# Patient Record
Sex: MALE | Race: WHITE | Employment: FULL TIME | URBAN - METROPOLITAN AREA
[De-identification: names, ages, dates, MRNs, and addresses within clinical notes are randomized per-mention and may not be internally consistent; named-entity substitution may affect disease eponyms.]

---

## 2017-10-16 ENCOUNTER — HOSPITAL ENCOUNTER (OUTPATIENT)
Dept: CARDIOLOGY | Age: 52
Discharge: HOME | End: 2017-10-16
Attending: PHYSICIAN ASSISTANT | Admitting: PHYSICIAN ASSISTANT

## 2017-10-16 DIAGNOSIS — R94.31 ABNORMAL ELECTROCARDIOGRAM: ICD-10-CM

## 2017-10-16 DIAGNOSIS — R06.02 SHORTNESS OF BREATH: ICD-10-CM

## 2017-10-16 DIAGNOSIS — R07.9 CHEST PAIN, UNSPECIFIED TYPE: ICD-10-CM

## 2017-10-16 LAB
LV EF: 67 %
LVEF MODALITY: NORMAL

## 2017-10-16 RX ADMIN — Medication 30 MILLICURIE: at 11:02

## 2017-10-16 RX ADMIN — Medication 10 MILLICURIE: at 11:02

## 2017-10-23 ENCOUNTER — TELEPHONE (OUTPATIENT)
Dept: CARDIOLOGY CLINIC | Age: 52
End: 2017-10-23

## 2017-11-13 ENCOUNTER — TELEPHONE (OUTPATIENT)
Dept: CARDIOLOGY CLINIC | Age: 52
End: 2017-11-13

## 2019-08-31 DIAGNOSIS — F41.9 ANXIETY: ICD-10-CM

## 2019-09-12 ENCOUNTER — OFFICE VISIT (OUTPATIENT)
Dept: FAMILY MEDICINE CLINIC | Age: 54
End: 2019-09-12
Payer: COMMERCIAL

## 2019-09-12 VITALS
HEIGHT: 70 IN | OXYGEN SATURATION: 99 % | BODY MASS INDEX: 30.29 KG/M2 | SYSTOLIC BLOOD PRESSURE: 126 MMHG | WEIGHT: 211.6 LBS | HEART RATE: 88 BPM | DIASTOLIC BLOOD PRESSURE: 68 MMHG

## 2019-09-12 DIAGNOSIS — Z23 NEED FOR PROPHYLACTIC VACCINATION AND INOCULATION AGAINST VARICELLA: ICD-10-CM

## 2019-09-12 DIAGNOSIS — I44.1 WENCKEBACH SECOND DEGREE AV BLOCK: ICD-10-CM

## 2019-09-12 DIAGNOSIS — Z13.220 SCREENING FOR HYPERLIPIDEMIA: ICD-10-CM

## 2019-09-12 DIAGNOSIS — Z00.00 WELL ADULT EXAM: ICD-10-CM

## 2019-09-12 DIAGNOSIS — Z23 NEED FOR PROPHYLACTIC VACCINATION AGAINST DIPHTHERIA-TETANUS-PERTUSSIS (DTP): ICD-10-CM

## 2019-09-12 DIAGNOSIS — Z12.11 ENCOUNTER FOR SCREENING COLONOSCOPY: ICD-10-CM

## 2019-09-12 DIAGNOSIS — Z00.00 WELL ADULT EXAM: Primary | ICD-10-CM

## 2019-09-12 LAB
A/G RATIO: 1.7 (ref 1.1–2.2)
ALBUMIN SERPL-MCNC: 4.5 G/DL (ref 3.4–5)
ALP BLD-CCNC: 132 U/L (ref 40–129)
ALT SERPL-CCNC: 31 U/L (ref 10–40)
ANION GAP SERPL CALCULATED.3IONS-SCNC: 16 MMOL/L (ref 3–16)
AST SERPL-CCNC: 26 U/L (ref 15–37)
BASOPHILS ABSOLUTE: 0.1 K/UL (ref 0–0.2)
BASOPHILS RELATIVE PERCENT: 0.8 %
BILIRUB SERPL-MCNC: 0.5 MG/DL (ref 0–1)
BUN BLDV-MCNC: 18 MG/DL (ref 7–20)
CALCIUM SERPL-MCNC: 9.7 MG/DL (ref 8.3–10.6)
CHLORIDE BLD-SCNC: 100 MMOL/L (ref 99–110)
CHOLESTEROL, FASTING: 206 MG/DL (ref 0–199)
CO2: 22 MMOL/L (ref 21–32)
CREAT SERPL-MCNC: 0.9 MG/DL (ref 0.9–1.3)
EOSINOPHILS ABSOLUTE: 0.2 K/UL (ref 0–0.6)
EOSINOPHILS RELATIVE PERCENT: 2.2 %
GFR AFRICAN AMERICAN: >60
GFR NON-AFRICAN AMERICAN: >60
GLOBULIN: 2.7 G/DL
GLUCOSE BLD-MCNC: 89 MG/DL (ref 70–99)
HCT VFR BLD CALC: 45.1 % (ref 40.5–52.5)
HDLC SERPL-MCNC: 66 MG/DL (ref 40–60)
HEMOGLOBIN: 15.1 G/DL (ref 13.5–17.5)
LDL CHOLESTEROL CALCULATED: 82 MG/DL
LYMPHOCYTES ABSOLUTE: 2 K/UL (ref 1–5.1)
LYMPHOCYTES RELATIVE PERCENT: 27.1 %
MCH RBC QN AUTO: 30.4 PG (ref 26–34)
MCHC RBC AUTO-ENTMCNC: 33.4 G/DL (ref 31–36)
MCV RBC AUTO: 91 FL (ref 80–100)
MONOCYTES ABSOLUTE: 0.8 K/UL (ref 0–1.3)
MONOCYTES RELATIVE PERCENT: 10.4 %
NEUTROPHILS ABSOLUTE: 4.3 K/UL (ref 1.7–7.7)
NEUTROPHILS RELATIVE PERCENT: 59.5 %
PDW BLD-RTO: 14.6 % (ref 12.4–15.4)
PLATELET # BLD: 303 K/UL (ref 135–450)
PMV BLD AUTO: 8 FL (ref 5–10.5)
POTASSIUM SERPL-SCNC: 4.4 MMOL/L (ref 3.5–5.1)
RBC # BLD: 4.95 M/UL (ref 4.2–5.9)
SODIUM BLD-SCNC: 138 MMOL/L (ref 136–145)
TOTAL PROTEIN: 7.2 G/DL (ref 6.4–8.2)
TRIGLYCERIDE, FASTING: 291 MG/DL (ref 0–150)
TSH SERPL DL<=0.05 MIU/L-ACNC: 1.8 UIU/ML (ref 0.27–4.2)
VLDLC SERPL CALC-MCNC: 58 MG/DL
WBC # BLD: 7.3 K/UL (ref 4–11)

## 2019-09-12 PROCEDURE — 90715 TDAP VACCINE 7 YRS/> IM: CPT | Performed by: PHYSICIAN ASSISTANT

## 2019-09-12 PROCEDURE — 90471 IMMUNIZATION ADMIN: CPT | Performed by: PHYSICIAN ASSISTANT

## 2019-09-12 PROCEDURE — 99396 PREV VISIT EST AGE 40-64: CPT | Performed by: PHYSICIAN ASSISTANT

## 2019-09-12 ASSESSMENT — PATIENT HEALTH QUESTIONNAIRE - PHQ9
1. LITTLE INTEREST OR PLEASURE IN DOING THINGS: 0
2. FEELING DOWN, DEPRESSED OR HOPELESS: 0
SUM OF ALL RESPONSES TO PHQ QUESTIONS 1-9: 0
SUM OF ALL RESPONSES TO PHQ QUESTIONS 1-9: 0
SUM OF ALL RESPONSES TO PHQ9 QUESTIONS 1 & 2: 0

## 2019-09-12 ASSESSMENT — ENCOUNTER SYMPTOMS
COUGH: 0
SHORTNESS OF BREATH: 1
CHEST TIGHTNESS: 0

## 2019-09-13 ENCOUNTER — TELEPHONE (OUTPATIENT)
Dept: FAMILY MEDICINE CLINIC | Age: 54
End: 2019-09-13

## 2019-09-16 ENCOUNTER — TELEPHONE (OUTPATIENT)
Dept: FAMILY MEDICINE CLINIC | Age: 54
End: 2019-09-16

## 2019-09-17 ENCOUNTER — TELEPHONE (OUTPATIENT)
Dept: CARDIOLOGY CLINIC | Age: 54
End: 2019-09-17

## 2019-09-19 ENCOUNTER — TELEPHONE (OUTPATIENT)
Dept: CARDIOLOGY CLINIC | Age: 54
End: 2019-09-19

## 2019-09-23 ENCOUNTER — TELEPHONE (OUTPATIENT)
Dept: CARDIOLOGY CLINIC | Age: 54
End: 2019-09-23

## 2019-09-25 ENCOUNTER — TELEPHONE (OUTPATIENT)
Dept: CARDIOLOGY CLINIC | Age: 54
End: 2019-09-25

## 2019-10-01 ENCOUNTER — TELEPHONE (OUTPATIENT)
Dept: CARDIOLOGY CLINIC | Age: 54
End: 2019-10-01

## 2019-10-08 ENCOUNTER — TELEPHONE (OUTPATIENT)
Dept: CARDIOLOGY CLINIC | Age: 54
End: 2019-10-08

## 2020-01-07 ENCOUNTER — OFFICE VISIT (OUTPATIENT)
Dept: FAMILY MEDICINE CLINIC | Age: 55
End: 2020-01-07
Payer: COMMERCIAL

## 2020-01-07 VITALS
BODY MASS INDEX: 31.07 KG/M2 | WEIGHT: 217 LBS | SYSTOLIC BLOOD PRESSURE: 136 MMHG | HEART RATE: 50 BPM | DIASTOLIC BLOOD PRESSURE: 64 MMHG | OXYGEN SATURATION: 96 % | TEMPERATURE: 97.7 F | HEIGHT: 70 IN

## 2020-01-07 PROCEDURE — 99213 OFFICE O/P EST LOW 20 MIN: CPT | Performed by: NURSE PRACTITIONER

## 2020-01-07 RX ORDER — METHYLPREDNISOLONE 4 MG/1
TABLET ORAL
Qty: 1 KIT | Refills: 0 | Status: SHIPPED | OUTPATIENT
Start: 2020-01-07 | End: 2020-01-13

## 2020-01-07 RX ORDER — BENZONATATE 100 MG/1
100 CAPSULE ORAL 3 TIMES DAILY PRN
Qty: 20 CAPSULE | Refills: 0 | Status: SHIPPED | OUTPATIENT
Start: 2020-01-07 | End: 2020-04-21

## 2020-01-07 RX ORDER — AZITHROMYCIN 250 MG/1
TABLET, FILM COATED ORAL
Qty: 1 PACKET | Refills: 0 | Status: ON HOLD | OUTPATIENT
Start: 2020-01-07 | End: 2020-03-08 | Stop reason: ALTCHOICE

## 2020-01-07 ASSESSMENT — ENCOUNTER SYMPTOMS
DIARRHEA: 0
CHEST TIGHTNESS: 1
COUGH: 1
SINUS PRESSURE: 0
HEARTBURN: 0
WHEEZING: 1
VOMITING: 0
RHINORRHEA: 0
SINUS PAIN: 0
SHORTNESS OF BREATH: 1
HEMOPTYSIS: 0
SORE THROAT: 0
NAUSEA: 0

## 2020-01-07 NOTE — PROGRESS NOTES
Vineet Apple   62 y.o.  male  [id number]      Chief Complaint   Patient presents with    Cough     c/o sore throat chest congestion onset before Christmas        Subjective:  54 y.o.male is here for a follow up. He has the following chronic/acute medical problems:  Patient Active Problem List   Diagnosis    Anxiety       Cough   This is a new problem. The current episode started 1 to 4 weeks ago (1 week ago - other symtoms longer). The problem has been gradually worsening. Episode frequency: intermittantly. The cough is non-productive. Associated symptoms include nasal congestion (slight), shortness of breath and wheezing. Pertinent negatives include no chest pain, chills, ear congestion, ear pain, fever, headaches, heartburn, hemoptysis, myalgias, postnasal drip, rash, rhinorrhea, sore throat, sweats or weight loss. Nothing aggravates the symptoms. He has tried OTC cough suppressant (medrol pack prior to christmas) for the symptoms. The treatment provided mild relief. Review of Systems   Constitutional: Positive for fatigue. Negative for appetite change, chills, fever and weight loss. HENT: Positive for congestion. Negative for ear pain, postnasal drip, rhinorrhea, sinus pressure, sinus pain, sneezing and sore throat. Respiratory: Positive for cough, chest tightness, shortness of breath and wheezing. Negative for hemoptysis. Cardiovascular: Negative for chest pain and palpitations. Gastrointestinal: Negative for diarrhea, heartburn, nausea and vomiting. Musculoskeletal: Negative for myalgias. Skin: Negative for rash. Neurological: Negative for dizziness, light-headedness and headaches. Current Outpatient Medications   Medication Sig Dispense Refill    azithromycin (ZITHROMAX) 250 MG tablet Take 2 tabs (500 mg) on Day 1, and take 1 tab (250 mg) on days 2 through 5. 1 packet 0    methylPREDNISolone (MEDROL, CARI,) 4 MG tablet Take by mouth.  1 kit 0    benzonatate (TESSALON PERLES) 100 MG capsule Take 1 capsule by mouth 3 times daily as needed for Cough 20 capsule 0     No current facility-administered medications for this visit. Past medical, family,surgical history reviewed today. Objective:  /64   Pulse 50   Temp 97.7 °F (36.5 °C)   Ht 5' 10\" (1.778 m)   Wt 217 lb (98.4 kg)   SpO2 96%   BMI 31.14 kg/m²   BP Readings from Last 3 Encounters:   01/07/20 136/64   09/12/19 126/68     Wt Readings from Last 3 Encounters:   01/07/20 217 lb (98.4 kg)   09/12/19 211 lb 9.6 oz (96 kg)         Physical Exam  Constitutional:       Appearance: Normal appearance. HENT:      Head: Normocephalic. Right Ear: Tympanic membrane, ear canal and external ear normal.      Left Ear: Tympanic membrane, ear canal and external ear normal.      Nose: Congestion present. Mouth/Throat:      Lips: Pink. Mouth: Mucous membranes are moist.      Pharynx: Oropharynx is clear. Neck:      Musculoskeletal: Neck supple. Cardiovascular:      Rate and Rhythm: Normal rate and regular rhythm. Heart sounds: Normal heart sounds. Pulmonary:      Effort: Pulmonary effort is normal.      Breath sounds: Normal breath sounds. Skin:     General: Skin is warm and dry. Neurological:      Mental Status: He is alert and oriented to person, place, and time.    Psychiatric:         Mood and Affect: Mood normal.         Behavior: Behavior normal.         Lab Results   Component Value Date    WBC 7.3 09/12/2019    HGB 15.1 09/12/2019    HCT 45.1 09/12/2019    MCV 91.0 09/12/2019     09/12/2019     Lab Results   Component Value Date     09/12/2019    K 4.4 09/12/2019     09/12/2019    CO2 22 09/12/2019    BUN 18 09/12/2019    CREATININE 0.9 09/12/2019    GLUCOSE 89 09/12/2019    CALCIUM 9.7 09/12/2019    PROT 7.2 09/12/2019    LABALBU 4.5 09/12/2019    BILITOT 0.5 09/12/2019    ALKPHOS 132 (H) 09/12/2019    AST 26 09/12/2019    ALT 31 09/12/2019    LABGLOM >60 09/12/2019    GFRAA >60

## 2020-03-08 ENCOUNTER — APPOINTMENT (OUTPATIENT)
Dept: GENERAL RADIOLOGY | Age: 55
DRG: 853 | End: 2020-03-08
Payer: COMMERCIAL

## 2020-03-08 ENCOUNTER — APPOINTMENT (OUTPATIENT)
Dept: CT IMAGING | Age: 55
DRG: 853 | End: 2020-03-08
Payer: COMMERCIAL

## 2020-03-08 ENCOUNTER — HOSPITAL ENCOUNTER (INPATIENT)
Age: 55
LOS: 16 days | Discharge: HOME HEALTH | DRG: 853 | End: 2020-03-24
Attending: FAMILY MEDICINE | Admitting: HOSPITALIST
Payer: COMMERCIAL

## 2020-03-08 PROBLEM — A41.9 SEPSIS (HCC): Status: ACTIVE | Noted: 2020-03-08

## 2020-03-08 LAB
ACETAMINOPHEN LEVEL: <5 UG/ML (ref 15–30)
ADENOVIRUS DETECTION BY PCR: NOT DETECTED
ALBUMIN SERPL-MCNC: 4.1 GM/DL (ref 3.4–5)
ALCOHOL SCREEN SERUM: NORMAL %WT/VOL
ALP BLD-CCNC: 121 IU/L (ref 40–128)
ALT SERPL-CCNC: 25 U/L (ref 10–40)
AMMONIA: 34 UMOL/L (ref 16–60)
AMMONIA: ABNORMAL UMOL/L (ref 16–60)
AMPHETAMINES: ABNORMAL
ANION GAP SERPL CALCULATED.3IONS-SCNC: 14 MMOL/L (ref 4–16)
ANION GAP SERPL CALCULATED.3IONS-SCNC: 16 MMOL/L (ref 4–16)
ANION GAP SERPL CALCULATED.3IONS-SCNC: 16 MMOL/L (ref 4–16)
ANION GAP SERPL CALCULATED.3IONS-SCNC: 31 MMOL/L (ref 4–16)
AST SERPL-CCNC: 28 IU/L (ref 15–37)
BACTERIA: ABNORMAL /HPF
BANDED NEUTROPHILS ABSOLUTE COUNT: 0.9 K/CU MM
BANDED NEUTROPHILS RELATIVE PERCENT: 7 % (ref 5–11)
BARBITURATE SCREEN URINE: NEGATIVE
BASE EXCESS MIXED: ABNORMAL (ref 0–1.2)
BASE EXCESS: ABNORMAL (ref 0–3.3)
BASOPHILS ABSOLUTE: 0.1 K/CU MM
BASOPHILS RELATIVE PERCENT: 0.6 % (ref 0–1)
BENZODIAZEPINE SCREEN, URINE: NEGATIVE
BETA-HYDROXYBUTYRATE: 2.5 MG/DL (ref 0–3)
BILIRUB SERPL-MCNC: 1.2 MG/DL (ref 0–1)
BILIRUBIN URINE: NEGATIVE MG/DL
BLOOD, URINE: ABNORMAL
BORDETELLA PERTUSSIS PCR: NOT DETECTED
BUN BLDV-MCNC: 15 MG/DL (ref 6–23)
BUN BLDV-MCNC: 19 MG/DL (ref 6–23)
BUN BLDV-MCNC: 23 MG/DL (ref 6–23)
BURR CELLS: ABNORMAL
CALCIUM IONIZED: 4.28 MG/DL (ref 4.48–5.28)
CALCIUM SERPL-MCNC: 8 MG/DL (ref 8.3–10.6)
CALCIUM SERPL-MCNC: 8.3 MG/DL (ref 8.3–10.6)
CALCIUM SERPL-MCNC: 9.3 MG/DL (ref 8.3–10.6)
CANNABINOID SCREEN URINE: NEGATIVE
CARBON MONOXIDE, BLOOD: 1.8 % (ref 0–5)
CARBON MONOXIDE, BLOOD: 2 % (ref 0–5)
CARBON MONOXIDE, BLOOD: 2.1 % (ref 0–5)
CHLAMYDOPHILA PNEUMONIA PCR: NOT DETECTED
CHLORIDE BLD-SCNC: 100 MMOL/L (ref 99–110)
CHLORIDE BLD-SCNC: 100 MMOL/L (ref 99–110)
CHLORIDE BLD-SCNC: 104 MMOL/L (ref 99–110)
CHLORIDE BLD-SCNC: 95 MMOL/L (ref 99–110)
CLARITY: ABNORMAL
CO2 CONTENT: 13.6 MMOL/L (ref 19–24)
CO2 CONTENT: 17.8 MMOL/L (ref 19–24)
CO2 CONTENT: 24.1 MMOL/L (ref 19–24)
CO2: 18 MMOL/L (ref 21–32)
CO2: 19 MMOL/L (ref 21–32)
CO2: 22 MMOL/L (ref 21–32)
CO2: 9 MMOL/L (ref 21–32)
COCAINE METABOLITE: NEGATIVE
COLOR: YELLOW
COMMENT: ABNORMAL
CORONAVIRUS 229E PCR: NOT DETECTED
CORONAVIRUS HKU1 PCR: NOT DETECTED
CORONAVIRUS NL63 PCR: NOT DETECTED
CORONAVIRUS OC43 PCR: NOT DETECTED
CREAT SERPL-MCNC: 1.5 MG/DL (ref 0.9–1.3)
CREAT SERPL-MCNC: 1.7 MG/DL (ref 0.9–1.3)
CREAT SERPL-MCNC: 1.9 MG/DL (ref 0.9–1.3)
DIFFERENTIAL TYPE: ABNORMAL
DIFFERENTIAL TYPE: ABNORMAL
DOSE AMOUNT: ABNORMAL
DOSE AMOUNT: ABNORMAL
DOSE TIME: ABNORMAL
DOSE TIME: ABNORMAL
EKG ATRIAL RATE: 0 BPM
EKG ATRIAL RATE: 115 BPM
EKG ATRIAL RATE: 46 BPM
EKG DIAGNOSIS: NORMAL
EKG P AXIS: 158 DEGREES
EKG P-R INTERVAL: 70 MS
EKG Q-T INTERVAL: 308 MS
EKG Q-T INTERVAL: 348 MS
EKG Q-T INTERVAL: 594 MS
EKG QRS DURATION: 148 MS
EKG QRS DURATION: 158 MS
EKG QRS DURATION: 18 MS
EKG QTC CALCULATION (BAZETT): 418 MS
EKG QTC CALCULATION (BAZETT): 491 MS
EKG QTC CALCULATION (BAZETT): 677 MS
EKG R AXIS: -60 DEGREES
EKG R AXIS: 45 DEGREES
EKG R AXIS: 59 DEGREES
EKG T AXIS: 204 DEGREES
EKG T AXIS: 233 DEGREES
EKG T AXIS: 261 DEGREES
EKG VENTRICULAR RATE: 111 BPM
EKG VENTRICULAR RATE: 120 BPM
EKG VENTRICULAR RATE: 78 BPM
EOSINOPHILS ABSOLUTE: 0.1 K/CU MM
EOSINOPHILS RELATIVE PERCENT: 0.6 % (ref 0–3)
ESTIMATED AVERAGE GLUCOSE: 105 MG/DL
GFR AFRICAN AMERICAN: 45 ML/MIN/1.73M2
GFR AFRICAN AMERICAN: 51 ML/MIN/1.73M2
GFR AFRICAN AMERICAN: 59 ML/MIN/1.73M2
GFR NON-AFRICAN AMERICAN: 37 ML/MIN/1.73M2
GFR NON-AFRICAN AMERICAN: 42 ML/MIN/1.73M2
GFR NON-AFRICAN AMERICAN: 48 ML/MIN/1.73M2
GLUCOSE BLD-MCNC: 111 MG/DL (ref 70–99)
GLUCOSE BLD-MCNC: 118 MG/DL (ref 70–99)
GLUCOSE BLD-MCNC: 127 MG/DL (ref 70–99)
GLUCOSE BLD-MCNC: 256 MG/DL (ref 70–99)
GLUCOSE BLD-MCNC: 343 MG/DL (ref 70–99)
GLUCOSE BLD-MCNC: 90 MG/DL (ref 70–99)
GLUCOSE BLD-MCNC: 92 MG/DL (ref 70–99)
GLUCOSE, URINE: 150 MG/DL
HBA1C MFR BLD: 5.3 % (ref 4.2–6.3)
HCO3 ARTERIAL: 12.8 MMOL/L (ref 18–23)
HCO3 ARTERIAL: 16.9 MMOL/L (ref 18–23)
HCO3 ARTERIAL: 23.1 MMOL/L (ref 18–23)
HCO3 VENOUS: 7.2 MMOL/L (ref 19–25)
HCT VFR BLD CALC: 33.1 % (ref 42–52)
HCT VFR BLD CALC: 42.1 % (ref 42–52)
HEMOGLOBIN: 10.3 GM/DL (ref 13.5–18)
HEMOGLOBIN: 12.1 GM/DL (ref 13.5–18)
HUMAN METAPNEUMOVIRUS PCR: NOT DETECTED
IMMATURE NEUTROPHIL %: 0.9 % (ref 0–0.43)
INFLUENZA A BY PCR: NOT DETECTED
INFLUENZA A H1 (2009) PCR: NOT DETECTED
INFLUENZA A H1 PANDEMIC PCR: NOT DETECTED
INFLUENZA A H3 PCR: NOT DETECTED
INFLUENZA B BY PCR: NOT DETECTED
INR BLD: 1.26 INDEX
IONIZED CA: 1.07 MMOL/L (ref 1.12–1.32)
KETONES, URINE: ABNORMAL MG/DL
LACTATE: 3 MMOL/L (ref 0.4–2)
LACTATE: ABNORMAL MMOL/L (ref 0.4–2)
LACTIC ACID, SEPSIS: 1.3 MMOL/L (ref 0.5–1.9)
LEUKOCYTE ESTERASE, URINE: NEGATIVE
LIPASE: 22 IU/L (ref 13–60)
LYMPHOCYTES ABSOLUTE: 1.2 K/CU MM
LYMPHOCYTES ABSOLUTE: 4.1 K/CU MM
LYMPHOCYTES RELATIVE PERCENT: 21 % (ref 24–44)
LYMPHOCYTES RELATIVE PERCENT: 9 % (ref 24–44)
MAGNESIUM: 2.6 MG/DL (ref 1.8–2.4)
MAGNESIUM: 3.3 MG/DL (ref 1.8–2.4)
MCH RBC QN AUTO: 29.7 PG (ref 27–31)
MCH RBC QN AUTO: 29.9 PG (ref 27–31)
MCHC RBC AUTO-ENTMCNC: 28.7 % (ref 32–36)
MCHC RBC AUTO-ENTMCNC: 31.1 % (ref 32–36)
MCV RBC AUTO: 103.4 FL (ref 78–100)
MCV RBC AUTO: 95.9 FL (ref 78–100)
METHEMOGLOBIN ARTERIAL: 1.1 %
METHEMOGLOBIN ARTERIAL: 1.1 %
METHEMOGLOBIN ARTERIAL: 1.3 %
MONOCYTES ABSOLUTE: 0.9 K/CU MM
MONOCYTES ABSOLUTE: 2 K/CU MM
MONOCYTES RELATIVE PERCENT: 10.1 % (ref 0–4)
MONOCYTES RELATIVE PERCENT: 7 % (ref 0–4)
MUCUS: ABNORMAL HPF
MYCOPLASMA PNEUMONIAE PCR: NOT DETECTED
NITRITE URINE, QUANTITATIVE: NEGATIVE
NUCLEATED RBC %: 0 %
O2 SAT, VEN: 79.9 % (ref 50–70)
O2 SATURATION: 89.1 % (ref 96–97)
O2 SATURATION: 92.3 % (ref 96–97)
O2 SATURATION: 97 % (ref 96–97)
OPIATES, URINE: NEGATIVE
OSMOLALITY: 287 MOS/L (ref 280–300)
OSMOLALITY: 290 MOS/L (ref 280–300)
OXYCODONE: ABNORMAL
PARAINFLUENZA 1 PCR: NOT DETECTED
PARAINFLUENZA 2 PCR: NOT DETECTED
PARAINFLUENZA 3 PCR: NOT DETECTED
PARAINFLUENZA 4 PCR: NOT DETECTED
PCO2 ARTERIAL: 26 MMHG (ref 32–45)
PCO2 ARTERIAL: 30 MMHG (ref 32–45)
PCO2 ARTERIAL: 31 MMHG (ref 32–45)
PCO2, VEN: 42 MMHG (ref 38–52)
PDW BLD-RTO: 14.6 % (ref 11.7–14.9)
PDW BLD-RTO: 14.7 % (ref 11.7–14.9)
PH BLOOD: 7.3 (ref 7.34–7.45)
PH BLOOD: 7.36 (ref 7.34–7.45)
PH BLOOD: 7.48 (ref 7.34–7.45)
PH VENOUS: 6.84 (ref 7.32–7.42)
PH, URINE: 6 (ref 5–8)
PHENCYCLIDINE, URINE: NEGATIVE
PHOSPHORUS: 5 MG/DL (ref 2.5–4.9)
PLATELET # BLD: 221 K/CU MM (ref 140–440)
PLATELET # BLD: 270 K/CU MM (ref 140–440)
PMV BLD AUTO: 11 FL (ref 7.5–11.1)
PMV BLD AUTO: 11.1 FL (ref 7.5–11.1)
PO2 ARTERIAL: 355 MMHG (ref 75–100)
PO2 ARTERIAL: 57 MMHG (ref 75–100)
PO2 ARTERIAL: 61 MMHG (ref 75–100)
PO2, VEN: 64 MMHG (ref 28–48)
POLYCHROMASIA: ABNORMAL
POTASSIUM SERPL-SCNC: 3.6 MMOL/L (ref 3.5–5.1)
POTASSIUM SERPL-SCNC: 3.9 MMOL/L (ref 3.5–5.1)
POTASSIUM SERPL-SCNC: 4.1 MMOL/L (ref 3.5–5.1)
POTASSIUM SERPL-SCNC: 4.9 MMOL/L (ref 3.5–5.1)
PROTEIN UA: 100 MG/DL
PROTHROMBIN TIME: 15.3 SECONDS (ref 11.7–14.5)
RBC # BLD: 3.45 M/CU MM (ref 4.6–6.2)
RBC # BLD: 4.07 M/CU MM (ref 4.6–6.2)
RBC URINE: 24 /HPF (ref 0–3)
RHINOVIRUS ENTEROVIRUS PCR: NOT DETECTED
RSV PCR: NOT DETECTED
SALICYLATE LEVEL: <0.3 MG/DL (ref 15–30)
SEGMENTED NEUTROPHILS ABSOLUTE COUNT: 13 K/CU MM
SEGMENTED NEUTROPHILS ABSOLUTE COUNT: 9.8 K/CU MM
SEGMENTED NEUTROPHILS RELATIVE PERCENT: 66.8 % (ref 36–66)
SEGMENTED NEUTROPHILS RELATIVE PERCENT: 77 % (ref 36–66)
SODIUM BLD-SCNC: 134 MMOL/L (ref 135–145)
SODIUM BLD-SCNC: 135 MMOL/L (ref 135–145)
SODIUM BLD-SCNC: 137 MMOL/L (ref 135–145)
SODIUM BLD-SCNC: 138 MMOL/L (ref 135–145)
SPECIFIC GRAVITY UA: 1.03 (ref 1–1.03)
SPECIFIC GRAVITY UA: ABNORMAL (ref 1–1.03)
SQUAMOUS EPITHELIAL: <1 /HPF
TOTAL CK: 909 IU/L (ref 38–174)
TOTAL IMMATURE NEUTOROPHIL: 0.17 K/CU MM
TOTAL NUCLEATED RBC: 0 K/CU MM
TOTAL PROTEIN: 6.4 GM/DL (ref 6.4–8.2)
TOTAL RETICULOCYTE COUNT: 0.09 K/CU MM
TRICHOMONAS: ABNORMAL /HPF
TROPONIN T: 0.02 NG/ML
TROPONIN T: 0.05 NG/ML
TROPONIN T: 0.13 NG/ML
TSH HIGH SENSITIVITY: 1.79 UIU/ML (ref 0.27–4.2)
UROBILINOGEN, URINE: NORMAL MG/DL (ref 0.2–1)
WBC # BLD: 12.8 K/CU MM (ref 4–10.5)
WBC # BLD: 19.4 K/CU MM (ref 4–10.5)
WBC UA: 10 /HPF (ref 0–2)

## 2020-03-08 PROCEDURE — 2500000003 HC RX 250 WO HCPCS: Performed by: FAMILY MEDICINE

## 2020-03-08 PROCEDURE — 84484 ASSAY OF TROPONIN QUANT: CPT

## 2020-03-08 PROCEDURE — 96365 THER/PROPH/DIAG IV INF INIT: CPT

## 2020-03-08 PROCEDURE — 80051 ELECTROLYTE PANEL: CPT

## 2020-03-08 PROCEDURE — 2580000003 HC RX 258: Performed by: FAMILY MEDICINE

## 2020-03-08 PROCEDURE — 93005 ELECTROCARDIOGRAM TRACING: CPT | Performed by: PHYSICIAN ASSISTANT

## 2020-03-08 PROCEDURE — 89220 SPUTUM SPECIMEN COLLECTION: CPT

## 2020-03-08 PROCEDURE — 82010 KETONE BODYS QUAN: CPT

## 2020-03-08 PROCEDURE — 82805 BLOOD GASES W/O2 SATURATION: CPT

## 2020-03-08 PROCEDURE — 93010 ELECTROCARDIOGRAM REPORT: CPT | Performed by: INTERNAL MEDICINE

## 2020-03-08 PROCEDURE — 93005 ELECTROCARDIOGRAM TRACING: CPT | Performed by: HOSPITALIST

## 2020-03-08 PROCEDURE — 87077 CULTURE AEROBIC IDENTIFY: CPT

## 2020-03-08 PROCEDURE — G0480 DRUG TEST DEF 1-7 CLASSES: HCPCS

## 2020-03-08 PROCEDURE — 85610 PROTHROMBIN TIME: CPT

## 2020-03-08 PROCEDURE — 6360000002 HC RX W HCPCS: Performed by: INTERNAL MEDICINE

## 2020-03-08 PROCEDURE — 85025 COMPLETE CBC W/AUTO DIFF WBC: CPT

## 2020-03-08 PROCEDURE — 6370000000 HC RX 637 (ALT 250 FOR IP): Performed by: INTERNAL MEDICINE

## 2020-03-08 PROCEDURE — 2000000000 HC ICU R&B

## 2020-03-08 PROCEDURE — 93005 ELECTROCARDIOGRAM TRACING: CPT | Performed by: FAMILY MEDICINE

## 2020-03-08 PROCEDURE — 80048 BASIC METABOLIC PNL TOTAL CA: CPT

## 2020-03-08 PROCEDURE — 85007 BL SMEAR W/DIFF WBC COUNT: CPT

## 2020-03-08 PROCEDURE — 36600 WITHDRAWAL OF ARTERIAL BLOOD: CPT

## 2020-03-08 PROCEDURE — 94640 AIRWAY INHALATION TREATMENT: CPT

## 2020-03-08 PROCEDURE — 82140 ASSAY OF AMMONIA: CPT

## 2020-03-08 PROCEDURE — 83735 ASSAY OF MAGNESIUM: CPT

## 2020-03-08 PROCEDURE — 81001 URINALYSIS AUTO W/SCOPE: CPT

## 2020-03-08 PROCEDURE — 2580000003 HC RX 258: Performed by: HOSPITALIST

## 2020-03-08 PROCEDURE — 4500000029 HC MAJOR PROCEDURE

## 2020-03-08 PROCEDURE — 6360000002 HC RX W HCPCS: Performed by: HOSPITALIST

## 2020-03-08 PROCEDURE — 87798 DETECT AGENT NOS DNA AMP: CPT

## 2020-03-08 PROCEDURE — 82962 GLUCOSE BLOOD TEST: CPT

## 2020-03-08 PROCEDURE — 2500000003 HC RX 250 WO HCPCS: Performed by: INTERNAL MEDICINE

## 2020-03-08 PROCEDURE — 87040 BLOOD CULTURE FOR BACTERIA: CPT

## 2020-03-08 PROCEDURE — 84100 ASSAY OF PHOSPHORUS: CPT

## 2020-03-08 PROCEDURE — 96375 TX/PRO/DX INJ NEW DRUG ADDON: CPT

## 2020-03-08 PROCEDURE — C9113 INJ PANTOPRAZOLE SODIUM, VIA: HCPCS | Performed by: HOSPITALIST

## 2020-03-08 PROCEDURE — 84443 ASSAY THYROID STIM HORMONE: CPT

## 2020-03-08 PROCEDURE — 0BH17EZ INSERTION OF ENDOTRACHEAL AIRWAY INTO TRACHEA, VIA NATURAL OR ARTIFICIAL OPENING: ICD-10-PCS | Performed by: FAMILY MEDICINE

## 2020-03-08 PROCEDURE — 6360000004 HC RX CONTRAST MEDICATION: Performed by: FAMILY MEDICINE

## 2020-03-08 PROCEDURE — 80053 COMPREHEN METABOLIC PANEL: CPT

## 2020-03-08 PROCEDURE — 71045 X-RAY EXAM CHEST 1 VIEW: CPT

## 2020-03-08 PROCEDURE — 87581 M.PNEUMON DNA AMP PROBE: CPT

## 2020-03-08 PROCEDURE — 94002 VENT MGMT INPAT INIT DAY: CPT

## 2020-03-08 PROCEDURE — 85027 COMPLETE CBC AUTOMATED: CPT

## 2020-03-08 PROCEDURE — 82330 ASSAY OF CALCIUM: CPT

## 2020-03-08 PROCEDURE — 06HM33Z INSERTION OF INFUSION DEVICE INTO RIGHT FEMORAL VEIN, PERCUTANEOUS APPROACH: ICD-10-PCS | Performed by: FAMILY MEDICINE

## 2020-03-08 PROCEDURE — 5A1955Z RESPIRATORY VENTILATION, GREATER THAN 96 CONSECUTIVE HOURS: ICD-10-PCS | Performed by: FAMILY MEDICINE

## 2020-03-08 PROCEDURE — 82550 ASSAY OF CK (CPK): CPT

## 2020-03-08 PROCEDURE — 83605 ASSAY OF LACTIC ACID: CPT

## 2020-03-08 PROCEDURE — 83930 ASSAY OF BLOOD OSMOLALITY: CPT

## 2020-03-08 PROCEDURE — 83690 ASSAY OF LIPASE: CPT

## 2020-03-08 PROCEDURE — 6370000000 HC RX 637 (ALT 250 FOR IP): Performed by: HOSPITALIST

## 2020-03-08 PROCEDURE — 83036 HEMOGLOBIN GLYCOSYLATED A1C: CPT

## 2020-03-08 PROCEDURE — 2580000003 HC RX 258: Performed by: INTERNAL MEDICINE

## 2020-03-08 PROCEDURE — 6360000002 HC RX W HCPCS: Performed by: FAMILY MEDICINE

## 2020-03-08 PROCEDURE — 94761 N-INVAS EAR/PLS OXIMETRY MLT: CPT

## 2020-03-08 PROCEDURE — 80307 DRUG TEST PRSMV CHEM ANLYZR: CPT

## 2020-03-08 PROCEDURE — 99291 CRITICAL CARE FIRST HOUR: CPT

## 2020-03-08 PROCEDURE — 87633 RESP VIRUS 12-25 TARGETS: CPT

## 2020-03-08 PROCEDURE — 87486 CHLMYD PNEUM DNA AMP PROBE: CPT

## 2020-03-08 PROCEDURE — 6370000000 HC RX 637 (ALT 250 FOR IP): Performed by: FAMILY MEDICINE

## 2020-03-08 PROCEDURE — 82803 BLOOD GASES ANY COMBINATION: CPT

## 2020-03-08 PROCEDURE — 71275 CT ANGIOGRAPHY CHEST: CPT

## 2020-03-08 PROCEDURE — 2700000000 HC OXYGEN THERAPY PER DAY

## 2020-03-08 PROCEDURE — 70450 CT HEAD/BRAIN W/O DYE: CPT

## 2020-03-08 PROCEDURE — 2500000003 HC RX 250 WO HCPCS: Performed by: HOSPITALIST

## 2020-03-08 RX ORDER — SODIUM CHLORIDE 9 MG/ML
INJECTION, SOLUTION INTRAVENOUS CONTINUOUS
Status: DISCONTINUED | OUTPATIENT
Start: 2020-03-08 | End: 2020-03-08

## 2020-03-08 RX ORDER — 0.9 % SODIUM CHLORIDE 0.9 %
30 INTRAVENOUS SOLUTION INTRAVENOUS ONCE
Status: COMPLETED | OUTPATIENT
Start: 2020-03-08 | End: 2020-03-08

## 2020-03-08 RX ORDER — PROPOFOL 10 MG/ML
10 INJECTION, EMULSION INTRAVENOUS CONTINUOUS
Status: DISCONTINUED | OUTPATIENT
Start: 2020-03-08 | End: 2020-03-08

## 2020-03-08 RX ORDER — ASPIRIN 81 MG/1
81 TABLET, CHEWABLE ORAL DAILY
Status: DISCONTINUED | OUTPATIENT
Start: 2020-03-08 | End: 2020-03-24 | Stop reason: HOSPADM

## 2020-03-08 RX ORDER — SODIUM CHLORIDE 0.9 % (FLUSH) 0.9 %
10 SYRINGE (ML) INJECTION 2 TIMES DAILY
Status: DISCONTINUED | OUTPATIENT
Start: 2020-03-08 | End: 2020-03-24 | Stop reason: HOSPADM

## 2020-03-08 RX ORDER — SODIUM CHLORIDE 0.9 % (FLUSH) 0.9 %
10 SYRINGE (ML) INJECTION EVERY 12 HOURS SCHEDULED
Status: DISCONTINUED | OUTPATIENT
Start: 2020-03-08 | End: 2020-03-24 | Stop reason: HOSPADM

## 2020-03-08 RX ORDER — IPRATROPIUM BROMIDE AND ALBUTEROL SULFATE 2.5; .5 MG/3ML; MG/3ML
1 SOLUTION RESPIRATORY (INHALATION)
Status: DISCONTINUED | OUTPATIENT
Start: 2020-03-08 | End: 2020-03-08

## 2020-03-08 RX ORDER — ACETAMINOPHEN 650 MG/1
650 SUPPOSITORY RECTAL EVERY 6 HOURS PRN
Status: DISCONTINUED | OUTPATIENT
Start: 2020-03-08 | End: 2020-03-24 | Stop reason: HOSPADM

## 2020-03-08 RX ORDER — DOPAMINE HYDROCHLORIDE 160 MG/100ML
INJECTION, SOLUTION INTRAVENOUS
Status: COMPLETED
Start: 2020-03-08 | End: 2020-03-08

## 2020-03-08 RX ORDER — ALBUTEROL SULFATE 90 UG/1
4 AEROSOL, METERED RESPIRATORY (INHALATION)
Status: DISCONTINUED | OUTPATIENT
Start: 2020-03-08 | End: 2020-03-17 | Stop reason: SDUPTHER

## 2020-03-08 RX ORDER — DEXTROSE MONOHYDRATE 25 G/50ML
12.5 INJECTION, SOLUTION INTRAVENOUS PRN
Status: DISCONTINUED | OUTPATIENT
Start: 2020-03-08 | End: 2020-03-24 | Stop reason: HOSPADM

## 2020-03-08 RX ORDER — PROPOFOL 10 MG/ML
10 INJECTION, EMULSION INTRAVENOUS
Status: DISCONTINUED | OUTPATIENT
Start: 2020-03-08 | End: 2020-03-08 | Stop reason: SDUPTHER

## 2020-03-08 RX ORDER — DOPAMINE HYDROCHLORIDE 160 MG/100ML
5 INJECTION, SOLUTION INTRAVENOUS CONTINUOUS
Status: DISCONTINUED | OUTPATIENT
Start: 2020-03-08 | End: 2020-03-11 | Stop reason: ALTCHOICE

## 2020-03-08 RX ORDER — LACTULOSE 10 G/15ML
20 SOLUTION ORAL EVERY 6 HOURS SCHEDULED
Status: DISCONTINUED | OUTPATIENT
Start: 2020-03-08 | End: 2020-03-08

## 2020-03-08 RX ORDER — LACTULOSE 10 G/15ML
30 SOLUTION ORAL ONCE
Status: COMPLETED | OUTPATIENT
Start: 2020-03-08 | End: 2020-03-08

## 2020-03-08 RX ORDER — DEXTROSE MONOHYDRATE 50 MG/ML
100 INJECTION, SOLUTION INTRAVENOUS PRN
Status: DISCONTINUED | OUTPATIENT
Start: 2020-03-08 | End: 2020-03-24 | Stop reason: HOSPADM

## 2020-03-08 RX ORDER — CHLORHEXIDINE GLUCONATE 0.12 MG/ML
15 RINSE ORAL 2 TIMES DAILY
Status: DISCONTINUED | OUTPATIENT
Start: 2020-03-08 | End: 2020-03-19

## 2020-03-08 RX ORDER — ACETAMINOPHEN 325 MG/1
650 TABLET ORAL EVERY 6 HOURS PRN
Status: DISCONTINUED | OUTPATIENT
Start: 2020-03-08 | End: 2020-03-09 | Stop reason: SDUPTHER

## 2020-03-08 RX ORDER — HYDROMORPHONE HCL 110MG/55ML
1 PATIENT CONTROLLED ANALGESIA SYRINGE INTRAVENOUS ONCE
Status: COMPLETED | OUTPATIENT
Start: 2020-03-08 | End: 2020-03-08

## 2020-03-08 RX ORDER — SODIUM CHLORIDE 0.9 % (FLUSH) 0.9 %
10 SYRINGE (ML) INJECTION PRN
Status: DISCONTINUED | OUTPATIENT
Start: 2020-03-08 | End: 2020-03-24 | Stop reason: HOSPADM

## 2020-03-08 RX ORDER — PANTOPRAZOLE SODIUM 40 MG/10ML
40 INJECTION, POWDER, LYOPHILIZED, FOR SOLUTION INTRAVENOUS DAILY
Status: DISCONTINUED | OUTPATIENT
Start: 2020-03-08 | End: 2020-03-24 | Stop reason: HOSPADM

## 2020-03-08 RX ORDER — NICOTINE POLACRILEX 4 MG
15 LOZENGE BUCCAL PRN
Status: DISCONTINUED | OUTPATIENT
Start: 2020-03-08 | End: 2020-03-24 | Stop reason: HOSPADM

## 2020-03-08 RX ADMIN — VANCOMYCIN HYDROCHLORIDE 2000 MG: 1 INJECTION, POWDER, LYOPHILIZED, FOR SOLUTION INTRAVENOUS at 05:30

## 2020-03-08 RX ADMIN — PIPERACILLIN AND TAZOBACTAM 3.38 G: 3; .375 INJECTION, POWDER, LYOPHILIZED, FOR SOLUTION INTRAVENOUS at 20:32

## 2020-03-08 RX ADMIN — Medication 50 MEQ: at 04:15

## 2020-03-08 RX ADMIN — HYDROMORPHONE HYDROCHLORIDE 1 MG: 2 INJECTION, SOLUTION INTRAMUSCULAR; INTRAVENOUS; SUBCUTANEOUS at 04:30

## 2020-03-08 RX ADMIN — ENOXAPARIN SODIUM 40 MG: 40 INJECTION SUBCUTANEOUS at 10:49

## 2020-03-08 RX ADMIN — IOPAMIDOL 80 ML: 755 INJECTION, SOLUTION INTRAVENOUS at 04:22

## 2020-03-08 RX ADMIN — SODIUM BICARBONATE: 84 INJECTION, SOLUTION INTRAVENOUS at 10:30

## 2020-03-08 RX ADMIN — MIDAZOLAM HYDROCHLORIDE 8 MG/HR: 5 INJECTION, SOLUTION INTRAMUSCULAR; INTRAVENOUS at 19:32

## 2020-03-08 RX ADMIN — CEFTRIAXONE 2 G: 1 INJECTION, POWDER, FOR SOLUTION INTRAMUSCULAR; INTRAVENOUS at 04:45

## 2020-03-08 RX ADMIN — ACETAMINOPHEN 650 MG: 650 SUPPOSITORY RECTAL at 23:07

## 2020-03-08 RX ADMIN — PANTOPRAZOLE SODIUM 40 MG: 40 INJECTION, POWDER, FOR SOLUTION INTRAVENOUS at 10:47

## 2020-03-08 RX ADMIN — SODIUM CHLORIDE: 9 INJECTION, SOLUTION INTRAVENOUS at 08:07

## 2020-03-08 RX ADMIN — SODIUM CHLORIDE 0.1 UNITS/HR: 9 INJECTION, SOLUTION INTRAVENOUS at 05:40

## 2020-03-08 RX ADMIN — ALBUTEROL SULFATE 4 PUFF: 90 AEROSOL, METERED RESPIRATORY (INHALATION) at 19:59

## 2020-03-08 RX ADMIN — SODIUM CHLORIDE 3000 ML: 9 INJECTION, SOLUTION INTRAVENOUS at 04:50

## 2020-03-08 RX ADMIN — SODIUM CHLORIDE, PRESERVATIVE FREE 10 ML: 5 INJECTION INTRAVENOUS at 10:49

## 2020-03-08 RX ADMIN — DOPAMINE HYDROCHLORIDE 5 MCG/KG/MIN: 160 INJECTION, SOLUTION INTRAVENOUS at 20:31

## 2020-03-08 RX ADMIN — Medication 10 ML: at 10:50

## 2020-03-08 RX ADMIN — CHLORHEXIDINE GLUCONATE 0.12% ORAL RINSE 15 ML: 1.2 LIQUID ORAL at 20:32

## 2020-03-08 RX ADMIN — MIDAZOLAM HYDROCHLORIDE 1 MG/HR: 5 INJECTION, SOLUTION INTRAMUSCULAR; INTRAVENOUS at 09:20

## 2020-03-08 RX ADMIN — ASPIRIN 81 MG 81 MG: 81 TABLET ORAL at 14:52

## 2020-03-08 RX ADMIN — SODIUM BICARBONATE: 84 INJECTION, SOLUTION INTRAVENOUS at 18:38

## 2020-03-08 RX ADMIN — CEFEPIME HYDROCHLORIDE 2 G: 2 INJECTION, POWDER, FOR SOLUTION INTRAVENOUS at 04:55

## 2020-03-08 RX ADMIN — Medication 10 ML: at 04:22

## 2020-03-08 RX ADMIN — DOPAMINE HYDROCHLORIDE 5 MCG/KG/MIN: 160 INJECTION, SOLUTION INTRAVENOUS at 08:39

## 2020-03-08 RX ADMIN — Medication 25 MCG/HR: at 14:47

## 2020-03-08 RX ADMIN — PROPOFOL 10 MCG/KG/MIN: 10 INJECTION, EMULSION INTRAVENOUS at 03:50

## 2020-03-08 RX ADMIN — Medication 2 PUFF: at 19:59

## 2020-03-08 RX ADMIN — ACETAMINOPHEN 650 MG: 325 TABLET ORAL at 15:12

## 2020-03-08 RX ADMIN — VANCOMYCIN HYDROCHLORIDE 1250 MG: 5 INJECTION, POWDER, LYOPHILIZED, FOR SOLUTION INTRAVENOUS at 17:51

## 2020-03-08 RX ADMIN — PIPERACILLIN AND TAZOBACTAM 3.38 G: 3; .375 INJECTION, POWDER, LYOPHILIZED, FOR SOLUTION INTRAVENOUS at 13:25

## 2020-03-08 RX ADMIN — PROPOFOL 45 MCG/KG/MIN: 10 INJECTION, EMULSION INTRAVENOUS at 07:13

## 2020-03-08 RX ADMIN — LACTULOSE 30 G: 10 SOLUTION ORAL at 05:30

## 2020-03-08 RX ADMIN — CHLORHEXIDINE GLUCONATE 0.12% ORAL RINSE 15 ML: 1.2 LIQUID ORAL at 10:45

## 2020-03-08 RX ADMIN — CALCIUM GLUCONATE 1 G: 98 INJECTION, SOLUTION INTRAVENOUS at 16:43

## 2020-03-08 ASSESSMENT — PULMONARY FUNCTION TESTS
PIF_VALUE: 23
PIF_VALUE: 12
PIF_VALUE: 11
PIF_VALUE: 21

## 2020-03-08 ASSESSMENT — PAIN SCALES - GENERAL: PAINLEVEL_OUTOF10: 0

## 2020-03-08 NOTE — PROGRESS NOTES
[address] [place] [address]  HOSPITALIST PROGRESS NOTE                       Name:  Uriel Grimm /Age/Sex: 1965  (62 y.o. male)   MRN & CSN:  [id number] & [id number] Admission Date/Time: 3/8/2020  3:44 AM   Location:  -A Attending:  Olu Gao MD                                                  HPI  Uriel Grimm is a 62 y.o. male who presents with acute encephalopathy-intubated in the field    SUBJECTIVE  - sedated, per family has been complaining of shortness of breath for the last two months, ?constipation per family last few days. ROS-unable to complete due to sedation      ALLERGIES: No Known Allergies    PCP: Jacqui Marquez PA-C    PAST MEDICAL HISTORY, SURGICAL HISTORY, SOCIAL HISTORY and  HOME MEDICATIONS all reviewed. OBJECTIVE  Vitals:    20 0615 20 0715 20 0747 20 0748   BP: (!) 133/49      Pulse: 95   (!) 46   Resp:    Temp:  97.9 °F (36.6 °C)     TempSrc:  Rectal     SpO2: 96%  95% 95%   Weight:  220 lb 14.4 oz (100.2 kg)     Height:           PHYSICAL EXAM   GEN sedated male, in NAD  EYES Pupils are equally round. No scleral erythema, discharge, or conjunctivitis. HENT Mucous membranes are dry. Oral pharynx without exudates, no evidence of thrush. NECK Supple, no apparent thyromegaly or masses. RESP Unlabored respiration, CTA bilaterally  CARDIO/VASC S1/S2 auscultated. Regular rate without appreciable murmurs, rubs, or gallops. No JVD or carotid bruits. Peripheral pulses equal bilaterally and palpable. No peripheral edema. GI Abdomen is soft, exam limited by sedation. HEME/LYMPH No palpable cervical lymphadenopathy and no hepatosplenomegaly. No petechiae or ecchymoses. MSK Spontaneous movement of all extremities. No gross joint deformities. SKIN Normal coloration, warm, dry. NEURO sedated    INTAKE: No intake/output data recorded. OUTPUT: No intake/output data recorded.     LABS  Recent Labs     20  0345   WBC 19.4*   HGB 12.1* HCT 42.1         Recent Labs     03/08/20  0345      K 4.9   CL 95*   CO2 9*   BUN 15   CREATININE 1.5*     Recent Labs     03/08/20  0345   AST 28   ALT 25   BILITOT 1.2*   ALKPHOS 121     No results for input(s): INR in the last 72 hours. Recent Labs     03/08/20  0345   TROPONINT 0.020*          Abnormal labs for today noted      Imaging:     ECHO:    Microbiology:  Blood culture:    Urine culture:    Sputum culture:    Procedures done this admission:    MEDS  Scheduled Meds:   sodium chloride flush  10 mL Intravenous BID    sodium chloride flush  10 mL Intravenous 2 times per day    enoxaparin  40 mg Subcutaneous Daily    piperacillin-tazobactam  3.375 g Intravenous Q8H    lactulose  20 g Oral 4 times per day    pantoprazole  40 mg Intravenous Daily    insulin lispro  0-12 Units Subcutaneous Q6H     Continuous Infusions:   propofol 45 mcg/kg/min (03/08/20 0713)    dextrose      sodium chloride       PRN Meds:glucose, dextrose, glucagon (rDNA), dextrose, sodium chloride flush, acetaminophen **OR** acetaminophen        ASSESSMENT and PLAN  Hospital Day: 1    1-Probable severe sepsis- CT shows uncomplicated diverticulitis which somewhat out of proportion to severity of sepsis- cultures ordered, broad spectrum abx for now  2-Severe metabolic acidosis- mainly lactic- repeat labs pending, with JUSTIN- continue IVF and consult nephro. Family declines IV drug abuse- though tox screen positive for amphetamines  3-Acute respiratory failure due to severe metabolic acidosis- CT chest non-acute, on the vent. pulm consulted. 4-Acute metabolic encephalopathy due to above, ammonia level was 470- normal LFTs- CT with no cirrhosis- repeat ammonia level pending, CT head non-acute, if not waking up more- will consider MRI  5-Hyperglycemia- hemoglobin A1C pending, SS  6-Elevated trop- cycle, echo and consult cardio  7-? Alcoholism, CIWA once extubated.       -family reports he is not on any prescription

## 2020-03-08 NOTE — ED PROVIDER NOTES
Triage Chief Complaint:   Altered Mental Status    Alutiiq:  Chun Funez is a 62 y.o. male that presents having rolled off the couch and become acutely combative. Per EMS patient was flailing about wildly. They gave him ketamine IM twice and ended up intubating him in the field. They arrived to the emergency department with patient intubated but with a strong pulse. Review of systems unable to be obtained by the patient. Per the wife he has been complaining of abdominal and flank pain for the last several days. No headache or trauma recently. No fevers chills recently. No nuchal rigidity or neck pain recently. No HEENT NT infection or complaint of headaches recently. Past Medical History:   Diagnosis Date    Anxiety      No past surgical history on file.   Family History   Problem Relation Age of Onset    Coronary Art Dis Mother     Cancer Father         tongue    Diabetes Other         aunt     Social History     Socioeconomic History    Marital status: Single     Spouse name: Not on file    Number of children: Not on file    Years of education: Not on file    Highest education level: Not on file   Occupational History    Not on file   Social Needs    Financial resource strain: Not on file    Food insecurity     Worry: Not on file     Inability: Not on file    Transportation needs     Medical: Not on file     Non-medical: Not on file   Tobacco Use    Smoking status: Never Smoker    Smokeless tobacco: Current User     Types: Snuff   Substance and Sexual Activity    Alcohol use: Yes     Comment: DAILY    Drug use: Never    Sexual activity: Not on file   Lifestyle    Physical activity     Days per week: Not on file     Minutes per session: Not on file    Stress: Not on file   Relationships    Social connections     Talks on phone: Not on file     Gets together: Not on file     Attends Jain service: Not on file     Active member of club or organization: Not on file     Attends Sabine Ordoñez MD        insulin lispro (HUMALOG) injection vial 0-12 Units  0-12 Units Subcutaneous Q6H Sabine Ordoñez MD         No Known Allergies    Nursing Notes Reviewed    Physical Exam:  ED Triage Vitals [03/08/20 0347]   Enc Vitals Group      BP (!) 182/61      Pulse 62      Resp 20      Temp       Temp src       SpO2 100 %      Weight 220 lb 7.4 oz (100 kg)      Height 5' 10\" (1.778 m)      Head Circumference       Peak Flow       Pain Score       Pain Loc       Pain Edu? Excl. in [address]? My pulse ox interpretation is - normal    General appearance: Intubated  Skin:  Warm. Dry. No petechiae or purpura. Eye: Fixed and dilated at 4 mm  Ears, nose, mouth and throat:  Oral mucosa moist, no trismus. Oropharynx with no exudate or erythema. Neck:  Trachea midline. Supple. No cervical lymphadenopathy  Extremity: No deformities bruising or skin tears  Heart:  Regular rate and rhythm, normal S1 & S2, no extra heart sounds. Perfusion:  Intact, capillary refill l 3 to 4 seconds  Respiratory:  Lungs clear to auscultation bilaterally. Bags easily  Abdominal:  Normal bowel sounds. Soft. Back: No bruising  Neurological: Occasionally overbreathing the vent but otherwise unresponsive. Psychiatric: Unable to be assessed    I have reviewed and interpreted all of the currently available lab results from this visit (if applicable):  Results for orders placed or performed during the hospital encounter of 03/08/20   Blood Gas, Venous   Result Value Ref Range    pH, Juan 6.84 (L) 7.32 - 7.42    pCO2, Juan 42 38 - 52 mmHG    pO2, Juan 64 (H) 28 - 48 mmHG    Base Excess 27  MINUS   (H) 0 - 3.3    HCO3, Venous 7.2 (L) 19 - 25 MMOL/L    O2 Sat, Juan 79.9 (H) 50 - 70 %    Comment VBG    Acetaminophen level   Result Value Ref Range    Acetaminophen Level <5.0 (L) 15 - 30 ug/ml    DOSE AMOUNT DOSE AMT.  GIVEN - UNKNOWN     DOSE TIME DOSE TIME GIVEN - UNKNOWN    Salicylate   Result Value Ref Range Salicylate Lvl <4.8 (L) 15 - 30 MG/DL    DOSE AMOUNT       DOSE AMT. GIVEN - Unknown  DOSE AMT. GIVEN -  patient denies ingestion      DOSE TIME       DOSE TIME GIVEN - Unknown  DOSE TIME GIVEN - patient denies ingestion     ETOH Blood   Result Value Ref Range    Alcohol Scrn <0.01  THE VALUE IS BELOW OUR DETECTION LIMIT.    <0.01 %WT/VOL   Ammonia Level   Result Value Ref Range    Ammonia (HH) 16 - 60 UMOL/L     470  NH3 CALLED TO [facility] [id number] [facility]   RESULTS READ BACK     CBC w/ auto diff   Result Value Ref Range    WBC 19.4 (H) 4.0 - 10.5 K/CU MM    RBC 4.07 (L) 4.6 - 6.2 M/CU MM    Hemoglobin 12.1 (L) 13.5 - 18.0 GM/DL    Hematocrit 42.1 42 - 52 %    .4 (H) 78 - 100 FL    MCH 29.7 27 - 31 PG    MCHC 28.7 (L) 32.0 - 36.0 %    RDW 14.6 11.7 - 14.9 %    Platelets 398 190 - 309 K/CU MM    MPV 11.0 7.5 - 11.1 FL    Differential Type AUTOMATED DIFFERENTIAL     Segs Relative 66.8 (H) 36 - 66 %    Lymphocytes % 21.0 (L) 24 - 44 %    Monocytes % 10.1 (H) 0 - 4 %    Eosinophils % 0.6 0 - 3 %    Basophils % 0.6 0 - 1 %    Segs Absolute 13.0 K/CU MM    Lymphocytes Absolute 4.1 K/CU MM    Monocytes Absolute 2.0 K/CU MM    Eosinophils Absolute 0.1 K/CU MM    Basophils Absolute 0.1 K/CU MM    Nucleated RBC % 0.0 %    Total Nucleated RBC 0.0 K/CU MM    TRC 0.0944 K/CU MM    Total Immature Neutrophil 0.17 K/CU MM    Immature Neutrophil % 0.9 (H) 0 - 0.43 %   CMP   Result Value Ref Range    Sodium 135 135 - 145 MMOL/L    Potassium 4.9 3.5 - 5.1 MMOL/L    Chloride 95 (L) 99 - 110 mMol/L    CO2 9 (L) 21 - 32 MMOL/L    BUN 15 6 - 23 MG/DL    CREATININE 1.5 (H) 0.9 - 1.3 MG/DL    Glucose 343 (H) 70 - 99 MG/DL    Calcium 9.3 8.3 - 10.6 MG/DL    Alb 4.1 3.4 - 5.0 GM/DL    Total Protein 6.4 6.4 - 8.2 GM/DL    Total Bilirubin 1.2 (H) 0.0 - 1.0 MG/DL    ALT 25 10 - 40 U/L    AST 28 15 - 37 IU/L    Alkaline Phosphatase 121 40 - 128 IU/L    GFR Non- 48 (L) >60 mL/min/1.73m2    GFR  diverticulitis of the distal   descending colon. Otherwise no gross evidence of acute intra-process. Small bilateral fat containing inguinal hernias. CT Head WO Contrast   Final Result   Motion limited study. No gross evidence of acute intracranial abnormality. XR CHEST PORTABLE    (Results Pending)         EKG (if obtained): (All EKG's are interpreted by myself in the absence of a cardiologist)    Chart review shows recent radiographs:  No results found. MDM:  22-year-old male with history as above. Patient arrives intubated but overbreathing vent. Propofol started for sedation. To head CT emergently as well as CTA chest abdomen pelvis to rule out acute intracranial bleed or dissection or aneurysm in the setting of hypertensive combative patient with a history of several days abdominal pain prior to presentation. VBG with a pH of 6.84. Bicarb given. .  Will increase respirations but this appears to be primarily metabolic. No history of diabetes. Blood sugar in the field was in the 100s by report however fingerstick here is elevated    0445: ABG pH 7.3, PCO2 26, PO2 355. White blood cell count is elevated. Cultures have been sent. 1 mg IV Dilaudid and propofol and patient is comfortable on the vent at this time. 0500: Patient is found to be severe metabolic acidosis low bicarb, elevated anion gap, elevated blood sugar. Beta hydroxy negative cefepime and vancomycin for diverticulitis. Lactulose for an ammonia of 400. Aggressive fluid bolus for sepsis    No evidence of  ST elevation MI. Troponin slightly elevated. Suspect demand mismatch. Will need to be trended. Initial bradycardia seems to be correcting and heart rate now 93 once acidosis is been corrected. Sepsis being treated with 30 cc/kg fluid bolus, cefepime and vancomycin. Blood and urine cultures have been sent. Lactic elevated will need to be repeated.   Patient remains hemodynamically stable with no

## 2020-03-08 NOTE — CONSULTS
[address] [place] [address], [facility]                                  CONSULTATION    PATIENT NAME: Fara Langford                       :        1965  MED REC NO:   [id number]                          ROOM:       2110  ACCOUNT NO:   [de-identified]                           ADMIT DATE: 2020  PROVIDER:     Cindy Baker MD    CONSULT DATE:  2020    HISTORY OF PRESENT ILLNESS:  The patient is a 28-year-old gentleman with  history of anxiety disorder, alcohol abuse, who was brought to the  emergency room because of altered mental status. According to the  patient's wife, the patient had couple of beers and then slept in the  couch early this morning. He became confused and combative. EMS was  called. He was brought to the emergency room. He was also hypoxic. His initial ABGs showed severe metabolic acidosis. His initial venous  blood gasses showed severe metabolic acidosis. He had ammonia level of  470 and lactic acid of 19. His anion gap was 31. He was intubated,  placed on the ventilator, and was subsequently admitted to the intensive  care unit. There was no prior history of any significant cough, fever,  chills, nausea, or vomiting. PAST MEDICAL HISTORY:  Significant for anxiety. PAST SURGICAL HISTORY:  Noncontributory. FAMILY HISTORY:  Reveals that his mother had coronary artery disease. Father had tongue cancer. SOCIAL HISTORY:  Reveals that he is a nonsmoker. He drinks alcohol on  regular basis. No history of drug abuse. MEDICATIONS:  Reviewed. ALLERGIES:  He has no known allergies. REVIEW OF SYSTEMS:  Unobtainable at this time. PHYSICAL EXAMINATION:  GENERAL:  The patient is sedated, on the vent, in no acute respiratory  distress. VITAL SIGNS:  His blood pressure is 126/48 mmHg, pulse of 81 per minute,  and respiratory rate of 20 per minute. He is afebrile.   His T-max was  99 degrees Fahrenheit, his saturation is 98% on 50% FiO2. HEENT:  Essentially unremarkable. He is orally intubated. NECK  Supple. There is no JVD. No lymphadenopathy. LUNGS:  Revealed diminished breath sounds. Occasional basilar crackles. HEART:  Showed normal S1 and S2. There was no S3 or S4 noted. ABDOMEN:  Benign. There is no evidence of any organomegaly. The bowel  sounds are present. LABORATORY DATA:  His chest x-ray showed ET tube 4.5 cm above the  eas. Pulmonary vascular congestion, mild cardiomegaly. His ammonia  was 470. His lactate was 19.7. His electrolytes showed a sodium of  135, potassium 4.9, chloride 95, carbon dioxide 9, BUN 15, and  creatinine 1.5. His magnesium is 2.6. His initial venous blood gases  showed a pH of 6.84, pCO2 of 42, pO2 of 64. His followup blood gases  showed a pH of 7.30, pCO2 of 26, pO2 of 355 with 97% saturation. His  ABGs this morning showed a pH of 7.36, pCO2 of 30, pO2 of 57 with 89%  saturation. His urine drug screen was positive for amphetamines. His  electrolytes showed a sodium of 137, potassium 4.1, chloride 104, carbon  dioxide 19, BUN 19, and creatinine 1.7. His CBC showed a white count of  12.8, hemoglobin 10.3, and hematocrit 33.1. IMPRESSION:  1. Acute respiratory failure. 2.  Possible sepsis syndrome. 3.  Acute metabolic acidosis. 4.  Possible hepatic encephalopathy. 5.  Possible underlying pneumonia. PLAN:  1. Continue full ventilator support. 2.  Continue broad-spectrum antibiotic coverage. 3.  Sputum for culture and sensitivity. 4.  Wean FiO2.  5.  DuoNeb q.4 h. while awake. 6.  Check mag and phos. 7.  Discussed with the family in detail. 8.  As per orders.         Quentin Mckeon MD    D: 03/08/2020 12:51:48       T: 03/08/2020 13:36:59     /[email or web address]  Job#: [id number]     Doc#: [id number]    CC:

## 2020-03-08 NOTE — H&P
reduce the radiation dose to as low as reasonably achievable. COMPARISON: None. HISTORY: ORDERING SYSTEM PROVIDED HISTORY: AMS TECHNOLOGIST PROVIDED HISTORY: Has a \"code stroke\" or \"stroke alert\" been called? ->No Reason for exam:->AMS Reason for Exam: ams Acuity: Acute Type of Exam: Initial Additional signs and symptoms: Pt presents to ED via EMS for combative behavior. EMS gave an initial dose of 250mg IM of ketamine used to sedate pt. That helped calm him down. Wife reports hx of drug use. O2 was in the 40% when EMS was able to assess him. 500mg of ketamine was given total. Pt intubated upon ems arrival. Relevant Medical/Surgical History: pt intubated resp motion FINDINGS: Motion limited study. BRAIN/VENTRICLES: There is no acute intracranial hemorrhage, mass effect or midline shift. No abnormal extra-axial fluid collection. The gray-white differentiation is maintained without evidence of an acute infarct. There is no evidence of hydrocephalus. ORBITS: The visualized portion of the orbits demonstrate no acute abnormality. SINUSES: The visualized paranasal sinuses and mastoid air cells demonstrate no acute abnormality. SOFT TISSUES/SKULL:  No acute abnormality of the visualized skull or soft tissues. Motion limited study. No gross evidence of acute intracranial abnormality. Xr Chest Portable    Result Date: 3/8/2020  EXAMINATION: ONE XRAY VIEW OF THE CHEST 3/8/2020 4:04 am COMPARISON: Same day CTA of the chest, abdomen and pelvis. HISTORY: ORDERING SYSTEM PROVIDED HISTORY: ams TECHNOLOGIST PROVIDED HISTORY: Reason for exam:->ams Reason for Exam: AMS Acuity: Acute FINDINGS: The tip of the ET tube is 4.5 cm above the esa. A transesophageal gastric tube courses beyond the field of view. The distal side port projects over the stomach. The lungs are underinflated, resulting in vascular crowding and subsegmental atelectasis. There is central pulmonary vascular congestion. No sizable effusion.   Limited and kidneys are grossly unremarkable. No radiodense gallstones. GI/Bowel: No bowel obstruction. Normal caliber appendix. Colonic diverticulosis with apparent inflamed diverticulum along the distal descending colon with mild pericolonic fat stranding suggestive of acute diverticulitis. No extraluminal air or fluid collection. Pelvis: Urinary bladder and prostate gland within normal limits. Peritoneum/Retroperitoneum: Small bilateral fat containing inguinal hernias. No free air, fluid, or lymphadenopathy. Bones/Soft Tissues: No acute osseous abnormality. Motion limited study. No gross evidence of acute intrathoracic process. Findings suggestive of acute uncomplicated diverticulitis of the distal descending colon. Otherwise no gross evidence of acute intra-process. Small bilateral fat containing inguinal hernias. EKG this visit:  Personally reviewed EKG and telemetry strip. Documents:  Recent previous records, labs, imaging in the EMR were reviewed. Assessments and plans: Active Problems:    Sepsis ([address]  Resolved Problems:    * No resolved hospital problems. *    · Severe sepsis with confusion, tachypnea, lactic acidosis, due to diverticulitis without perforation. Will give zosyn to cover GNR and anaerobes. · Severe metabolic AG acidosis, likely lactic acidosis. No DKA with negative beta hydroxybutirate. The cause of lactic acidosis is unclear at this time. He is not in shock, and Abd CT did not show evidence of bowel ischemia. PH is improving rapidly since he is on ventilator. Continue aggressive iv hydration, broad spectrum antibioitcs, enough oxygenation. Recheck lactate in 2h.    · Hyperglycemia with newly found diabetes, without DKA. [place] sliding scale q6h. Check A1c. · Acute respiratory failure on ventilator. Continue vent management with assistance of pulmonology. Mechanical vent protocol. PPI, daily awakening, pulmonary toilet.     · Acute metabolic/ septic encephalopathy, with Non-cirrhotic hyperammonemia. CT head was negative. Lactulose 20g q6h via NG tube. Treat sepsis as above. Continue other supportive tratment and assess mental status during the sedation vacation. Repeat ammonia in am.  Urine tox screen pending. · JUSTIN due to sepsis and hypoxia. Monitor BMP and UOP. · Elevated trop 0.02. Will check serial trop x2. · Daily alcohol use. He may be at risk of withdrawal.  Pt on propofol. Monitor VS and mental status. DVT prophylaxis - lovenox.    GI prophylaxis - protonx    Code - full  Lines - ryan cath, right IJ CVC    PHYSICIAN CERTIFICATION  I certify that Dawne Closs is expected to be hospitalized for greater than 2 midnights based on the above assessment and plan:      MD Martin Nicholse Hospitalist at [facility]  Office Phone: [phone] 23-Jul-2019 10:01

## 2020-03-08 NOTE — CONSULTS
kg/m²   General appearance: in no acute distress, appears stated age  Skin: Skin color, texture, turgor normal.   HEENT: normocephalic, atraumatic, et tube in place  Neck: supple, trachea midline  Lungs: decreased breath sounds in the bases, breathing comfortably on mv  Heart[de-identified] regular rate and rhythm, S1, S2 normal,  Abdomen: soft, non-tender; bowel sounds normal; no masses,   Extremities: extremities normal, atraumatic, no cyanosis or edema  Neurologic: Mental status: sedated  Psychiatric: mood and affect can not be assessed    CBC:   Recent Labs     03/08/20  0345   WBC 19.4*   HGB 12.1*        BMP:    Recent Labs     03/08/20 0345      K 4.9   CL 95*   CO2 9*   BUN 15   CREATININE 1.5*   GLUCOSE 343*     Hepatic:   Recent Labs     03/08/20 0345   AST 28   ALT 25   BILITOT 1.2*   ALKPHOS 121     Troponin: No results for input(s): TROPONINI in the last 72 hours. BNP: No results for input(s): BNP in the last 72 hours. Lipids: No results for input(s): CHOL, HDL in the last 72 hours. Invalid input(s): LDLCALCU  ABGs:   Lab Results   Component Value Date    PO2ART 57 03/08/2020    FIM1UQU 30.0 03/08/2020     INR:   Recent Labs     03/08/20  0700   INR 1.26       No intake/output data recorded. No intake/output data recorded.      -----------------------------------------------------------------      Assessment and Recommendations   - ANIOn gap metabolic acidosis:  Bicarb level of 9 //Serum acetaminophen and salicylate, ethanol and BHA were negative.//likely from lactic acidosis//no HN on CT A/P  - JUSTIN  - Acute hypoxic resp failure  - Polysusbstance abuse: amphetamines positive as of now  - Hyperglycemia  - Leukocytosis  - Hyperammonemia    Plan;  Suggest:  - bicarb drip in sterile water  - monitor ionized calcium every 8hs while on bicarb gtt and replete prn  - obtain osmolal gap, needs a measured serum osmolality  - continue NS until current bag is empty  - repeat a critical care panel at

## 2020-03-08 NOTE — CONSULTS
[facility]  consulted by Dr. Annamaria Cueva for monitoring and adjustment. Indication for treatment: Severe sepsis  Goal trough: 15-20 mcg/mL  Other antimicrobials:  Zosyn     Pertinent Laboratory Values:   Temp Readings from Last 3 Encounters:   03/08/20 97.9 °F (36.6 °C) (Rectal)   01/07/20 97.7 °F (36.5 °C)     Recent Labs     03/08/20  0345 03/08/20  0700   WBC 19.4*  --    LACTATE 19.7  LACTIC CALLED TO [facility]   RESULTS READ BACK  * 3.0*     Recent Labs     03/08/20  0345   BUN 15   CREATININE 1.5*     Estimated Creatinine Clearance: 65 mL/min (A) (based on SCr of 1.5 mg/dL (H)). No intake or output data in the 24 hours ending 03/08/20 1006    Pertinent Cultures:  Date    Source    Results  3/8   Blood    Pending             Vancomycin level:   TROUGH:  No results for input(s): VANCOTROUGH in the last 72 hours. RANDOM:  No results for input(s): VANCORANDOM in the last 72 hours. Assessment:  WBC and temperature: WBC elevated @19.4, pt afebrile  SCr, BUN, and urine output: JUSTIN, SCR elevated @1.5, no chronic renal issues noted  Day(s) of therapy:  1  Vancomycin level: scheduled for 3/10 @05:30    Plan:  Dosing comments: The patient received vancomycin 2gm ivpb x1 dose this morning. Start vancomycin 1,250mg ivpb q12h  Will check the vanco level before the 4th dose  Pharmacy will continue to monitor patient and adjust therapy as indicated    Kendy 3/10 @05:30    Thank you for the consult. Dawood Mcwilliams  3/8/2020 10:06 AM

## 2020-03-08 NOTE — PROGRESS NOTES
Angelina Alvarez remains intubated on AC/VC+ RR 20, 550 ml, 50% and 8+.       (NO, Did not meet criteria)Weaning trial today? (Increased RR 20, PEEP 8+)Ventilator setting changes? (NO) Any changes in Respiratory Therapy interventions? (Yes, added Duoneb) Any new Respiratory Therapy medications? (Central Line placement, Intubated) Any procedures/ surgeries today? Summary of comments:  Davie West is on full ventilator support (see notes), he is sedated and restrained, he currently has multiple deficiencies. He will be reassessed for weaning once his cardio-pulmonary status allows. Results for Luis Keating (MRN [id number]) as of 3/8/2020 13:23   Ref. Range 3/8/2020 10:00   pH, Bld Latest Ref Range: 7.34 - 7.45  7.36   Base Excess Latest Ref Range: 0 - 3.3  7... (H)   O2 Sat Latest Ref Range: 96 - 97 % 89.1 (L)   Carbon Monoxide, Blood Latest Ref Range: 0 - 5 % 2.1   CO2 Content Latest Ref Range: 19 - 24 MMOL/L 17.8 (L)   pCO2, Arterial Latest Ref Range: 32 - 45 MMHG 30.0 (L)   pO2, Arterial Latest Ref Range: 75 - 100 MMHG 57 (L)   HCO3, Arterial Latest Ref Range: 18 - 23 MMOL/L 16.9 (L)   Methemoglobin, Arterial Latest Ref Range: <1.5 % 1.3     EXAMINATION:   CTA OF THE CHEST, ABDOMEN AND PELVIS WITH CONTRAST, 3/8/2020 4:18 am       TECHNIQUE:   CTA of the chest, abdomen and pelvis was performed after the administration   of intravenous contrast.  Multiplanar reformatted images are provided for   review.  MIP images are provided for review. Dose modulation, iterative   reconstruction, and/or weight based adjustment of the mA/kV was utilized to   reduce the radiation dose to as low as reasonably achievable.       COMPARISON:   None.       HISTORY:   ORDERING SYSTEM PROVIDED HISTORY: AMS   TECHNOLOGIST PROVIDED HISTORY:   Reason for exam:->AMS   Reason for Exam: ams pt intubated   Acuity: Acute   Type of Exam: Initial   Additional signs and symptoms: Pt presents to ED via EMS for combative   behavior.  EMS gave an

## 2020-03-08 NOTE — CONSULTS
[address] [facility]               [address], [address] [facility]                                  CONSULTATION    PATIENT NAME: Keith Gonzales                       :        1965  MED REC NO:   [id number]                          ROOM:       2110  ACCOUNT NO:   [de-identified]                           ADMIT DATE: 2020  PROVIDER:     Rachna Baca MD    CONSULT DATE:  2020    INDICATION:  Bradycardia. HISTORY OF PRESENT ILLNESS:  This is a 47-year-old male patient whose  wife, sister, and mother all are at bedside and discussed with them and  seeing the patient. The patient came to the ER with having respiratory distress. The  patient has presented to the ED with combative behavior. The wife said  that he yesterday the patient rolled off the couch and became acutely  combative. He was slaying around. Therefore the EMS was called. He  was intubated with a Ketamine. He was intubated in the field. No CPR  was performed. The patient had strong pulse present. Then, he is on  vent at this time. The patient's sister said that the patient works with them and they work  together. The patient has been complaining of dyspnea present with  exertion present. He is unable to walk a long fishman distance. He also  been complaining of dizziness present, but no chest pain was noted. His pH was 6.8 and bicarb was low, so he is on bicarb drip right now. The patient is not on any medications at home. PAST MEDICAL HISTORY:  None. PAST SURGICAL HISTORY:  None. SOCIAL HISTORY:  He smokes and drinks. Also there is history of having  drug abuse present and possibly some abuse still present. ALLERGIES:  NKDA. PHYSICAL EXAMINATION:  GENERAL:  The patient is on vent and is intubated. The patient's  primary provider is Dr. Cuca duff. VITAL SIGNS:  Temperature is afebrile, pulse is in 80s, on 5 mcg of  dopamine.   Blood pressure is 140s present. HEENT:  Head is normocephalic. Pupils are not reactive. The patient is  on vent and sedated. CHEST:  Equal expansion. LUNGS:  Clear to auscultation. No wheezing or rhonchi. HEART:  Regular rhythm. ABDOMEN:  Soft, nontender. Bowel sounds present. EXTREMITIES:  No cyanosis, clubbing, or edema present. NEUROLOGIC:  Cranial nerves cannot be evaluated. The patient had an EKG done. EKG shows a 2:1 block present with sinus  rhythm with a right bundle branch block present. LABORATORY DATA:  Potassium is 4.1, BUN is 19, creatinine 1.7. Calcium  is low and lactic acid is 1.3. Actually lactic acid did improved from  19.7 down to 3. Troponin is elevated 0.134. CPK is elevated. Ammonia  was elevated at 47 and is down to 34. Otherwise LFTs are normal.  CBC  normal.  TSH normal.  Urine drug screen was positive for amphetamines. CBC is within normal range. Platelet count is normal.  INR is  therapeutic. CT chest, abdomen and pelvis with contrast was performed. All showed  limited study. No gross evidence of acute intrathoracic process noted. Findings suggestive of acute uncomplicated diverticulitis of the distal  descending colon. Otherwise no gross evidence of acute intraabdominal  process present. CT of the head was also negative. No central PE was  noted. IMPRESSION:  1. This is a 75-year-old male patient who was admitted to the hospital  with mental status changes present, respiratory failure, intubated and  on vent. 2.  From cardiac stand, he has 2:1 block present and right bundle branch  block present. The patient has been complaining of dizziness and  shortness of breath present. PLAN:  1. The patient may need a pacemaker so we will watch that. 2.  We will do cardiac workup if his _____ stable. Stress test and  probably heart cath _____ hospital course. Further recommendations  based on the hospital course.         Jaycob Ortega MD    D: 03/08/2020 14:02:47 T: 03/08/2020 14:53:34     [email or web address]  Job#: [id number]     Doc#: [id number]    CC:

## 2020-03-08 NOTE — PROGRESS NOTES
Pt arrived to unit via ER gurney, transferred to ICU bed and placed on ICU monitoring. Pt is without skin issues at this time, skin assessment completed with Ariadna Seek. Only pt belongings were pt's socks at this time. 3L boluses infusing to gravity upon arrival with propfol at 30mcg/kg/hr. This RN to continue to monitor.

## 2020-03-09 ENCOUNTER — APPOINTMENT (OUTPATIENT)
Dept: INTERVENTIONAL RADIOLOGY/VASCULAR | Age: 55
DRG: 853 | End: 2020-03-09
Payer: COMMERCIAL

## 2020-03-09 ENCOUNTER — APPOINTMENT (OUTPATIENT)
Dept: GENERAL RADIOLOGY | Age: 55
DRG: 853 | End: 2020-03-09
Payer: COMMERCIAL

## 2020-03-09 LAB
ALBUMIN SERPL-MCNC: 3.7 GM/DL (ref 3.4–5)
ALP BLD-CCNC: 114 IU/L (ref 40–128)
ALT SERPL-CCNC: 29 U/L (ref 10–40)
AMMONIA: 17 UMOL/L (ref 16–60)
ANION GAP SERPL CALCULATED.3IONS-SCNC: 17 MMOL/L (ref 4–16)
AST SERPL-CCNC: 52 IU/L (ref 15–37)
BASE EXCESS MIXED: ABNORMAL (ref 0–1.2)
BASOPHILS ABSOLUTE: 0 K/CU MM
BASOPHILS RELATIVE PERCENT: 0.5 % (ref 0–1)
BILIRUB SERPL-MCNC: 0.8 MG/DL (ref 0–1)
BUN BLDV-MCNC: 29 MG/DL (ref 6–23)
CALCIUM IONIZED: 3.72 MG/DL (ref 4.48–5.28)
CALCIUM IONIZED: 3.88 MG/DL (ref 4.48–5.28)
CALCIUM IONIZED: 3.96 MG/DL (ref 4.48–5.28)
CALCIUM SERPL-MCNC: 7.9 MG/DL (ref 8.3–10.6)
CARBON MONOXIDE, BLOOD: 1.7 % (ref 0–5)
CHLORIDE BLD-SCNC: 101 MMOL/L (ref 99–110)
CO2 CONTENT: 24.9 MMOL/L (ref 19–24)
CO2: 21 MMOL/L (ref 21–32)
COMMENT: ABNORMAL
CREAT SERPL-MCNC: 2 MG/DL (ref 0.9–1.3)
DIFFERENTIAL TYPE: ABNORMAL
EKG ATRIAL RATE: 105 BPM
EKG ATRIAL RATE: 30 BPM
EKG DIAGNOSIS: NORMAL
EKG DIAGNOSIS: NORMAL
EKG P AXIS: 268 DEGREES
EKG P-R INTERVAL: 72 MS
EKG Q-T INTERVAL: 286 MS
EKG Q-T INTERVAL: 762 MS
EKG QRS DURATION: 212 MS
EKG QRS DURATION: 58 MS
EKG QTC CALCULATION (BAZETT): 377 MS
EKG QTC CALCULATION (BAZETT): 666 MS
EKG R AXIS: 83 DEGREES
EKG R AXIS: 91 DEGREES
EKG T AXIS: -59 DEGREES
EKG T AXIS: 210 DEGREES
EKG VENTRICULAR RATE: 105 BPM
EKG VENTRICULAR RATE: 46 BPM
EOSINOPHILS ABSOLUTE: 0 K/CU MM
EOSINOPHILS RELATIVE PERCENT: 0.3 % (ref 0–3)
GFR AFRICAN AMERICAN: 42 ML/MIN/1.73M2
GFR NON-AFRICAN AMERICAN: 35 ML/MIN/1.73M2
GLUCOSE BLD-MCNC: 104 MG/DL (ref 70–99)
GLUCOSE BLD-MCNC: 108 MG/DL (ref 70–99)
GLUCOSE BLD-MCNC: 70 MG/DL (ref 70–99)
GLUCOSE BLD-MCNC: 77 MG/DL (ref 70–99)
GLUCOSE BLD-MCNC: 80 MG/DL (ref 70–99)
GLUCOSE BLD-MCNC: 98 MG/DL (ref 70–99)
HCO3 ARTERIAL: 23.8 MMOL/L (ref 18–23)
HCT VFR BLD CALC: 40.4 % (ref 42–52)
HEMOGLOBIN: 12.8 GM/DL (ref 13.5–18)
IMMATURE NEUTROPHIL %: 0.2 % (ref 0–0.43)
IONIZED CA: 0.93 MMOL/L (ref 1.12–1.32)
IONIZED CA: 0.97 MMOL/L (ref 1.12–1.32)
IONIZED CA: 0.99 MMOL/L (ref 1.12–1.32)
LACTATE: 1.8 MMOL/L (ref 0.4–2)
LV EF: 53 %
LVEF MODALITY: NORMAL
LYMPHOCYTES ABSOLUTE: 1 K/CU MM
LYMPHOCYTES RELATIVE PERCENT: 16.5 % (ref 24–44)
LYMPHS CSF: 100 %
MAGNESIUM: 3.2 MG/DL (ref 1.8–2.4)
MCH RBC QN AUTO: 29.5 PG (ref 27–31)
MCHC RBC AUTO-ENTMCNC: 31.7 % (ref 32–36)
MCV RBC AUTO: 93.1 FL (ref 78–100)
METHEMOGLOBIN ARTERIAL: 1.4 %
MONOCYTES ABSOLUTE: 1.3 K/CU MM
MONOCYTES RELATIVE PERCENT: 21.9 % (ref 0–4)
NUCLEATED RBC %: 0 %
O2 SATURATION: 92.2 % (ref 96–97)
OTHER CELLS CSF: ABNORMAL CU MM
OTHER CELLS, CSF: 0 %
PCO2 ARTERIAL: 35 MMHG (ref 32–45)
PDW BLD-RTO: 14.7 % (ref 11.7–14.9)
PH BLOOD: 7.44 (ref 7.34–7.45)
PHOSPHORUS: 6 MG/DL (ref 2.5–4.9)
PLATELET # BLD: 247 K/CU MM (ref 140–440)
PMV BLD AUTO: 10.4 FL (ref 7.5–11.1)
PO2 ARTERIAL: 63 MMHG (ref 75–100)
POTASSIUM SERPL-SCNC: 3.7 MMOL/L (ref 3.5–5.1)
RBC # BLD: 4.34 M/CU MM (ref 4.6–6.2)
RBC CSF: ABNORMAL CU MM
SEGMENTED NEUTROPHILS ABSOLUTE COUNT: 3.6 K/CU MM
SEGMENTED NEUTROPHILS RELATIVE PERCENT: 60.6 % (ref 36–66)
SEGS, CSF: 0 %
SODIUM BLD-SCNC: 139 MMOL/L (ref 135–145)
TOTAL CK: 2469 IU/L (ref 38–174)
TOTAL IMMATURE NEUTOROPHIL: 0.01 K/CU MM
TOTAL NUCLEATED RBC: 0 K/CU MM
TOTAL PROTEIN: 6 GM/DL (ref 6.4–8.2)
TROPONIN T: 0.05 NG/ML
TUBE #: ABNORMAL
WBC # BLD: 5.9 K/CU MM (ref 4–10.5)
WBC CSF: ABNORMAL CU MM (ref 0–5)

## 2020-03-09 PROCEDURE — 2580000003 HC RX 258: Performed by: INTERNAL MEDICINE

## 2020-03-09 PROCEDURE — 4A023N7 MEASUREMENT OF CARDIAC SAMPLING AND PRESSURE, LEFT HEART, PERCUTANEOUS APPROACH: ICD-10-PCS | Performed by: INTERNAL MEDICINE

## 2020-03-09 PROCEDURE — 6370000000 HC RX 637 (ALT 250 FOR IP): Performed by: INTERNAL MEDICINE

## 2020-03-09 PROCEDURE — 2700000000 HC OXYGEN THERAPY PER DAY

## 2020-03-09 PROCEDURE — 89051 BODY FLUID CELL COUNT: CPT

## 2020-03-09 PROCEDURE — 6360000004 HC RX CONTRAST MEDICATION

## 2020-03-09 PROCEDURE — 6360000002 HC RX W HCPCS: Performed by: HOSPITALIST

## 2020-03-09 PROCEDURE — 6360000002 HC RX W HCPCS: Performed by: INTERNAL MEDICINE

## 2020-03-09 PROCEDURE — 2580000003 HC RX 258: Performed by: HOSPITALIST

## 2020-03-09 PROCEDURE — 99223 1ST HOSP IP/OBS HIGH 75: CPT | Performed by: PSYCHIATRY & NEUROLOGY

## 2020-03-09 PROCEDURE — 87071 CULTURE AEROBIC QUANT OTHER: CPT

## 2020-03-09 PROCEDURE — 89220 SPUTUM SPECIMEN COLLECTION: CPT

## 2020-03-09 PROCEDURE — 6360000002 HC RX W HCPCS

## 2020-03-09 PROCEDURE — 83605 ASSAY OF LACTIC ACID: CPT

## 2020-03-09 PROCEDURE — 94003 VENT MGMT INPAT SUBQ DAY: CPT

## 2020-03-09 PROCEDURE — 36600 WITHDRAWAL OF ARTERIAL BLOOD: CPT

## 2020-03-09 PROCEDURE — 94640 AIRWAY INHALATION TREATMENT: CPT

## 2020-03-09 PROCEDURE — C9113 INJ PANTOPRAZOLE SODIUM, VIA: HCPCS | Performed by: HOSPITALIST

## 2020-03-09 PROCEDURE — 82550 ASSAY OF CK (CPK): CPT

## 2020-03-09 PROCEDURE — 6360000002 HC RX W HCPCS: Performed by: CLINICAL NURSE SPECIALIST

## 2020-03-09 PROCEDURE — 83735 ASSAY OF MAGNESIUM: CPT

## 2020-03-09 PROCEDURE — 2580000003 HC RX 258

## 2020-03-09 PROCEDURE — 62328 DX LMBR SPI PNXR W/FLUOR/CT: CPT

## 2020-03-09 PROCEDURE — 2500000003 HC RX 250 WO HCPCS

## 2020-03-09 PROCEDURE — 84100 ASSAY OF PHOSPHORUS: CPT

## 2020-03-09 PROCEDURE — 7100000001 HC PACU RECOVERY - ADDTL 15 MIN

## 2020-03-09 PROCEDURE — 36592 COLLECT BLOOD FROM PICC: CPT

## 2020-03-09 PROCEDURE — 84484 ASSAY OF TROPONIN QUANT: CPT

## 2020-03-09 PROCEDURE — 93005 ELECTROCARDIOGRAM TRACING: CPT | Performed by: INTERNAL MEDICINE

## 2020-03-09 PROCEDURE — 82803 BLOOD GASES ANY COMBINATION: CPT

## 2020-03-09 PROCEDURE — 7100000000 HC PACU RECOVERY - FIRST 15 MIN

## 2020-03-09 PROCEDURE — 2580000003 HC RX 258: Performed by: CLINICAL NURSE SPECIALIST

## 2020-03-09 PROCEDURE — 009U3ZX DRAINAGE OF SPINAL CANAL, PERCUTANEOUS APPROACH, DIAGNOSTIC: ICD-10-PCS | Performed by: RADIOLOGY

## 2020-03-09 PROCEDURE — 82945 GLUCOSE OTHER FLUID: CPT

## 2020-03-09 PROCEDURE — 85025 COMPLETE CBC W/AUTO DIFF WBC: CPT

## 2020-03-09 PROCEDURE — 2709999900 HC NON-CHARGEABLE SUPPLY

## 2020-03-09 PROCEDURE — 82330 ASSAY OF CALCIUM: CPT

## 2020-03-09 PROCEDURE — 93312 ECHO TRANSESOPHAGEAL: CPT

## 2020-03-09 PROCEDURE — B01B1ZZ FLUOROSCOPY OF SPINAL CORD USING LOW OSMOLAR CONTRAST: ICD-10-PCS | Performed by: INTERNAL MEDICINE

## 2020-03-09 PROCEDURE — 84157 ASSAY OF PROTEIN OTHER: CPT

## 2020-03-09 PROCEDURE — 51702 INSERT TEMP BLADDER CATH: CPT

## 2020-03-09 PROCEDURE — 71045 X-RAY EXAM CHEST 1 VIEW: CPT

## 2020-03-09 PROCEDURE — 2000000000 HC ICU R&B

## 2020-03-09 PROCEDURE — 86592 SYPHILIS TEST NON-TREP QUAL: CPT

## 2020-03-09 PROCEDURE — 87205 SMEAR GRAM STAIN: CPT

## 2020-03-09 PROCEDURE — 93010 ELECTROCARDIOGRAM REPORT: CPT | Performed by: INTERNAL MEDICINE

## 2020-03-09 PROCEDURE — 87073 CULTURE BACTERIA ANAEROBIC: CPT

## 2020-03-09 PROCEDURE — 87529 HSV DNA AMP PROBE: CPT

## 2020-03-09 PROCEDURE — B2111ZZ FLUOROSCOPY OF MULTIPLE CORONARY ARTERIES USING LOW OSMOLAR CONTRAST: ICD-10-PCS | Performed by: INTERNAL MEDICINE

## 2020-03-09 PROCEDURE — 82140 ASSAY OF AMMONIA: CPT

## 2020-03-09 PROCEDURE — 94761 N-INVAS EAR/PLS OXIMETRY MLT: CPT

## 2020-03-09 PROCEDURE — C1894 INTRO/SHEATH, NON-LASER: HCPCS

## 2020-03-09 PROCEDURE — 82962 GLUCOSE BLOOD TEST: CPT

## 2020-03-09 PROCEDURE — 93306 TTE W/DOPPLER COMPLETE: CPT

## 2020-03-09 PROCEDURE — 2500000003 HC RX 250 WO HCPCS: Performed by: INTERNAL MEDICINE

## 2020-03-09 PROCEDURE — 80053 COMPREHEN METABOLIC PANEL: CPT

## 2020-03-09 PROCEDURE — 2720000010 HC SURG SUPPLY STERILE

## 2020-03-09 PROCEDURE — 93458 L HRT ARTERY/VENTRICLE ANGIO: CPT

## 2020-03-09 RX ORDER — SODIUM CHLORIDE 9 MG/ML
INJECTION, SOLUTION INTRAVENOUS
Status: COMPLETED
Start: 2020-03-09 | End: 2020-03-10

## 2020-03-09 RX ORDER — SODIUM CHLORIDE 0.9 % (FLUSH) 0.9 %
10 SYRINGE (ML) INJECTION EVERY 12 HOURS SCHEDULED
Status: DISCONTINUED | OUTPATIENT
Start: 2020-03-09 | End: 2020-03-24 | Stop reason: HOSPADM

## 2020-03-09 RX ORDER — SODIUM CHLORIDE 9 MG/ML
INJECTION, SOLUTION INTRAVENOUS CONTINUOUS
Status: DISCONTINUED | OUTPATIENT
Start: 2020-03-09 | End: 2020-03-09

## 2020-03-09 RX ORDER — ACETAMINOPHEN 325 MG/1
650 TABLET ORAL EVERY 4 HOURS PRN
Status: DISCONTINUED | OUTPATIENT
Start: 2020-03-09 | End: 2020-03-24 | Stop reason: HOSPADM

## 2020-03-09 RX ORDER — SODIUM CHLORIDE 9 MG/ML
INJECTION, SOLUTION INTRAVENOUS CONTINUOUS
Status: DISCONTINUED | OUTPATIENT
Start: 2020-03-09 | End: 2020-03-11 | Stop reason: ALTCHOICE

## 2020-03-09 RX ORDER — 0.9 % SODIUM CHLORIDE 0.9 %
250 INTRAVENOUS SOLUTION INTRAVENOUS ONCE
Status: COMPLETED | OUTPATIENT
Start: 2020-03-10 | End: 2020-03-10

## 2020-03-09 RX ORDER — ATROPINE SULFATE 0.4 MG/ML
0.5 AMPUL (ML) INJECTION
Status: ACTIVE | OUTPATIENT
Start: 2020-03-09 | End: 2020-03-09

## 2020-03-09 RX ORDER — SODIUM CHLORIDE 0.9 % (FLUSH) 0.9 %
10 SYRINGE (ML) INJECTION PRN
Status: DISCONTINUED | OUTPATIENT
Start: 2020-03-09 | End: 2020-03-24 | Stop reason: HOSPADM

## 2020-03-09 RX ADMIN — CHLORHEXIDINE GLUCONATE 0.12% ORAL RINSE 15 ML: 1.2 LIQUID ORAL at 09:26

## 2020-03-09 RX ADMIN — CHLORHEXIDINE GLUCONATE 0.12% ORAL RINSE 15 ML: 1.2 LIQUID ORAL at 20:36

## 2020-03-09 RX ADMIN — ALBUTEROL SULFATE 4 PUFF: 90 AEROSOL, METERED RESPIRATORY (INHALATION) at 12:08

## 2020-03-09 RX ADMIN — CEFTRIAXONE SODIUM 2 G: 2 INJECTION, POWDER, FOR SOLUTION INTRAMUSCULAR; INTRAVENOUS at 20:30

## 2020-03-09 RX ADMIN — Medication 250 ML: at 23:46

## 2020-03-09 RX ADMIN — PIPERACILLIN AND TAZOBACTAM 3.38 G: 3; .375 INJECTION, POWDER, LYOPHILIZED, FOR SOLUTION INTRAVENOUS at 04:47

## 2020-03-09 RX ADMIN — PANTOPRAZOLE SODIUM 40 MG: 40 INJECTION, POWDER, FOR SOLUTION INTRAVENOUS at 09:26

## 2020-03-09 RX ADMIN — DOPAMINE HYDROCHLORIDE 5 MCG/KG/MIN: 160 INJECTION, SOLUTION INTRAVENOUS at 13:15

## 2020-03-09 RX ADMIN — PIPERACILLIN AND TAZOBACTAM 3.38 G: 3; .375 INJECTION, POWDER, LYOPHILIZED, FOR SOLUTION INTRAVENOUS at 13:15

## 2020-03-09 RX ADMIN — CALCIUM GLUCONATE 1 G: 98 INJECTION, SOLUTION INTRAVENOUS at 14:24

## 2020-03-09 RX ADMIN — ACETAMINOPHEN 650 MG: 650 SUPPOSITORY RECTAL at 19:51

## 2020-03-09 RX ADMIN — SODIUM CHLORIDE 250 ML: 9 INJECTION, SOLUTION INTRAVENOUS at 23:46

## 2020-03-09 RX ADMIN — MIDAZOLAM HYDROCHLORIDE 8 MG/HR: 5 INJECTION, SOLUTION INTRAMUSCULAR; INTRAVENOUS at 10:10

## 2020-03-09 RX ADMIN — CALCIUM GLUCONATE 1 G: 98 INJECTION, SOLUTION INTRAVENOUS at 23:10

## 2020-03-09 RX ADMIN — VANCOMYCIN HYDROCHLORIDE 1250 MG: 5 INJECTION, POWDER, LYOPHILIZED, FOR SOLUTION INTRAVENOUS at 04:47

## 2020-03-09 RX ADMIN — SODIUM CHLORIDE, PRESERVATIVE FREE 10 ML: 5 INJECTION INTRAVENOUS at 09:27

## 2020-03-09 RX ADMIN — Medication 2 PUFF: at 12:08

## 2020-03-09 RX ADMIN — ACYCLOVIR SODIUM 1000 MG: 50 INJECTION, SOLUTION INTRAVENOUS at 20:36

## 2020-03-09 RX ADMIN — AMPICILLIN SODIUM AND SULBACTAM SODIUM 1.5 G: 1; .5 INJECTION, POWDER, FOR SOLUTION INTRAMUSCULAR; INTRAVENOUS at 18:48

## 2020-03-09 RX ADMIN — Medication 2 PUFF: at 07:48

## 2020-03-09 RX ADMIN — SODIUM CHLORIDE, PRESERVATIVE FREE 10 ML: 5 INJECTION INTRAVENOUS at 20:51

## 2020-03-09 RX ADMIN — ALBUTEROL SULFATE 4 PUFF: 90 AEROSOL, METERED RESPIRATORY (INHALATION) at 07:48

## 2020-03-09 RX ADMIN — ENOXAPARIN SODIUM 100 MG: 100 INJECTION SUBCUTANEOUS at 09:26

## 2020-03-09 RX ADMIN — CALCIUM GLUCONATE 1 G: 98 INJECTION, SOLUTION INTRAVENOUS at 03:52

## 2020-03-09 RX ADMIN — ALBUTEROL SULFATE 4 PUFF: 90 AEROSOL, METERED RESPIRATORY (INHALATION) at 19:58

## 2020-03-09 RX ADMIN — SODIUM CHLORIDE: 9 INJECTION, SOLUTION INTRAVENOUS at 09:38

## 2020-03-09 RX ADMIN — ACYCLOVIR SODIUM 1000 MG: 50 INJECTION, SOLUTION INTRAVENOUS at 13:15

## 2020-03-09 RX ADMIN — Medication 2 PUFF: at 19:59

## 2020-03-09 RX ADMIN — MIDAZOLAM HYDROCHLORIDE 8 MG/HR: 5 INJECTION, SOLUTION INTRAMUSCULAR; INTRAVENOUS at 22:39

## 2020-03-09 RX ADMIN — ALBUTEROL SULFATE 4 PUFF: 90 AEROSOL, METERED RESPIRATORY (INHALATION) at 16:20

## 2020-03-09 RX ADMIN — Medication 2 PUFF: at 16:20

## 2020-03-09 RX ADMIN — Medication 100 MCG/HR: at 04:32

## 2020-03-09 RX ADMIN — Medication 100 MCG/HR: at 16:59

## 2020-03-09 RX ADMIN — Medication 10 ML: at 09:27

## 2020-03-09 ASSESSMENT — PULMONARY FUNCTION TESTS
PIF_VALUE: 28
PIF_VALUE: 29
PIF_VALUE: 29
PIF_VALUE: 19
PIF_VALUE: 29
PIF_VALUE: 31
PIF_VALUE: 28
PIF_VALUE: 28
PIF_VALUE: 30
PIF_VALUE: 47
PIF_VALUE: 27
PIF_VALUE: 31

## 2020-03-09 NOTE — PROGRESS NOTES
Pulmonary and Critical Care  Progress Note    Subjective: The patient has improved  Shortness of breath none  Chest pain none  Addressing respiratory complaints Patient is negative for  hemoptysis and cyanosis  CONSTITUTIONAL:  negative for fevers and chills      Past Medical History:     has a past medical history of Anxiety. has no past surgical history on file. reports that he has never smoked. His smokeless tobacco use includes snuff. He reports current alcohol use. He reports that he does not use drugs. Family history:  family history includes Cancer in his father; Coronary Art Dis in his mother; Diabetes in an other family member. No Known Allergies  Social History:    Reviewed; no changes    Objective:   PHYSICAL EXAM:        VITALS:  BP (!) 107/45   Pulse 77   Temp 98.6 °F (37 °C) (Rectal)   Resp 18   Ht 5' 10\" (1.778 m)   Wt 220 lb 14.4 oz (100.2 kg)   SpO2 96%   BMI 31.70 kg/m²     24HR INTAKE/OUTPUT:      Intake/Output Summary (Last 24 hours) at 3/9/2020 1126  Last data filed at 3/9/2020 0525  Gross per 24 hour   Intake 3225 ml   Output 1830 ml   Net 1395 ml       CONSTITUTIONAL:  unarousable  LUNGS:  decreased breath sounds, occ basilar crackles. CARDIOVASCULAR:  normal S1 and S2 and negative JVD  ABD:Abdomen soft, non-tender. BS normal. No masses,  No organomegaly  NEURO:Sedated on vent.   DATA:    CBC:  Recent Labs     03/08/20  0345 03/08/20  0843 03/09/20  0315   WBC 19.4* 12.8* 5.9   RBC 4.07* 3.45* 4.34*   HGB 12.1* 10.3* 12.8*   HCT 42.1 33.1* 40.4*    221 247   .4* 95.9 93.1   MCH 29.7 29.9 29.5   MCHC 28.7* 31.1* 31.7*   RDW 14.6 14.7 14.7   SEGSPCT 66.8* 77.0* 60.6   BANDSPCT  --  7  --       BMP:  Recent Labs     03/08/20  0843 03/08/20  1305 03/08/20  2110 03/09/20  0315    134* 138 139   K 4.1 3.9 3.6 3.7    100 100 101   CO2 19* 18* 22 21   BUN 19 23  --  29*   CREATININE 1.7* 1.9*  --  2.0*   CALCIUM 8.0* 8.3  --  7.9*   GLUCOSE 90 118*  --  104* ABG:  Recent Labs     03/08/20  1000 03/08/20  2345 03/09/20  0700   PH 7.36 7.48* 7.44   PO2ART 57* 61* 63*   JGU6LWI 30.0* 31.0* 35.0   O2SAT 89.1* 92.3* 92.2*     No results found for: PROBNP, THEOPH  No results found for: [facility]    Radiology Review:  Pertinent images / reports were reviewed as a part of this visit. Assessment:     Patient Active Problem List   Diagnosis    Anxiety    Sepsis ([address]       Plan:   1. Overall the patient has improved  2. Wean peep. 3. Reduce A/C to 18.     Kathrin Noonan MD  3/9/2020  11:26 AM

## 2020-03-09 NOTE — OP NOTE
[facility] --[id number]  LEFT MAIN PATENT  LAD/RAMUS/LCX AND RCA MILD DX  LVEDP 25-30  NON OBSTRUCTIVE CAD

## 2020-03-09 NOTE — PROGRESS NOTES
Pt ordered for MRI, Dr. Claudette Goodnight requesting to be completed as soon as able. Pt is still on mechanical ventilator at this time, no feasible plans to extubate at this time. Discussed with Pedro Luis BARROW about transport, at current there are no MRI transport compatible ventilators as they are currently being worked on by [employer]. Attempted to reach [email or web address] department to discuss ETA on equipment being fixed but unable to reach at this time. Violetta Cordero RN ICU manager updated on situation and reaching out to appropriate parties at this time.

## 2020-03-09 NOTE — PROGRESS NOTES
Discussed with renal  His ekg has changes concerned  Will plan for cath and temp pacer today  Will discuss with wife  Moderate risk for CHELLE   Increase IVF

## 2020-03-09 NOTE — CONSULTS
Neurology Service Consult Note  [facility]    Patient Name: Uriel Grimm  : 1965        Subjective:   Reason for consult: AMS, possible seizure  62 y.o. - male with history of Anxiety presenting to after being found down. He apparently had drank a few beers and was normal at 11 pm last night. Around 1 am wife heard a thud and found patient on the floor unresponsive. She called EMS and when they arrived patient was confused and agitated. He received Ketamine for combativeness and was subsequently intubated. In the ED he was found to have lactic acidosis (19), elevated ammonia (470 but repeat was normal ), leukocytosis (19.4), and elevated blood glucose (343). Family denies any history of DM or liver disease. They state patient drinks a couple of beers daily. He apparently has been complaining he has not felt well over the last couple of days. He was febrile yesterday with temperature as high as 102. Past Medical History:   Diagnosis Date    Anxiety     :   No past surgical history on file.   Medications:  Scheduled Meds:   sodium chloride flush  10 mL Intravenous BID    sodium chloride flush  10 mL Intravenous 2 times per day    piperacillin-tazobactam  3.375 g Intravenous Q8H    pantoprazole  40 mg Intravenous Daily    insulin lispro  0-12 Units Subcutaneous Q6H    chlorhexidine  15 mL Mouth/Throat BID    vancomycin  1,250 mg Intravenous Q12H    aspirin  81 mg Oral Daily    enoxaparin  1 mg/kg Subcutaneous Daily    albuterol sulfate HFA  4 puff Inhalation Q4H WA    ipratropium  2 puff Inhalation Q4H WA     Continuous Infusions:   sodium chloride 50 mL/hr at 20 0938    dextrose      DOPamine 5 mcg/kg/min (20)    midazolam 8 mg/hr (20 1010)    fentanyl 100 mcg/hr (20 0432)     PRN Meds:.glucose, dextrose, glucagon (rDNA), dextrose, sodium chloride flush, acetaminophen **OR** acetaminophen, calcium gluconate IVPB **OR** calcium gluconate IVPB **OR** calcium gluconate IVPB **OR** calcium gluconate IVPB, sodium phosphate IVPB **OR** sodium phosphate IVPB    No Known Allergies  Social History     Socioeconomic History    Marital status: Single     Spouse name: Not on file    Number of children: Not on file    Years of education: Not on file    Highest education level: Not on file   Occupational History    Not on file   Social Needs    Financial resource strain: Not on file    Food insecurity     Worry: Not on file     Inability: Not on file    Transportation needs     Medical: Not on file     Non-medical: Not on file   Tobacco Use    Smoking status: Never Smoker    Smokeless tobacco: Current User     Types: Snuff   Substance and Sexual Activity    Alcohol use: Yes     Comment: DAILY    Drug use: Never    Sexual activity: Not on file   Lifestyle    Physical activity     Days per week: Not on file     Minutes per session: Not on file    Stress: Not on file   Relationships    Social connections     Talks on phone: Not on file     Gets together: Not on file     Attends Holiness service: Not on file     Active member of club or organization: Not on file     Attends meetings of clubs or organizations: Not on file     Relationship status: Not on file    Intimate partner violence     Fear of current or ex partner: Not on file     Emotionally abused: Not on file     Physically abused: Not on file     Forced sexual activity: Not on file   Other Topics Concern    Not on file   Social History Narrative    Not on file      Family History   Problem Relation Age of Onset    Coronary Art Dis Mother     Cancer Father         tongue    Diabetes Other         aunt       Review of Symptoms:    10-point system review unable to be completed due to intubated and sedated. Physical Exam:           Gen: intubated and sedated  HEENT: NC/AT, EOMI, PERRL, mmm, no carotid bruits, neck supple, no meningeal signs;    Heart: RRR, S1S2  Lungs: CTAB, mechanically

## 2020-03-09 NOTE — PROGRESS NOTES
[phone], this patient is having a temporary pacer placement and a heart cath. Dr. Gigi Trinidad has stated no LP at this time and to cancel LP for now. Message has been left with Caitlin Edwards NP. Please reorder when the patient is able to have an LP.

## 2020-03-09 NOTE — PROCEDURES
[address] [place] [address], [address] [place] [address]                            CARDIAC CATHETERIZATION    PATIENT NAME: Chris Wells                       :        1965  MED REC NO:   [id number]                          ROOM:       2110  ACCOUNT NO:   [de-identified]                           ADMIT DATE: 2020  PROVIDER:     Jos Narayan MD    DATE OF PROCEDURE:  2020    INDICATION:  Non-STEMI, heart failure. DESCRIPTION OF PROCEDURE:  This is a 57-year-old male patient, was  brought to cath lab today. Informed consent was obtained from the  family as the patient was on vent and sedated. The patient was injected with 15 mL of 2% lidocaine in the left groin. A 4-Canadian sheath was placed in the left groin. A pigtail catheter was used. EDP was measured. EDP was around between  25 and 30 mmHg present. On the pullback, there was no gradient present across the aortic valve. Then, using a JL5, 4-Canadian catheter, left coronary angiogram was  performed. Left coronary angiogram revealed the left main is patent. It trifurcates in the LAD, circ, and ramus branch. Circ is a  medium-sized vessel, gives off a large OM branch. There is mild disease  noted. The ramus is medium-sized vessel, has mild disease noted. LAD is a medium-sized vessel. It reaches and wraps the apex. It gives  off a medium diagonal branch. There is mild disease present. Right coronary artery is a large-sized vessel, dominant vessel. It  gives off the PD and PL branch. There is mild disease present. IMPRESSION:  1. EDP is around between 25 and 30 mmHg present. 2.  Left main is patent. 3.  LAD, circ, ramus, and RCA all have mild coronary artery disease  present. The patient tolerated the procedure well. No complications noted.     Blood loss Misha Fernandez MD    D: 2020 15:35:12       T: 2020 15:56:22

## 2020-03-09 NOTE — PROGRESS NOTES
[address] [place] [address]  HOSPITALIST PROGRESS NOTE                       Name:  Duane Page /Age/Sex: 1965  (62 y.o. male)   MRN & CSN:  [id number] & [id number] Admission Date/Time: 3/8/2020  3:44 AM   Location:  -A Attending:  Anastacio Paul MD                                                  HPI  Duane Page is a 62 y.o. male who presents with acute encephalopathy-intubated in the field    SUBJECTIVE  - remains sedated, on the vent. On dopamine drip for concerns of heart block, with fevers too overnight requiring cooling blanket    ROS-unable to complete due to sedation      ALLERGIES: No Known Allergies    PCP: Reina Vanessa PA-C    PAST MEDICAL HISTORY, SURGICAL HISTORY, SOCIAL HISTORY and  HOME MEDICATIONS all reviewed. OBJECTIVE  Vitals:    20 0702 20 0717 20 0755 20 0801   BP: (!) 112/43 (!) 109/44 (!) 112/47    Pulse: (!) 45 (!) 44 (!) 44 (!) 44   Resp:    Temp:   98.6 °F (37 °C)    TempSrc:   Rectal    SpO2:   94% 97%   Weight:       Height:           PHYSICAL EXAM   GEN sedated male, in NAD  EYES Pupils are equally round. No scleral erythema, discharge, or conjunctivitis. HENT Mucous membranes are dry. Oral pharynx without exudates, no evidence of thrush. NECK Supple, no apparent thyromegaly or masses. RESP Unlabored respiration, CTA bilaterally  CARDIO/VASC S1/S2 auscultated. Regular rate without appreciable murmurs, rubs, or gallops. No JVD or carotid bruits. Peripheral pulses equal bilaterally and palpable. No peripheral edema. GI Abdomen is soft, exam limited by sedation. HEME/LYMPH No palpable cervical lymphadenopathy and no hepatosplenomegaly. No petechiae or ecchymoses. MSK Spontaneous movement of all extremities. No gross joint deformities. SKIN Normal coloration, warm, dry.   NEURO sedated    INTAKE: In: 4172 [I.V.:3175]  Out: 3183   OUTPUT: In: 3225   Out: 1830 [Urine:1600]    LABS  Recent Labs     20  0345 20  6101 03/09/20  0315   WBC 19.4* 12.8* 5.9   HGB 12.1* 10.3* 12.8*   HCT 42.1 33.1* 40.4*    221 247      Recent Labs     03/08/20  0843 03/08/20  1305 03/08/20 2110 03/09/20  0315    134* 138 139   K 4.1 3.9 3.6 3.7    100 100 101   CO2 19* 18* 22 21   PHOS  --  5.0*  --  6.0*   BUN 19 23  --  29*   CREATININE 1.7* 1.9*  --  2.0*     Recent Labs     03/08/20  0345 03/09/20  0315   AST 28 52*   ALT 25 29   BILITOT 1.2* 0.8   ALKPHOS 121 114     Recent Labs     03/08/20  0700   INR 1.26     Recent Labs     03/08/20  0345 03/08/20  0843 03/08/20 2110 03/09/20  0315   CKTOTAL  --  909*  --  2,469*   TROPONINT 0.020* 0.134* 0.054*  --           Abnormal labs for today noted      Imaging:     ECHO:    Microbiology:  Blood culture:    Urine culture:    Sputum culture:    Procedures done this admission:    MEDS  Scheduled Meds:   sodium chloride flush  10 mL Intravenous BID    sodium chloride flush  10 mL Intravenous 2 times per day    piperacillin-tazobactam  3.375 g Intravenous Q8H    pantoprazole  40 mg Intravenous Daily    insulin lispro  0-12 Units Subcutaneous Q6H    chlorhexidine  15 mL Mouth/Throat BID    vancomycin  1,250 mg Intravenous Q12H    aspirin  81 mg Oral Daily    enoxaparin  1 mg/kg Subcutaneous Daily    albuterol sulfate HFA  4 puff Inhalation Q4H WA    ipratropium  2 puff Inhalation Q4H WA     Continuous Infusions:   sodium chloride      dextrose      DOPamine 5 mcg/kg/min (03/08/20 2031)    midazolam 8 mg/hr (03/08/20 1932)    fentanyl 100 mcg/hr (03/09/20 0432)     PRN Meds:glucose, dextrose, glucagon (rDNA), dextrose, sodium chloride flush, acetaminophen **OR** acetaminophen, calcium gluconate IVPB **OR** calcium gluconate IVPB **OR** calcium gluconate IVPB **OR** calcium gluconate IVPB, sodium phosphate IVPB **OR** sodium phosphate IVPB        ASSESSMENT and PLAN  Hospital Day: 2    1-Probable severe sepsis- CT shows uncomplicated diverticulitis which somewhat out of proportion to severity of sepsis- blood culture pending broad spectrum abx for now  2-Severe metabolic acidosis with severe hyperammonemia- both quickly resolved- raising suspicion of either acute neurologic event like seizure or cardiac that may have precipitated this. Tox screen positive for amphetamines though family denies him using this. Received ketamine in the field too. Consulted neuro for concern of seizure. With JUSTIN too- on IVF  3-Acute respiratory failure due to severe metabolic acidosis- CT chest non-acute, on the vent. pulm consulted. 4-Acute metabolic encephalopathy due to above, ammonia level was 470- normal LFTs- CT with no cirrhosis- repeat ammonia normalized, CT head non-acute- as above neuro consulted. Consider MRI once more stable  5-Hyperglycemia-not diabetic as  hemoglobin A1C is normal  6-Elevated trop with arrythmia/heart block- on dopamine drip, cardio following  7-? Alcoholism, CIWA once extubated.       -family reports he is not on any prescription medications, no known chronic medical issues.           Disp:     Diet Diet NPO Effective Now   DVT Prophylaxis [x] Lovenox, []  Heparin, [] SCDs, [] Ambulation   GI Prophylaxis [] PPI,  [] H2 Blocker,  [] Carafate,  [] Diet/Tube Feeds   Code Status Full Code   Disposition Patient requires continued admission due to probable sepsis/metabolic acidosis   CMS Level of Risk [] Low, [] Moderate,[x]  High  Patient's risk as above due to severe sepsis/metabolic acidosis     JOYA NGUYEN MD 3/9/2020 9:12 AM

## 2020-03-09 NOTE — CONSULTS
Department of Cardiovascular & Thoracic Surgery   Consult Note    Reason for Consult:    aortic regurg      Requesting Physician: Dr Piotr Lisa    Date of Consult: 3/9/20      History Obtained From:    Patient's wife   EMR     HISTORY OF PRESENT ILLNESS:    The patient is a 62 y.o. male who was brought  to the ER by EMS confused and hypoxic. He has been tired short of breath for several weeks till this event  He has been on the vent, sedated and underwent TTE and TTE . Following the above , consult requested per cardiology        Past Medical History:        Diagnosis Date    Anxiety      Past Surgical History:    No past surgical history on file.   Current Medications:   Current Facility-Administered Medications: 0.9 % sodium chloride infusion, , Intravenous, Continuous  acyclovir (ZOVIRAX) 1,000 mg in dextrose 5 % 250 mL IVPB, 10 mg/kg, Intravenous, Q8H  sodium chloride flush 0.9 % injection 10 mL, 10 mL, Intravenous, BID  glucose (GLUTOSE) 40 % oral gel 15 g, 15 g, Oral, PRN  dextrose 50 % IV solution, 12.5 g, Intravenous, PRN  glucagon (rDNA) injection 1 mg, 1 mg, Intramuscular, PRN  dextrose 5 % solution, 100 mL/hr, Intravenous, PRN  sodium chloride flush 0.9 % injection 10 mL, 10 mL, Intravenous, 2 times per day  sodium chloride flush 0.9 % injection 10 mL, 10 mL, Intravenous, PRN  acetaminophen (TYLENOL) tablet 650 mg, 650 mg, Oral, Q6H PRN **OR** acetaminophen (TYLENOL) suppository 650 mg, 650 mg, Rectal, Q6H PRN  piperacillin-tazobactam (ZOSYN) 3.375 g in dextrose 5 % 50 mL IVPB extended infusion (mini-bag), 3.375 g, Intravenous, Q8H  pantoprazole (PROTONIX) injection 40 mg, 40 mg, Intravenous, Daily  insulin lispro (HUMALOG) injection vial 0-12 Units, 0-12 Units, Subcutaneous, Q6H  chlorhexidine (PERIDEX) 0.12 % solution 15 mL, 15 mL, Mouth/Throat, BID  DOPamine (INTROPIN) 400 mg in dextrose 5 % 250 mL infusion, 5 mcg/kg/min, Intravenous, Continuous  midazolam (VERSED) 100 mg in dextrose 5 % 100 mL infusion, 1 mg/hr, Intravenous, Continuous  vancomycin (VANCOCIN) 1,250 mg in dextrose 5 % 250 mL IVPB, 1,250 mg, Intravenous, Q12H  calcium gluconate 1 g in dextrose 5 % 100 mL IVPB, 1 g, Intravenous, PRN **OR** calcium gluconate 2 g in dextrose 5 % 100 mL IVPB, 2 g, Intravenous, PRN **OR** calcium gluconate 3 g in dextrose 5 % 100 mL IVPB, 3 g, Intravenous, PRN **OR** calcium gluconate 4 g in dextrose 5 % 100 mL IVPB, 4 g, Intravenous, PRN  sodium phosphate 16.02 mmol in dextrose 5 % 250 mL IVPB, 0.16 mmol/kg, Intravenous, PRN **OR** sodium phosphate 32.07 mmol in dextrose 5 % 250 mL IVPB, 0.32 mmol/kg, Intravenous, PRN  aspirin chewable tablet 81 mg, 81 mg, Oral, Daily  enoxaparin (LOVENOX) injection 100 mg, 1 mg/kg, Subcutaneous, Daily  fentanyl (SUBLIMAZE) 1,000 mcg in sodium chloride 0.9% 100 mL infusion, 25 mcg/hr, Intravenous, Continuous  albuterol sulfate  (90 Base) MCG/ACT inhaler 4 puff, 4 puff, Inhalation, Q4H WA  ipratropium (ATROVENT HFA) 17 MCG/ACT inhaler 2 puff, 2 puff, Inhalation, Q4H WA  Allergies:  No Known Allergies    Social History:   Social History     Socioeconomic History    Marital status: Single     Spouse name: Not on file    Number of children: Not on file    Years of education: Not on file    Highest education level: Not on file   Occupational History    Not on file   Social Needs    Financial resource strain: Not on file    Food insecurity     Worry: Not on file     Inability: Not on file   [employer] needs     Medical: Not on file     Non-medical: Not on file   Tobacco Use    Smoking status: Never Smoker    Smokeless tobacco: Current User     Types: Snuff   Substance and Sexual Activity    Alcohol use: Yes     Comment: DAILY    Drug use: Never    Sexual activity: Not on file   Lifestyle    Physical activity     Days per week: Not on file     Minutes per session: Not on file    Stress: Not on file   Relationships    Social connections     Talks on phone: ECHO   CXR  Labs   sevre AI  prob torn aortic leaflet  LVH   mR, mild  Acte renal failure  with elevated BUN/ Creat    IMPRESSION     CHF   aortic regurg  Vent dependent    sedated status  Renal failure  Patient Active Problem List   Diagnosis    Anxiety    Sepsis ([address]         RECOMMENDATIONS:    Per review and d./w cardiology and Pt's wife, will follow along and determine the timing for AVR  He needs cardiac cath pending resolution of renal failure and vent status

## 2020-03-09 NOTE — CONSULTS
Nutrition Assessment    Type and Reason for Visit: Initial, Consult    Nutrition Recommendations:   · Continue NPO at this time    Nutrition Assessment: Pt is intubated and RD consulted for EN order and manage. Pt is currently hemodynamically unstable and it is not indicated to feed the pt at this time. Will follow up tomorrow for the ability to start EN. Malnutrition Assessment:  · Malnutrition Status: At risk for malnutrition  · Context: Acute illness or injury  · Findings of the 6 clinical characteristics of malnutrition (Minimum of 2 out of 6 clinical characteristics is required to make the diagnosis of moderate or severe Protein Calorie Malnutrition based on AND/ASPEN Guidelines):  1. Energy Intake-Less than or equal to 50% of estimated energy requirement, (1 day)    2. Weight Loss-No significant weight loss, in 6 months  3. Fat Loss-No significant subcutaneous fat loss, Orbital  4. Muscle Loss-No significant muscle mass loss, Clavicles (pectoralis and deltoids)  5. Fluid Accumulation-Mild fluid accumulation, Generalized  6.   Strength-Not measured    Nutrition Risk Level: High    Nutrient Needs:  · Estimated Daily Total Kcal: 3134 based on britt state 2003b  · Estimated Daily Protein (g): 133-150 based on 1.5-2 g/kg/IBW  · Estimated Daily Total Fluid (ml/day): 2357 based on 1 mL/kcal    Nutrition Diagnosis:   · Problem: Inadequate oral intake  · Etiology: related to Impaired respiratory function-inability to consume food     Signs and symptoms:  as evidenced by NPO status due to medical condition, Intubation    Objective Information:  · Wound Type: None  · Current Nutrition Therapies:  · Oral Diet Orders: NPO   · Anthropometric Measures:  · Ht: 5' 10\" (177.8 cm)   · Current Body Wt: 220 lb (99.8 kg)  · Admission Body Wt: 220 lb (99.8 kg)  · Usual Body Wt: 211 lb (95.7 kg)  · % Weight Change: none noted  · Ideal Body Wt: 166 lb (75.3 kg), % Ideal Body 133%  · BMI Classification: BMI 30.0 - 34.9 Obese Class I    Nutrition Interventions:   Continue NPO  Continued Inpatient Monitoring, Education not appropriate at this time, Coordination of Care    Nutrition Evaluation:   · Evaluation: Goals set   · Goals: pt will have a source of nutrition started by los day 7    · Monitoring: Nutrition Progression, Weight, Pertinent Labs, Monitor Hemodynamic Status      Electronically signed by Franchesca Grubbs RD, LD on 5/2/97 at 1:03 PM EDT    Contact Number: [phone]

## 2020-03-09 NOTE — PROGRESS NOTES
BMP:    Recent Labs     03/08/20  0843 03/08/20  1305 03/08/20  2110 03/09/20  0315    134* 138 139   K 4.1 3.9 3.6 3.7    100 100 101   CO2 19* 18* 22 21   BUN 19 23  --  29*   CREATININE 1.7* 1.9*  --  2.0*   GLUCOSE 90 118*  --  104*     Hepatic:   Recent Labs     03/08/20  0345 03/09/20  0315   AST 28 52*   ALT 25 29   BILITOT 1.2* 0.8   ALKPHOS 121 114     Troponin: No results for input(s): TROPONINI in the last 72 hours. BNP: No results for input(s): BNP in the last 72 hours. Lipids: No results for input(s): CHOL, HDL in the last 72 hours.     Invalid input(s): LDLCALCU  ABGs:   Lab Results   Component Value Date    PO2ART 63 03/09/2020    PWN8MZE 35.0 03/09/2020     INR:   Recent Labs     03/08/20  0700   INR 1.26       Objective:   Vitals: BP (!) 112/47   Pulse (!) 44   Temp 98.6 °F (37 °C) (Rectal)   Resp 20   Ht 5' 10\" (1.778 m)   Wt 220 lb 14.4 oz (100.2 kg)   SpO2 97%   BMI 31.70 kg/m²   General appearance: , in no acute distress  HEENT: normocephalic, atraumatic,   Neck: supple, trachea midline  Lungs:  breathing comfortably on mv  Heart[de-identified] bradycardic  Abdomen: soft, non-tender; non distended,  Extremities: extremities atraumatic, no cyanosis or edema  Neurologic: sedated    Assessment and Plan:     - ANIOn gap metabolic acidosis:  Bicarb level of 9 on admission //Serum acetaminophen and salicylate, ethanol and BHA were negative.//likely from lactic acidosis//nolrmal osmolal gap of -7  - JUSTIN from ATN; no HN on CT A/P  - Acute hypoxic resp failure  - Polysusbstance abuse: amphetamines positive as of now  - Hyperglycemia  - Leukocytosis/fevers  - Hyperammonemia  - bradycardia     Plan;  Suggest:  - stop bicarb gtt  - continue supportive mgmt  - please avoid nephrotoxins  - critically ill                     Electronically signed by Li Womack DO on 3/9/2020 at 8:05 AM    [address]EEK Luong DO  [address]

## 2020-03-09 NOTE — CONSULTS
[address] [facility]  consulted by Dr. Ian Ordoñez for monitoring and adjustment. Indication for treatment: Severe sepsis  Goal trough: 15-20 mcg/mL  Other antimicrobials:  Zosyn     Pertinent Laboratory Values:   Temp Readings from Last 3 Encounters:   03/09/20 98.6 °F (37 °C) (Rectal)   01/07/20 97.7 °F (36.5 °C)     Recent Labs     03/08/20  0345 03/08/20  0700 03/08/20  0843 03/09/20  0315 03/09/20  0600   WBC 19.4*  --  12.8* 5.9  --    LACTATE 19.7  LACTIC CALLED TO [facility] ED [facility]   RESULTS READ BACK  * 3.0*  --   --  1.8     Recent Labs     03/08/20  0843 03/08/20  1305 03/09/20  0315   BUN 19 23 29*   CREATININE 1.7* 1.9* 2.0*     Estimated Creatinine Clearance: 49 mL/min (A) (based on SCr of 2 mg/dL (H)). Intake/Output Summary (Last 24 hours) at 3/9/2020 1710  Last data filed at 3/9/2020 1200  Gross per 24 hour   Intake 1449 ml   Output 1850 ml   Net -401 ml       Pertinent Cultures:  Date    Source    Results  3/8   Blood    NGTD  3/8   Respiratory   Pending  3/8   Respiratory PCR  Negative   3/8   CSF         Pending       Vancomycin level:   TROUGH:  No results for input(s): VANCOTROUGH in the last 72 hours. RANDOM:  No results for input(s): VANCORANDOM in the last 72 hours. Assessment:  · WBC and temperature: WBC improved, WNL; Tmax = 100.8F.  · SCr, BUN, and urine output:  · JUSTIN, Scr increase 1.5 -> 2  · Day(s) of therapy: #2  · Vancomycin level: To be collected    Plan:  · Based on renal trends, modify from scheduled to intermittent dosing. · Plan to check a random level tomorrow AM and re-dose as appropriate.   · Pharmacy will continue to monitor patient and adjust therapy as indicated    Thank you for the consult,    Lucie Vance, PharmD, [facility]

## 2020-03-10 ENCOUNTER — APPOINTMENT (OUTPATIENT)
Dept: MRI IMAGING | Age: 55
DRG: 853 | End: 2020-03-10
Payer: COMMERCIAL

## 2020-03-10 ENCOUNTER — APPOINTMENT (OUTPATIENT)
Dept: GENERAL RADIOLOGY | Age: 55
DRG: 853 | End: 2020-03-10
Payer: COMMERCIAL

## 2020-03-10 LAB
ALBUMIN SERPL-MCNC: 2.7 GM/DL (ref 3.4–5)
ALP BLD-CCNC: 99 IU/L (ref 40–128)
ALT SERPL-CCNC: 21 U/L (ref 10–40)
AMMONIA: 29 UMOL/L (ref 16–60)
ANION GAP SERPL CALCULATED.3IONS-SCNC: 14 MMOL/L (ref 4–16)
AST SERPL-CCNC: 31 IU/L (ref 15–37)
BASE EXCESS MIXED: ABNORMAL (ref 0–1.2)
BASOPHILS ABSOLUTE: 0.1 K/CU MM
BASOPHILS RELATIVE PERCENT: 0.7 % (ref 0–1)
BILIRUB SERPL-MCNC: 0.4 MG/DL (ref 0–1)
BUN BLDV-MCNC: 36 MG/DL (ref 6–23)
CALCIUM IONIZED: 3.8 MG/DL (ref 4.48–5.28)
CALCIUM SERPL-MCNC: 7.4 MG/DL (ref 8.3–10.6)
CARBON MONOXIDE, BLOOD: 2.2 % (ref 0–5)
CHLORIDE BLD-SCNC: 101 MMOL/L (ref 99–110)
CO2 CONTENT: 26.1 MMOL/L (ref 19–24)
CO2: 21 MMOL/L (ref 21–32)
COMMENT: ABNORMAL
CREAT SERPL-MCNC: 2 MG/DL (ref 0.9–1.3)
DIFFERENTIAL TYPE: ABNORMAL
DOSE AMOUNT: NORMAL
DOSE TIME: NORMAL
EOSINOPHILS ABSOLUTE: 0.2 K/CU MM
EOSINOPHILS RELATIVE PERCENT: 2.2 % (ref 0–3)
GFR AFRICAN AMERICAN: 42 ML/MIN/1.73M2
GFR NON-AFRICAN AMERICAN: 35 ML/MIN/1.73M2
GLUCOSE BLD-MCNC: 106 MG/DL (ref 70–99)
GLUCOSE BLD-MCNC: 73 MG/DL (ref 70–99)
GLUCOSE BLD-MCNC: 90 MG/DL (ref 70–99)
GLUCOSE BLD-MCNC: 90 MG/DL (ref 70–99)
GLUCOSE BLD-MCNC: 94 MG/DL (ref 70–99)
GLUCOSE, CSF: ABNORMAL MG/DL (ref 40–75)
HCO3 ARTERIAL: 25 MMOL/L (ref 18–23)
HCT VFR BLD CALC: 34.8 % (ref 42–52)
HEMOGLOBIN: 10.7 GM/DL (ref 13.5–18)
IMMATURE NEUTROPHIL %: 0.7 % (ref 0–0.43)
IONIZED CA: 0.95 MMOL/L (ref 1.12–1.32)
LACTATE: 1.1 MMOL/L (ref 0.4–2)
LYMPHOCYTES ABSOLUTE: 0.9 K/CU MM
LYMPHOCYTES RELATIVE PERCENT: 11.5 % (ref 24–44)
LYMPHS CSF: 69 %
MAGNESIUM: 2.9 MG/DL (ref 1.8–2.4)
MCH RBC QN AUTO: 29.3 PG (ref 27–31)
MCHC RBC AUTO-ENTMCNC: 30.7 % (ref 32–36)
MCV RBC AUTO: 95.3 FL (ref 78–100)
METHEMOGLOBIN ARTERIAL: 0.9 %
MONOCYTES ABSOLUTE: 0.9 K/CU MM
MONOCYTES RELATIVE PERCENT: 11.8 % (ref 0–4)
NUCLEATED RBC %: 0 %
O2 SATURATION: 93.9 % (ref 96–97)
OTHER CELLS, CSF: ABNORMAL %
PCO2 ARTERIAL: 36 MMHG (ref 32–45)
PDW BLD-RTO: 14.7 % (ref 11.7–14.9)
PH BLOOD: 7.45 (ref 7.34–7.45)
PLATELET # BLD: 232 K/CU MM (ref 140–440)
PMV BLD AUTO: 10.5 FL (ref 7.5–11.1)
PO2 ARTERIAL: 66 MMHG (ref 75–100)
POTASSIUM SERPL-SCNC: 3.9 MMOL/L (ref 3.5–5.1)
PROTEIN CSF: 41 MG/DL (ref 15–45)
RBC # BLD: 3.65 M/CU MM (ref 4.6–6.2)
RBC CSF: 41 CU MM
SEGMENTED NEUTROPHILS ABSOLUTE COUNT: 5.6 K/CU MM
SEGMENTED NEUTROPHILS RELATIVE PERCENT: 73.1 % (ref 36–66)
SEGS, CSF: 0 %
SODIUM BLD-SCNC: 136 MMOL/L (ref 135–145)
TOTAL IMMATURE NEUTOROPHIL: 0.05 K/CU MM
TOTAL NUCLEATED RBC: 0 K/CU MM
TOTAL PROTEIN: 4.7 GM/DL (ref 6.4–8.2)
VANCOMYCIN RANDOM: 9.3 UG/ML
VANCOMYCIN RANDOM: NORMAL UG/ML
WBC # BLD: 7.6 K/CU MM (ref 4–10.5)
WBC CSF: 2 CU MM (ref 0–5)

## 2020-03-10 PROCEDURE — 6360000002 HC RX W HCPCS: Performed by: INTERNAL MEDICINE

## 2020-03-10 PROCEDURE — 2500000003 HC RX 250 WO HCPCS: Performed by: INTERNAL MEDICINE

## 2020-03-10 PROCEDURE — 95819 EEG AWAKE AND ASLEEP: CPT

## 2020-03-10 PROCEDURE — 2700000000 HC OXYGEN THERAPY PER DAY

## 2020-03-10 PROCEDURE — 6360000002 HC RX W HCPCS: Performed by: HOSPITALIST

## 2020-03-10 PROCEDURE — 2580000003 HC RX 258: Performed by: HOSPITALIST

## 2020-03-10 PROCEDURE — 80202 ASSAY OF VANCOMYCIN: CPT

## 2020-03-10 PROCEDURE — 2580000003 HC RX 258: Performed by: INTERNAL MEDICINE

## 2020-03-10 PROCEDURE — 95822 EEG COMA OR SLEEP ONLY: CPT | Performed by: PSYCHIATRY & NEUROLOGY

## 2020-03-10 PROCEDURE — 94761 N-INVAS EAR/PLS OXIMETRY MLT: CPT

## 2020-03-10 PROCEDURE — C9113 INJ PANTOPRAZOLE SODIUM, VIA: HCPCS | Performed by: INTERNAL MEDICINE

## 2020-03-10 PROCEDURE — 83605 ASSAY OF LACTIC ACID: CPT

## 2020-03-10 PROCEDURE — 94640 AIRWAY INHALATION TREATMENT: CPT

## 2020-03-10 PROCEDURE — 82803 BLOOD GASES ANY COMBINATION: CPT

## 2020-03-10 PROCEDURE — 83735 ASSAY OF MAGNESIUM: CPT

## 2020-03-10 PROCEDURE — 87205 SMEAR GRAM STAIN: CPT

## 2020-03-10 PROCEDURE — 94003 VENT MGMT INPAT SUBQ DAY: CPT

## 2020-03-10 PROCEDURE — 6370000000 HC RX 637 (ALT 250 FOR IP): Performed by: INTERNAL MEDICINE

## 2020-03-10 PROCEDURE — 82962 GLUCOSE BLOOD TEST: CPT

## 2020-03-10 PROCEDURE — 6370000000 HC RX 637 (ALT 250 FOR IP): Performed by: HOSPITALIST

## 2020-03-10 PROCEDURE — 70551 MRI BRAIN STEM W/O DYE: CPT

## 2020-03-10 PROCEDURE — 85025 COMPLETE CBC W/AUTO DIFF WBC: CPT

## 2020-03-10 PROCEDURE — 71045 X-RAY EXAM CHEST 1 VIEW: CPT

## 2020-03-10 PROCEDURE — 36620 INSERTION CATHETER ARTERY: CPT

## 2020-03-10 PROCEDURE — 82330 ASSAY OF CALCIUM: CPT

## 2020-03-10 PROCEDURE — 2000000000 HC ICU R&B

## 2020-03-10 PROCEDURE — 99233 SBSQ HOSP IP/OBS HIGH 50: CPT | Performed by: NURSE PRACTITIONER

## 2020-03-10 PROCEDURE — 80053 COMPREHEN METABOLIC PANEL: CPT

## 2020-03-10 PROCEDURE — 82140 ASSAY OF AMMONIA: CPT

## 2020-03-10 PROCEDURE — 80048 BASIC METABOLIC PNL TOTAL CA: CPT

## 2020-03-10 RX ORDER — CARBOXYMETHYLCELLULOSE SODIUM 10 MG/ML
1 GEL OPHTHALMIC
Status: DISCONTINUED | OUTPATIENT
Start: 2020-03-10 | End: 2020-03-24 | Stop reason: HOSPADM

## 2020-03-10 RX ADMIN — PANTOPRAZOLE SODIUM 40 MG: 40 INJECTION, POWDER, FOR SOLUTION INTRAVENOUS at 08:36

## 2020-03-10 RX ADMIN — CHLORHEXIDINE GLUCONATE 0.12% ORAL RINSE 15 ML: 1.2 LIQUID ORAL at 08:36

## 2020-03-10 RX ADMIN — ALBUTEROL SULFATE 4 PUFF: 90 AEROSOL, METERED RESPIRATORY (INHALATION) at 07:37

## 2020-03-10 RX ADMIN — Medication 100 MCG/HR: at 02:54

## 2020-03-10 RX ADMIN — CEFTRIAXONE SODIUM 2 G: 2 INJECTION, POWDER, FOR SOLUTION INTRAMUSCULAR; INTRAVENOUS at 08:36

## 2020-03-10 RX ADMIN — CEFTRIAXONE SODIUM 2 G: 2 INJECTION, POWDER, FOR SOLUTION INTRAMUSCULAR; INTRAVENOUS at 20:16

## 2020-03-10 RX ADMIN — ALBUTEROL SULFATE 4 PUFF: 90 AEROSOL, METERED RESPIRATORY (INHALATION) at 15:24

## 2020-03-10 RX ADMIN — SODIUM CHLORIDE: 9 INJECTION, SOLUTION INTRAVENOUS at 04:08

## 2020-03-10 RX ADMIN — SODIUM CHLORIDE: 9 INJECTION, SOLUTION INTRAVENOUS at 20:16

## 2020-03-10 RX ADMIN — CARBOXYMETHYLCELLULOSE SODIUM 1 DROP: 10 GEL OPHTHALMIC at 18:43

## 2020-03-10 RX ADMIN — VANCOMYCIN HYDROCHLORIDE 1500 MG: 5 INJECTION, POWDER, LYOPHILIZED, FOR SOLUTION INTRAVENOUS at 10:40

## 2020-03-10 RX ADMIN — DOPAMINE HYDROCHLORIDE 5 MCG/KG/MIN: 160 INJECTION, SOLUTION INTRAVENOUS at 04:08

## 2020-03-10 RX ADMIN — AMPICILLIN SODIUM AND SULBACTAM SODIUM 1.5 G: 1; .5 INJECTION, POWDER, FOR SOLUTION INTRAMUSCULAR; INTRAVENOUS at 00:28

## 2020-03-10 RX ADMIN — AMPICILLIN SODIUM AND SULBACTAM SODIUM 1.5 G: 1; .5 INJECTION, POWDER, FOR SOLUTION INTRAMUSCULAR; INTRAVENOUS at 15:13

## 2020-03-10 RX ADMIN — ACYCLOVIR SODIUM 1000 MG: 50 INJECTION, SOLUTION INTRAVENOUS at 15:13

## 2020-03-10 RX ADMIN — CHLORHEXIDINE GLUCONATE 0.12% ORAL RINSE 15 ML: 1.2 LIQUID ORAL at 20:16

## 2020-03-10 RX ADMIN — Medication 2 PUFF: at 20:32

## 2020-03-10 RX ADMIN — DOPAMINE HYDROCHLORIDE 5 MCG/KG/MIN: 160 INJECTION, SOLUTION INTRAVENOUS at 18:43

## 2020-03-10 RX ADMIN — Medication 2 PUFF: at 11:37

## 2020-03-10 RX ADMIN — Medication 10 ML: at 08:37

## 2020-03-10 RX ADMIN — Medication 75 MCG/HR: at 20:16

## 2020-03-10 RX ADMIN — AMPICILLIN SODIUM AND SULBACTAM SODIUM 1.5 G: 1; .5 INJECTION, POWDER, FOR SOLUTION INTRAMUSCULAR; INTRAVENOUS at 06:30

## 2020-03-10 RX ADMIN — Medication 2 PUFF: at 07:37

## 2020-03-10 RX ADMIN — ASPIRIN 81 MG 81 MG: 81 TABLET ORAL at 08:36

## 2020-03-10 RX ADMIN — Medication 2 PUFF: at 15:24

## 2020-03-10 RX ADMIN — CALCIUM GLUCONATE 1 G: 98 INJECTION, SOLUTION INTRAVENOUS at 08:35

## 2020-03-10 RX ADMIN — ALBUTEROL SULFATE 4 PUFF: 90 AEROSOL, METERED RESPIRATORY (INHALATION) at 11:38

## 2020-03-10 RX ADMIN — MIDAZOLAM HYDROCHLORIDE 7 MG/HR: 5 INJECTION, SOLUTION INTRAMUSCULAR; INTRAVENOUS at 13:21

## 2020-03-10 RX ADMIN — SODIUM CHLORIDE, PRESERVATIVE FREE 10 ML: 5 INJECTION INTRAVENOUS at 08:36

## 2020-03-10 RX ADMIN — ALBUTEROL SULFATE 4 PUFF: 90 AEROSOL, METERED RESPIRATORY (INHALATION) at 20:32

## 2020-03-10 RX ADMIN — AMPICILLIN SODIUM AND SULBACTAM SODIUM 1.5 G: 1; .5 INJECTION, POWDER, FOR SOLUTION INTRAMUSCULAR; INTRAVENOUS at 18:43

## 2020-03-10 RX ADMIN — ACYCLOVIR SODIUM 1000 MG: 50 INJECTION, SOLUTION INTRAVENOUS at 04:30

## 2020-03-10 ASSESSMENT — PULMONARY FUNCTION TESTS
PIF_VALUE: 0
PIF_VALUE: 31
PIF_VALUE: 30
PIF_VALUE: 36
PIF_VALUE: 28
PIF_VALUE: 35
PIF_VALUE: 28
PIF_VALUE: 28
PIF_VALUE: 31
PIF_VALUE: 34
PIF_VALUE: 29
PIF_VALUE: 30
PIF_VALUE: 28
PIF_VALUE: 27
PIF_VALUE: 30
PIF_VALUE: 31
PIF_VALUE: 31
PIF_VALUE: 29

## 2020-03-10 NOTE — PROGRESS NOTES
-Acidosis has resolved. Nephrology following. Likely from elevated lactate. Patient received sodium bicarb. 3-Acute respiratory failure due to severe metabolic acidosis  CT chest non-acute, on the vent. pulm consulted. 4-Acute metabolic encephalopathy, Likely multifactorial from hyperammonemia, amphetamine use, acute kidney injury, sepsis  ammonia level was 470- normal LFTs- CT with no cirrhosis- repeat ammonia normalized, CT head non-acute- as above neuro consulted. Consider MRI once more stable  -Neuro following. CSF shows slightly elevated WBCs. Possible aseptic meningitis. Cultures pending. EEG pending. 5-Hyperglycemia  not diabetic as  hemoglobin A1C is normal    6-NSTEMI with arrythmia/heart block  on dopamine drip, cardio following  -Cardiac cath: Mild CAD. Echo: 50-55%; severe eccentric aortic regurgitation. Aspirin. 7-? Alcoholism, CIWA once extubated. 8 acute kidney injury   -Nephrology following. Creatinine appears to be stable. 9 hypocalcemia  -Replacement protocol and recheck    10 rhabdomyolysis  -May need EEG. Possible concern for seizure. Recheck CK. On IV fluids. 11 severe aortic regurg  -Will need valve surgery once stable.      -family reports he is not on any prescription medications, no known chronic medical issues. Diet DIET CARDIAC;   DVT Prophylaxis [x] Lovenox, []  Heparin, [] SCDs, [] Ambulation   GI Prophylaxis [] PPI,  [] H2 Blocker,  [] Carafate,  [] Diet/Tube Feeds   Code Status Full Code   Disposition Need overall condition to improve.    CMS Level of Risk [] Low, [] Moderate,[x]  High  Patient's risk as above due to severe sepsis/metabolic acidosis     Vishal Gutierrez MD 3/10/2020 4:39 PM

## 2020-03-10 NOTE — PROGRESS NOTES
Glucose, CSF Latest Ref Range: 40 - 75 MG/DL  71. .. Protein, CSF Latest Ref Range: 15 - 45 MG/DL  41   RBC, CSF Latest Ref Range: 0 CU MM 28... (H) 41 (H)   WBC, CSF Latest Ref Range: 0 - 5 CU MM 6... (H) 2   Lymphs, CSF Latest Units: % 100 69   Other Cells, CSF Latest Units: CU MM OTHER CELL TYPE NOT SEEN    Other Cells, CSF Latest Units: % 0 31 MONOCYTE   Segs Latest Units: % 0 0       CSF culture pending  HSV PCR pending     IMAGING:    CT of head: Motion limited study.       No gross evidence of acute intracranial abnormality. MRI brain:  No acute intracranial abnormality visualized. ASSESSMENT/PLAN:     1. Toxic/metabolic encephalopathy from acute infectious process, hyperammonemia, and hyperglycemia. Given leukocytosis, AMS, and fever will need to R/O meningitis/encephalitis. · MRI brain as above  · LP results as above awaiting HSV  · EEG slow secondary to sedation but no seizure   · Blood cultures pending  · Continue Acyclovir   · Will see tomorrow     Thank you for allowing us to participate in the care of your patient. If there are any questions regarding evaluation please feel free to contact us.      Harwood Severin, APRN - ELAINE, 3/10/2020

## 2020-03-10 NOTE — PROGRESS NOTES
Tiana OSHEA has been paged due to patients low diastolic pressure being high 30s to lower 40s. MAP low 60s. Per Tiana Price give 250 bolus, possibly start levophed gtt if this intervention doesn't help.  Will continue to monitor

## 2020-03-10 NOTE — PROCEDURES
Electroencephalography (EEG) [address] [facility]        Patient:  Yolanda Graham Procedure Date: 30/10/21   YOB: 1965 Referring Practitioner: Phyllis Lorenzana NP   MRN: [id number] Interpreting Physician: Dr. Leena Vaughn         HISTORY:    This is a 62 y.o. old male presenting for evaluation of altered mental status. REPORT:    With the patient intubated and sedated, the background shows no clear dominant occipital rhythm. There is low voltage beta activity recorded diffusely. There was irregular polymorphic delta activity recorded diffusely at 3 Hz. Excessive beta activity was seen throughout. There are no clear focal findings. No epileptiform discharges were noted. A lead clipping artifact was seen at T8. No clear sleep pattern was seen. Photic stimulation were performed as an activating maneuver during the recording; no abnormalities were elicited by this maneuver. Hyperventilation was not performed. No abnormalities were seen in the EKG monitoring channel. IMPRESSION:      Abnormal EEG due to severe diffuse slowing of the background. No focal slowing or epileptiform discharges were seen. This is a non-specific finding and can be seen in variety of encephalopathies. Clinical correlation advised.       Electronically signed by: Hoa Perea DO 3/10/2020 7:35 PM

## 2020-03-10 NOTE — PROGRESS NOTES
03/10/20  0505   WBC 12.8* 5.9 7.6   HGB 10.3* 12.8* 10.7*   HCT 33.1* 40.4* 34.8*   MCV 95.9 93.1 95.3    247 232     BMP:   Recent Labs     03/08/20  1305 03/08/20  2110 03/09/20  0315 03/10/20  0505   * 138 139 136   K 3.9 3.6 3.7 3.9    100 101 101   CO2 18* 22 21 21   PHOS 5.0*  --  6.0*  --    BUN 23  --  29* 36*   CREATININE 1.9*  --  2.0* 2.0*     LIVER PROFILE:   Recent Labs     03/08/20  0345 03/08/20  0700 03/09/20  0315 03/10/20  0505   AST 28  --  52* 31   ALT 25  --  29 21   LIPASE  --  22  --   --    BILITOT 1.2*  --  0.8 0.4   ALKPHOS 121  --  114 99     PT/INR:   Recent Labs     03/08/20  0700   PROTIME 15.3*   INR 1.26     APTT: No results for input(s): APTT in the last 72 hours. BNP:  No results for input(s): BNP in the last 72 hours.       Assessment:  Patient Active Problem List    Diagnosis Date Noted    Sepsis [facility]) 03/08/2020    Anxiety        Electronically signed by Theodore Gregory PA-C on 3/10/2020 at 9:55 AM

## 2020-03-10 NOTE — PROGRESS NOTES
Nephrology Progress Note        [address], [address] [place] [address]  Phone: [phone]  Office Hours: 8:30AM - 4:30PM  Monday - Friday       [facility]  Rip Borer, MD Bernita Babinski, DO [address],  [facility]  [facility]  PHONE: [phone]  FAX: [phone]    3/10/2020 8:51 AM  Subjective:   Admit Date: 3/8/2020  PCP: No primary care provider on file. Interval History: remains intubated  S/p lhc yesterday  Wife at bedside    Diet: DIET CARDIAC;      Data:   Scheduled Meds:   acyclovir  10 mg/kg Intravenous Q8H    sodium chloride flush  10 mL Intravenous 2 times per day    ampicillin-sulbactam  1.5 g Intravenous Q6H    vancomycin (VANCOCIN) intermittent dosing (placeholder)   Other See Admin Instructions    cefTRIAXone (ROCEPHIN) IV  2 g Intravenous Q12H    sodium chloride flush  10 mL Intravenous BID    sodium chloride flush  10 mL Intravenous 2 times per day    pantoprazole  40 mg Intravenous Daily    insulin lispro  0-12 Units Subcutaneous Q6H    chlorhexidine  15 mL Mouth/Throat BID    aspirin  81 mg Oral Daily    albuterol sulfate HFA  4 puff Inhalation Q4H WA    ipratropium  2 puff Inhalation Q4H WA     Continuous Infusions:   sodium chloride 50 mL/hr at 03/10/20 0408    dextrose      DOPamine 5 mcg/kg/min (03/10/20 0408)    midazolam 7 mg/hr (03/10/20 0528)    fentanyl 100 mcg/hr (03/10/20 0254)     PRN Meds:sodium chloride flush, acetaminophen, glucose, dextrose, glucagon (rDNA), dextrose, sodium chloride flush, [DISCONTINUED] acetaminophen **OR** acetaminophen, calcium gluconate IVPB **OR** calcium gluconate IVPB **OR** calcium gluconate IVPB **OR** calcium gluconate IVPB, sodium phosphate IVPB **OR** sodium phosphate IVPB  I/O last 3 completed shifts: In: 2733.7 [I.V.:1533.7; IV Piggyback:1200]  Out: 1985 [Urine:1425; Emesis/NG output:560]  No intake/output data recorded.     Intake/Output Summary (Last 24 hours) at 3/10/2020 0851  Last data filed at 3/10/2020 0518  Gross per 24 hour   Intake 2733.71 ml   Output 1985 ml   Net 748.71 ml       CBC:   Recent Labs     03/08/20  0843 03/09/20  0315 03/10/20  0505   WBC 12.8* 5.9 7.6   HGB 10.3* 12.8* 10.7*    247 232       BMP:    Recent Labs     03/08/20  1305 03/08/20  2110 03/09/20  0315 03/10/20  0505   * 138 139 136   K 3.9 3.6 3.7 3.9    100 101 101   CO2 18* 22 21 21   BUN 23  --  29* 36*   CREATININE 1.9*  --  2.0* 2.0*   GLUCOSE 118*  --  104* 106*     Hepatic:   Recent Labs     03/08/20  0345 03/09/20  0315 03/10/20  0505   AST 28 52* 31   ALT 25 29 21   BILITOT 1.2* 0.8 0.4   ALKPHOS 121 114 99     Troponin: No results for input(s): TROPONINI in the last 72 hours. BNP: No results for input(s): BNP in the last 72 hours. Lipids: No results for input(s): CHOL, HDL in the last 72 hours. Invalid input(s): LDLCALCU  ABGs:   Lab Results   Component Value Date    PO2ART 66 03/10/2020    CJM1GYM 36.0 03/10/2020     INR:   Recent Labs     03/08/20  0700   INR 1.26       Objective:   Vitals: BP (!) 113/47   Pulse 97   Temp 99 °F (37.2 °C) (Rectal)   Resp 18   Ht 5' 10\" (1.778 m)   Wt 220 lb (99.8 kg)   SpO2 95%   BMI 31.57 kg/m²   General appearance:  in no acute distress  HEENT: normocephalic, atraumatic,   Neck: supple, trachea midline  Lungs:breathing comfortably on mv  Heart[de-identified] regular rate and rhythm,  Abdomen: soft, non-tender; non distended,  Extremities: extremities atraumatic, no cyanosis or edema  Neurologic: sedated    Assessment and Plan:   - JUSTIN from ATN; no HN on CT A/P  - ANIOn gap metabolic acidosis:  from lactic aicdosis//Bicarb level of 9 on admission //Serum acetaminophen and salicylate, ethanol and BHA were negative.////nolrmal osmolal gap of -7//resolved with volume expansion  - Acute hypoxic resp failure  - ?? ??Polysusbstance abuse?: amphetamines positive as of now  - Hyperglycemia  - Leukocytosis/fevers  -

## 2020-03-11 ENCOUNTER — APPOINTMENT (OUTPATIENT)
Dept: GENERAL RADIOLOGY | Age: 55
DRG: 853 | End: 2020-03-11
Payer: COMMERCIAL

## 2020-03-11 ENCOUNTER — APPOINTMENT (OUTPATIENT)
Dept: CT IMAGING | Age: 55
DRG: 853 | End: 2020-03-11
Payer: COMMERCIAL

## 2020-03-11 ENCOUNTER — APPOINTMENT (OUTPATIENT)
Dept: MRI IMAGING | Age: 55
DRG: 853 | End: 2020-03-11
Payer: COMMERCIAL

## 2020-03-11 LAB
ACTIVATED CLOTTING TIME, LOW RANGE: 168 SEC
ACTIVATED CLOTTING TIME, LOW RANGE: 177 SEC
ALBUMIN SERPL-MCNC: 2.5 GM/DL (ref 3.4–5)
ALP BLD-CCNC: 93 IU/L (ref 40–128)
ALT SERPL-CCNC: 20 U/L (ref 10–40)
ANION GAP SERPL CALCULATED.3IONS-SCNC: 9 MMOL/L (ref 4–16)
AST SERPL-CCNC: 32 IU/L (ref 15–37)
BASE EXCESS MIXED: ABNORMAL (ref 0–1.2)
BASOPHILS ABSOLUTE: 0.1 K/CU MM
BASOPHILS RELATIVE PERCENT: 0.8 % (ref 0–1)
BILIRUB SERPL-MCNC: 0.2 MG/DL (ref 0–1)
BUN BLDV-MCNC: 22 MG/DL (ref 6–23)
CALCIUM SERPL-MCNC: 7.4 MG/DL (ref 8.3–10.6)
CARBON MONOXIDE, BLOOD: 1.6 % (ref 0–5)
CHLORIDE BLD-SCNC: 103 MMOL/L (ref 99–110)
CO2 CONTENT: 26.7 MMOL/L (ref 19–24)
CO2: 22 MMOL/L (ref 21–32)
COMMENT: ABNORMAL
CREAT SERPL-MCNC: 1.3 MG/DL (ref 0.9–1.3)
DIFFERENTIAL TYPE: ABNORMAL
DOSE AMOUNT: NORMAL
DOSE TIME: NORMAL
EOSINOPHILS ABSOLUTE: 0.3 K/CU MM
EOSINOPHILS RELATIVE PERCENT: 3.1 % (ref 0–3)
GFR AFRICAN AMERICAN: >60 ML/MIN/1.73M2
GFR NON-AFRICAN AMERICAN: 57 ML/MIN/1.73M2
GLUCOSE BLD-MCNC: 165 MG/DL (ref 70–99)
GLUCOSE BLD-MCNC: 63 MG/DL (ref 70–99)
GLUCOSE BLD-MCNC: 81 MG/DL (ref 70–99)
GLUCOSE BLD-MCNC: 86 MG/DL (ref 70–99)
GLUCOSE BLD-MCNC: 89 MG/DL (ref 70–99)
GLUCOSE BLD-MCNC: 91 MG/DL (ref 70–99)
HCO3 ARTERIAL: 25.4 MMOL/L (ref 18–23)
HCT VFR BLD CALC: 33.7 % (ref 42–52)
HEMOGLOBIN: 10.3 GM/DL (ref 13.5–18)
HSV BY PCR: NORMAL
HSV BY PCR: NORMAL
IMMATURE NEUTROPHIL %: 0.4 % (ref 0–0.43)
LACTATE: 0.6 MMOL/L (ref 0.4–2)
LYMPHOCYTES ABSOLUTE: 1.3 K/CU MM
LYMPHOCYTES RELATIVE PERCENT: 15.3 % (ref 24–44)
MAGNESIUM: 2.5 MG/DL (ref 1.8–2.4)
MCH RBC QN AUTO: 28.9 PG (ref 27–31)
MCHC RBC AUTO-ENTMCNC: 30.6 % (ref 32–36)
MCV RBC AUTO: 94.7 FL (ref 78–100)
METHEMOGLOBIN ARTERIAL: 0.9 %
MONOCYTES ABSOLUTE: 0.7 K/CU MM
MONOCYTES RELATIVE PERCENT: 8.6 % (ref 0–4)
NUCLEATED RBC %: 0 %
O2 SATURATION: 94 % (ref 96–97)
PCO2 ARTERIAL: 41 MMHG (ref 32–45)
PDW BLD-RTO: 14.7 % (ref 11.7–14.9)
PH BLOOD: 7.4 (ref 7.34–7.45)
PLATELET # BLD: 255 K/CU MM (ref 140–440)
PMV BLD AUTO: 10.3 FL (ref 7.5–11.1)
PO2 ARTERIAL: 72 MMHG (ref 75–100)
POTASSIUM SERPL-SCNC: 3.5 MMOL/L (ref 3.5–5.1)
PROCALCITONIN: 2.76
RBC # BLD: 3.56 M/CU MM (ref 4.6–6.2)
SEGMENTED NEUTROPHILS ABSOLUTE COUNT: 5.9 K/CU MM
SEGMENTED NEUTROPHILS RELATIVE PERCENT: 71.8 % (ref 36–66)
SODIUM BLD-SCNC: 134 MMOL/L (ref 135–145)
TOTAL IMMATURE NEUTOROPHIL: 0.03 K/CU MM
TOTAL NUCLEATED RBC: 0 K/CU MM
TOTAL PROTEIN: 4.5 GM/DL (ref 6.4–8.2)
VANCOMYCIN RANDOM: 9.5 UG/ML
VANCOMYCIN RANDOM: NORMAL UG/ML
WBC # BLD: 8.3 K/CU MM (ref 4–10.5)

## 2020-03-11 PROCEDURE — 6370000000 HC RX 637 (ALT 250 FOR IP): Performed by: INTERNAL MEDICINE

## 2020-03-11 PROCEDURE — 2000000000 HC ICU R&B

## 2020-03-11 PROCEDURE — 2580000003 HC RX 258: Performed by: INTERNAL MEDICINE

## 2020-03-11 PROCEDURE — 80202 ASSAY OF VANCOMYCIN: CPT

## 2020-03-11 PROCEDURE — 80053 COMPREHEN METABOLIC PANEL: CPT

## 2020-03-11 PROCEDURE — 6360000002 HC RX W HCPCS: Performed by: HOSPITALIST

## 2020-03-11 PROCEDURE — 86141 C-REACTIVE PROTEIN HS: CPT

## 2020-03-11 PROCEDURE — 2580000003 HC RX 258: Performed by: HOSPITALIST

## 2020-03-11 PROCEDURE — 6360000002 HC RX W HCPCS

## 2020-03-11 PROCEDURE — 85347 COAGULATION TIME ACTIVATED: CPT

## 2020-03-11 PROCEDURE — 71045 X-RAY EXAM CHEST 1 VIEW: CPT

## 2020-03-11 PROCEDURE — 82803 BLOOD GASES ANY COMBINATION: CPT

## 2020-03-11 PROCEDURE — 31720 CLEARANCE OF AIRWAYS: CPT

## 2020-03-11 PROCEDURE — 2500000003 HC RX 250 WO HCPCS: Performed by: INTERNAL MEDICINE

## 2020-03-11 PROCEDURE — 36592 COLLECT BLOOD FROM PICC: CPT

## 2020-03-11 PROCEDURE — 2700000000 HC OXYGEN THERAPY PER DAY

## 2020-03-11 PROCEDURE — 74176 CT ABD & PELVIS W/O CONTRAST: CPT

## 2020-03-11 PROCEDURE — 83735 ASSAY OF MAGNESIUM: CPT

## 2020-03-11 PROCEDURE — 94761 N-INVAS EAR/PLS OXIMETRY MLT: CPT

## 2020-03-11 PROCEDURE — 6360000002 HC RX W HCPCS: Performed by: INTERNAL MEDICINE

## 2020-03-11 PROCEDURE — 99233 SBSQ HOSP IP/OBS HIGH 50: CPT | Performed by: NURSE PRACTITIONER

## 2020-03-11 PROCEDURE — APPSS60 APP SPLIT SHARED TIME 46-60 MINUTES: Performed by: NURSE PRACTITIONER

## 2020-03-11 PROCEDURE — C9113 INJ PANTOPRAZOLE SODIUM, VIA: HCPCS | Performed by: INTERNAL MEDICINE

## 2020-03-11 PROCEDURE — 83605 ASSAY OF LACTIC ACID: CPT

## 2020-03-11 PROCEDURE — 94003 VENT MGMT INPAT SUBQ DAY: CPT

## 2020-03-11 PROCEDURE — 84145 PROCALCITONIN (PCT): CPT

## 2020-03-11 PROCEDURE — 85025 COMPLETE CBC W/AUTO DIFF WBC: CPT

## 2020-03-11 PROCEDURE — 82962 GLUCOSE BLOOD TEST: CPT

## 2020-03-11 PROCEDURE — 99223 1ST HOSP IP/OBS HIGH 75: CPT | Performed by: INTERNAL MEDICINE

## 2020-03-11 PROCEDURE — 94640 AIRWAY INHALATION TREATMENT: CPT

## 2020-03-11 RX ORDER — LORAZEPAM 2 MG/ML
1 INJECTION INTRAMUSCULAR EVERY 4 HOURS PRN
Status: DISCONTINUED | OUTPATIENT
Start: 2020-03-11 | End: 2020-03-15

## 2020-03-11 RX ORDER — ZIPRASIDONE MESYLATE 20 MG/ML
10 INJECTION, POWDER, LYOPHILIZED, FOR SOLUTION INTRAMUSCULAR EVERY 6 HOURS PRN
Status: DISCONTINUED | OUTPATIENT
Start: 2020-03-11 | End: 2020-03-20

## 2020-03-11 RX ORDER — IPRATROPIUM BROMIDE AND ALBUTEROL SULFATE 2.5; .5 MG/3ML; MG/3ML
1 SOLUTION RESPIRATORY (INHALATION)
Status: DISCONTINUED | OUTPATIENT
Start: 2020-03-11 | End: 2020-03-15

## 2020-03-11 RX ORDER — ATROPINE SULFATE 0.1 MG/ML
INJECTION INTRAVENOUS
Status: DISCONTINUED
Start: 2020-03-11 | End: 2020-03-11 | Stop reason: WASHOUT

## 2020-03-11 RX ORDER — DIPHENHYDRAMINE HCL 25 MG
25 TABLET ORAL EVERY 6 HOURS PRN
Status: DISCONTINUED | OUTPATIENT
Start: 2020-03-11 | End: 2020-03-12

## 2020-03-11 RX ORDER — ZIPRASIDONE MESYLATE 20 MG/ML
INJECTION, POWDER, LYOPHILIZED, FOR SOLUTION INTRAMUSCULAR
Status: COMPLETED
Start: 2020-03-11 | End: 2020-03-11

## 2020-03-11 RX ADMIN — ACYCLOVIR SODIUM 730 MG: 50 INJECTION, SOLUTION INTRAVENOUS at 16:53

## 2020-03-11 RX ADMIN — ALBUTEROL SULFATE 4 PUFF: 90 AEROSOL, METERED RESPIRATORY (INHALATION) at 21:24

## 2020-03-11 RX ADMIN — Medication 2 PUFF: at 21:24

## 2020-03-11 RX ADMIN — AMPICILLIN SODIUM AND SULBACTAM SODIUM 1.5 G: 1; .5 INJECTION, POWDER, FOR SOLUTION INTRAMUSCULAR; INTRAVENOUS at 05:14

## 2020-03-11 RX ADMIN — PANTOPRAZOLE SODIUM 40 MG: 40 INJECTION, POWDER, FOR SOLUTION INTRAVENOUS at 08:09

## 2020-03-11 RX ADMIN — SODIUM CHLORIDE, PRESERVATIVE FREE 10 ML: 5 INJECTION INTRAVENOUS at 08:10

## 2020-03-11 RX ADMIN — Medication 2 PUFF: at 11:30

## 2020-03-11 RX ADMIN — ZIPRASIDONE MESYLATE 10 MG: 20 INJECTION, POWDER, LYOPHILIZED, FOR SOLUTION INTRAMUSCULAR at 18:10

## 2020-03-11 RX ADMIN — CEFTRIAXONE SODIUM 2 G: 2 INJECTION, POWDER, FOR SOLUTION INTRAMUSCULAR; INTRAVENOUS at 21:23

## 2020-03-11 RX ADMIN — VANCOMYCIN HYDROCHLORIDE 1500 MG: 5 INJECTION, POWDER, LYOPHILIZED, FOR SOLUTION INTRAVENOUS at 10:36

## 2020-03-11 RX ADMIN — DOPAMINE HYDROCHLORIDE 5 MCG/KG/MIN: 160 INJECTION, SOLUTION INTRAVENOUS at 05:14

## 2020-03-11 RX ADMIN — Medication 100 MCG/HR: at 11:50

## 2020-03-11 RX ADMIN — LORAZEPAM 1 MG: 2 INJECTION INTRAMUSCULAR; INTRAVENOUS at 17:20

## 2020-03-11 RX ADMIN — Medication 10 ML: at 08:10

## 2020-03-11 RX ADMIN — CARBOXYMETHYLCELLULOSE SODIUM 1 DROP: 10 GEL OPHTHALMIC at 21:24

## 2020-03-11 RX ADMIN — CHLORHEXIDINE GLUCONATE 0.12% ORAL RINSE 15 ML: 1.2 LIQUID ORAL at 08:09

## 2020-03-11 RX ADMIN — AMPICILLIN SODIUM AND SULBACTAM SODIUM 1.5 G: 1; .5 INJECTION, POWDER, FOR SOLUTION INTRAMUSCULAR; INTRAVENOUS at 13:22

## 2020-03-11 RX ADMIN — AMPICILLIN SODIUM AND SULBACTAM SODIUM 1.5 G: 1; .5 INJECTION, POWDER, FOR SOLUTION INTRAMUSCULAR; INTRAVENOUS at 00:59

## 2020-03-11 RX ADMIN — CHLORHEXIDINE GLUCONATE 0.12% ORAL RINSE 15 ML: 1.2 LIQUID ORAL at 21:24

## 2020-03-11 RX ADMIN — Medication 100 MCG/HR: at 21:55

## 2020-03-11 RX ADMIN — DEXTROSE 50 % IN WATER (D50W) INTRAVENOUS SYRINGE 12.5 G: at 21:17

## 2020-03-11 RX ADMIN — POTASSIUM CHLORIDE: 2 INJECTION, SOLUTION, CONCENTRATE INTRAVENOUS at 10:44

## 2020-03-11 RX ADMIN — ALBUTEROL SULFATE 4 PUFF: 90 AEROSOL, METERED RESPIRATORY (INHALATION) at 07:17

## 2020-03-11 RX ADMIN — MIDAZOLAM HYDROCHLORIDE 10 MG/HR: 5 INJECTION, SOLUTION INTRAMUSCULAR; INTRAVENOUS at 18:49

## 2020-03-11 RX ADMIN — ALBUTEROL SULFATE 4 PUFF: 90 AEROSOL, METERED RESPIRATORY (INHALATION) at 15:06

## 2020-03-11 RX ADMIN — ASPIRIN 81 MG 81 MG: 81 TABLET ORAL at 08:09

## 2020-03-11 RX ADMIN — ALBUTEROL SULFATE 4 PUFF: 90 AEROSOL, METERED RESPIRATORY (INHALATION) at 11:30

## 2020-03-11 RX ADMIN — CEFTRIAXONE SODIUM 2 G: 2 INJECTION, POWDER, FOR SOLUTION INTRAMUSCULAR; INTRAVENOUS at 08:09

## 2020-03-11 RX ADMIN — ACYCLOVIR SODIUM 730 MG: 50 INJECTION, SOLUTION INTRAVENOUS at 03:38

## 2020-03-11 RX ADMIN — Medication 2 PUFF: at 07:17

## 2020-03-11 RX ADMIN — LORAZEPAM 1 MG: 2 INJECTION INTRAMUSCULAR; INTRAVENOUS at 11:51

## 2020-03-11 RX ADMIN — MIDAZOLAM HYDROCHLORIDE 7 MG/HR: 5 INJECTION, SOLUTION INTRAMUSCULAR; INTRAVENOUS at 06:14

## 2020-03-11 RX ADMIN — AMPICILLIN SODIUM AND SULBACTAM SODIUM 1.5 G: 1; .5 INJECTION, POWDER, FOR SOLUTION INTRAMUSCULAR; INTRAVENOUS at 18:48

## 2020-03-11 RX ADMIN — Medication 2 PUFF: at 15:06

## 2020-03-11 ASSESSMENT — PULMONARY FUNCTION TESTS
PIF_VALUE: 31
PIF_VALUE: 30
PIF_VALUE: 18
PIF_VALUE: 12
PIF_VALUE: 31
PIF_VALUE: 32
PIF_VALUE: 31
PIF_VALUE: 16
PIF_VALUE: 35
PIF_VALUE: 33
PIF_VALUE: 33
PIF_VALUE: 30
PIF_VALUE: 33
PIF_VALUE: 33

## 2020-03-11 NOTE — PROGRESS NOTES
03/11/20  0700   PH 7.44 7.45 7.40   PO2ART 63* 66* 72*   PHA7CXE 35.0 36.0 41.0   O2SAT 92.2* 93.9* 94.0*     No results found for: PROBNP, THEOPH  No results found for: [facility]    Radiology Review:  Pertinent images / reports were reviewed as a part of this visit. Assessment:     Patient Active Problem List   Diagnosis    Anxiety    Sepsis ([facility] [facility]    Metabolic encephalopathy    Delirium       Plan:   1. Overall the patient has improved. 2. Discussed with the family. 3. Wean per protocol. 4. Wean sedation.   Torsten Bolton MD  3/11/2020  11:14 AM

## 2020-03-11 NOTE — PROGRESS NOTES
Nephrology Progress Note        [address], [address] [facility], [place] [address]  Phone: [phone]  Office Hours: 8:30AM - 4:30PM  Monday - Friday       [facility]  Meir Brown MD  [address], DO  [address],  [place]  [facility], [place] [address]  PHONE: [phone]  FAX: [phone]    3/11/2020 2:32 PM  Subjective:   Admit Date: 3/8/2020  PCP: No primary care provider on file.   Interval History: remains intubated  S/p lhc   Wife at bedside  Making urine    Diet: Diet NPO Effective Now      Data:   Scheduled Meds:   vancomycin  1,500 mg Intravenous Q18H    atropine        ipratropium-albuterol  1 ampule Inhalation Q4H WA    acyclovir  10 mg/kg (Ideal) Intravenous Q8H    sodium chloride flush  10 mL Intravenous 2 times per day    ampicillin-sulbactam  1.5 g Intravenous Q6H    vancomycin (VANCOCIN) intermittent dosing (placeholder)   Other See Admin Instructions    cefTRIAXone (ROCEPHIN) IV  2 g Intravenous Q12H    sodium chloride flush  10 mL Intravenous BID    sodium chloride flush  10 mL Intravenous 2 times per day    pantoprazole  40 mg Intravenous Daily    insulin lispro  0-12 Units Subcutaneous Q6H    chlorhexidine  15 mL Mouth/Throat BID    aspirin  81 mg Oral Daily    albuterol sulfate HFA  4 puff Inhalation Q4H WA    ipratropium  2 puff Inhalation Q4H WA     Continuous Infusions:   IV infusion builder 50 mL/hr at 03/11/20 1044    dexmedetomidine (PRECEDEX) IV infusion      dextrose      DOPamine 5 mcg/kg/min (03/11/20 0514)    midazolam 10 mg/hr (03/11/20 1117)    fentanyl 100 mcg/hr (03/11/20 1150)     PRN Meds:LORazepam, propylene glycol-glycerin, carboxymethylcellulose, sodium chloride flush, acetaminophen, glucose, dextrose, glucagon (rDNA), dextrose, sodium chloride flush, [DISCONTINUED] acetaminophen **OR** acetaminophen, calcium gluconate IVPB **OR** calcium gluconate IVPB **OR** calcium gluconate IVPB **OR** calcium gluconate IVPB, sodium phosphate IVPB **OR** sodium phosphate IVPB  I/O last 3 completed shifts: In: 3332.4 [I.V.:1782.4; IV OHCJWVPMQ:8380]  Out: 7816 [YXCJE:8693; Emesis/NG output:475]  I/O this shift:  In: 60 [NG/GT:60]  Out: 130 [Emesis/NG output:130]    Intake/Output Summary (Last 24 hours) at 3/11/2020 1432  Last data filed at 3/11/2020 0809  Gross per 24 hour   Intake 3392.37 ml   Output 2345 ml   Net 1047.37 ml       CBC:   Recent Labs     03/09/20  0315 03/10/20  0505 03/11/20  0310   WBC 5.9 7.6 8.3   HGB 12.8* 10.7* 10.3*    232 255       BMP:    Recent Labs     03/09/20 0315 03/10/20  0505 03/11/20  0310    136 134*   K 3.7 3.9 3.5    101 103   CO2 21 21 22   BUN 29* 36* 22   CREATININE 2.0* 2.0* 1.3   GLUCOSE 104* 106* 91     Hepatic:   Recent Labs     03/09/20 0315 03/10/20  0505 03/11/20  0310   AST 52* 31 32   ALT 29 21 20   BILITOT 0.8 0.4 0.2   ALKPHOS 114 99 93     Troponin: No results for input(s): TROPONINI in the last 72 hours. BNP: No results for input(s): BNP in the last 72 hours. Lipids: No results for input(s): CHOL, HDL in the last 72 hours. Invalid input(s): LDLCALCU  ABGs:   Lab Results   Component Value Date    PO2ART 72 03/11/2020    OQK0ZTN 41.0 03/11/2020     INR:   No results for input(s): INR in the last 72 hours.     Objective:   Vitals: BP (!) 112/43   Pulse (!) 48   Temp 98.4 °F (36.9 °C) (Rectal)   Resp 13   Ht 5' 10\" (1.778 m)   Wt 232 lb 5.8 oz (105.4 kg)   SpO2 95%   BMI 33.34 kg/m²   General appearance:  in no acute distress  HEENT: normocephalic, atraumatic,   Neck: supple, trachea midline  Lungs:breathing comfortably on mv  Heart[de-identified] regular rate and rhythm,  Abdomen: soft, non-tender; non distended,  Extremities: extremities atraumatic, no cyanosis or edema  Neurologic: sedated    Assessment and Plan:   - JUSTIN from ATN; no HN on CT A/P  - ANIOn gap metabolic acidosis:  from lactic aicdosis//Bicarb level of 9 on admission //Serum acetaminophen and salicylate, ethanol and BHA were negative.////nolrmal osmolal gap of -7//resolved with volume expansion  - Acute hypoxic resp failure  - ?? ??Polysusbstance abuse?: amphetamines positive as of now  - Hyperglycemia  - Leukocytosis/fevers  - Hyperammonemia  - bradycardia  - encephalopathy     Plan;  - cr improved to 1.3  - ok to continue NS,  - monitor renal function while on acyclovir  - continue supportive mgmt  - please avoid nephrotoxins  - critically ill  Will follow christine                       Electronically signed by Marcie Palafox MD on 3/11/2020 at 2:32 PM    [address] Leyla Lin DO [address],  [place]  [facility], [place] [address]  PHONE: [phone]  FAX: [phone]

## 2020-03-11 NOTE — PROGRESS NOTES
Nutrition Assessment    Type and Reason for Visit: Reassess    Nutrition Recommendations:   · Continue NPO today    Nutrition Assessment: Pt remains intubated and hemodynamically unstable. Will follow up again tomorrow for the ability to start EN. Malnutrition Assessment:  · Malnutrition Status: At risk for malnutrition  · Context: Acute illness or injury  · Findings of the 6 clinical characteristics of malnutrition (Minimum of 2 out of 6 clinical characteristics is required to make the diagnosis of moderate or severe Protein Calorie Malnutrition based on AND/ASPEN Guidelines):  1. Energy Intake-Less than or equal to 50% of estimated energy requirement, (1 day)    2. Weight Loss-No significant weight loss, in 6 months  3. Fat Loss-No significant subcutaneous fat loss, Orbital  4. Muscle Loss-No significant muscle mass loss, Clavicles (pectoralis and deltoids)  5. Fluid Accumulation-Mild fluid accumulation, Generalized  6.   Strength-Not measured    Nutrition Risk Level: High    Nutrient Needs:  · Estimated Daily Total Kcal: 4666 based on britt state 2003b  · Estimated Daily Protein (g): 133-150 based on 1.5-2 g/kg/IBW  · Estimated Daily Total Fluid (ml/day): 2357 based on 1 mL/kcal    Nutrition Diagnosis:   · Problem: Inadequate oral intake  · Etiology: related to Impaired respiratory function-inability to consume food     Signs and symptoms:  as evidenced by NPO status due to medical condition, Intubation    Objective Information:  · Wound Type: None  · Current Nutrition Therapies:  · Oral Diet Orders: NPO   · Anthropometric Measures:  · Ht: 5' 10\" (177.8 cm)   · Current Body Wt: 220 lb (99.8 kg)  · Admission Body Wt: 220 lb (99.8 kg)  · Usual Body Wt: 211 lb (95.7 kg)  · % Weight Change: none noted  · Ideal Body Wt: 166 lb (75.3 kg), % Ideal Body 133%  · BMI Classification: BMI 30.0 - 34.9 Obese Class I    Nutrition Interventions:   Continue NPO  Continued Inpatient Monitoring, Education not appropriate at this time, Coordination of Care    Nutrition Evaluation:   · Evaluation: No progress toward goals   · Goals: pt will have a source of nutrition started by los day 7    · Monitoring: Nutrition Progression, Weight, Pertinent Labs, Monitor Hemodynamic Status      Electronically signed by Tree Eng RD, LD on 1/33/48 at 10:21 AM EDT    Contact Number: [phone]

## 2020-03-11 NOTE — PROGRESS NOTES
Dr. Devan Kendall at bedside rounding. Orders received for weaning protocol of mechanical ventilator today. Currently in process of removing arterial sheath, discussed with MD. Per MD shut off all sedation since in the past it has taken longer periods of time to wake up and follow commands. Discussed that pt will need to remain flat for a certain amount of time after removal. MD wishes sedation stopped as not to delay extubation and sedation may be restarted if neccessary . After approximately 30 minutes off sedation pt is agitated and thrashing in bed, fighting vent, and not following commands. Unable to console pt. At this time sedation restarted for pt safety.  Pending ACT repeat results to continue with sheath removal.

## 2020-03-11 NOTE — PROCEDURES
[address], [address] [place] [address]                                 ECHOCARDIOGRAM    PATIENT NAME: Fara Langford                       :        1965  MED REC NO:   [id number]                          ROOM:       2110  ACCOUNT NO:   [de-identified]                           ADMIT DATE: 2020  PROVIDER:     Dominick Garber MD    PROCEDURE:  GINGER.    INDICATION:  Aortic regurgitation. This is a 57-year-old male patient, procedure was performed with the  patient in room 2110. The patient is intubated, on the vent. The  patient is already on sedation. Consent was obtained from wife. GINGER probe was advanced to the posterior pharynx and mid esophagus. Images were obtained. Left atrium is normal in size. No clot or thrombus noted in the left  atrium or left atrial appendage. Mitral valve leaflet is slightly  thickened. There is mild prolapse of the mitral valve leaflet present. There is a mild-to-moderate mitral regurgitation present. LV cavity is  mildly dilated. LV function is preserved, 50% to 55% present. No ASD  or PFO is noted. Tricuspid valve is normal.  Pulmonic valve is normal.   Aortic value is trileaflet. There is a severe aortic regurgitation  noted. The jet of aortic regurgitation is directed towards the mitral  valve and there is fenestration of the leaflet itself and some prolapse  present. Ascending aorta is normal.  Descending aorta is normal.    IMPRESSION:  1. No clot or thrombus noted in the left atrium, left atrial appendage,  or LV cavity. 2.  EF is around 50% to 55% present. 3.  Mild LV cavity dilatation noted. 4.  Moderate mitral regurgitation noted. 5.  Aortic valve is trileaflet. There is a severe aortic regurgitation  present. One of the jet is directed towards the anterior mitral leaflet  and fenestration noted of the aortic valve. 6.  No ASD or PFO is noted.   7.  Tricuspid valve

## 2020-03-11 NOTE — PROGRESS NOTES
[address] [facility]  consulted by Dr. Dang Gonzalez for monitoring and adjustment. Indication for treatment: Severe sepsis  Goal trough: 15-20 mcg/mL  Other antimicrobials:  Zosyn     Pertinent Laboratory Values:   Temp Readings from Last 3 Encounters:   03/11/20 98.4 °F (36.9 °C) (Rectal)   01/07/20 97.7 °F (36.5 °C)     Recent Labs     03/09/20  0315 03/09/20  0600 03/10/20  0505 03/11/20  0310   WBC 5.9  --  7.6 8.3   LACTATE  --  1.8 1.1 0.6     Recent Labs     03/09/20  0315 03/10/20  0505 03/11/20  0310   BUN 29* 36* 22   CREATININE 2.0* 2.0* 1.3     Estimated Creatinine Clearance: 77 mL/min (based on SCr of 1.3 mg/dL). Intake/Output Summary (Last 24 hours) at 3/11/2020 0924  Last data filed at 3/11/2020 0809  Gross per 24 hour   Intake 3392.37 ml   Output 2555 ml   Net 837.37 ml       Pertinent Cultures:  Date    Source    Results  3/8   Blood    NGTD  3/8   Respiratory PCR  Negative   3/8   CSF         NGTD     3/8   Tracheal aspirate  Pending     Vancomycin level:   TROUGH:  No results for input(s): VANCOTROUGH in the last 72 hours. RANDOM:    Recent Labs     03/10/20  0505 03/11/20  0310   VANCORANDOM 9.3  (NOTE)  Therapeutic Range Interpretation: Please refer to current dosing   guidelines. 9.5  (NOTE)  Therapeutic Range Interpretation: Please refer to current dosing   guidelines.          Assessment:  · WBC and temperature: now WNL  · SCr, BUN, and urine output: JUSTIN improving   · Day(s) of therapy: #4  · Vancomycin level: 9.5, appropriate to re-dose    Plan:  · Previously intermittent dosing based on levels 2/2 JUSTIN   · Received vancomycin 1500 mg x 1 yesterday with a random concentration 24h post-dose therapeutic  · Now that renal function has returned to baseline proceed with scheduled vancomycin 1500 mg q18h x 2 doses  · Repeat level in 48h   · Pharmacy will continue to monitor patient and adjust therapy as indicated    Thank you for the consult,    Rayna Reynoso, PharmD, RPh

## 2020-03-11 NOTE — PROGRESS NOTES
Intake/Output Summary (Last 24 hours) at 3/11/2020 0948  Last data filed at 3/11/2020 0809  Gross per 24 hour   Intake 3392.37 ml   Output 2555 ml   Net 837.37 ml       Medications:   Scheduled Meds:   vancomycin  1,500 mg Intravenous Q18H    acyclovir  10 mg/kg (Ideal) Intravenous Q8H    sodium chloride flush  10 mL Intravenous 2 times per day    ampicillin-sulbactam  1.5 g Intravenous Q6H    vancomycin (VANCOCIN) intermittent dosing (placeholder)   Other See Admin Instructions    cefTRIAXone (ROCEPHIN) IV  2 g Intravenous Q12H    sodium chloride flush  10 mL Intravenous BID    sodium chloride flush  10 mL Intravenous 2 times per day    pantoprazole  40 mg Intravenous Daily    insulin lispro  0-12 Units Subcutaneous Q6H    chlorhexidine  15 mL Mouth/Throat BID    aspirin  81 mg Oral Daily    albuterol sulfate HFA  4 puff Inhalation Q4H WA    ipratropium  2 puff Inhalation Q4H WA      Infusions:   IV infusion builder      dextrose      DOPamine 5 mcg/kg/min (03/11/20 0514)    midazolam 6 mg/hr (03/11/20 0718)    fentanyl 75 mcg/hr (03/10/20 2016)      PRN Meds:  propylene glycol-glycerin, carboxymethylcellulose, sodium chloride flush, acetaminophen, glucose, dextrose, glucagon (rDNA), dextrose, sodium chloride flush, [DISCONTINUED] acetaminophen **OR** acetaminophen, calcium gluconate IVPB **OR** calcium gluconate IVPB **OR** calcium gluconate IVPB **OR** calcium gluconate IVPB, sodium phosphate IVPB **OR** sodium phosphate IVPB       Physical Exam:  Vitals:    03/11/20 0800   BP:    Pulse: (!) 42   Resp: 14   Temp:    SpO2: 96%        General: AAO, NAD  Chest: Nontender  Cardiac: First and Second Heart Sounds are Normal, No Murmurs or Gallops noted  Lungs:Clear to auscultation and percussion. Abdomen: Soft, NT, ND, +BS  Extremities: No clubbing, no edema  Vascular:  Equal 2+ peripheral pulses.         Lab Data:  CBC:   Recent Labs     03/09/20  0315 03/10/20  0505 03/11/20  0310   WBC 5.9 7.6 8. 3   HGB 12.8* 10.7* 10.3*   HCT 40.4* 34.8* 33.7*   MCV 93.1 95.3 94.7    232 255     BMP:   Recent Labs     03/08/20  1305  03/09/20  0315 03/10/20  0505 03/11/20  0310   *   < > 139 136 134*   K 3.9   < > 3.7 3.9 3.5      < > 101 101 103   CO2 18*   < > 21 21 22   PHOS 5.0*  --  6.0*  --   --    BUN 23  --  29* 36* 22   CREATININE 1.9*  --  2.0* 2.0* 1.3    < > = values in this interval not displayed. LIVER PROFILE:   Recent Labs     03/09/20  0315 03/10/20  0505 03/11/20  0310   AST 52* 31 32   ALT 29 21 20   BILITOT 0.8 0.4 0.2   ALKPHOS 114 99 93     PT/INR: No results for input(s): PROTIME, INR in the last 72 hours. APTT: No results for input(s): APTT in the last 72 hours. BNP:  No results for input(s): BNP in the last 72 hours.       Assessment:  Patient Active Problem List    Diagnosis Date Noted    Delirium     Metabolic encephalopathy     [facility]    Anxiety        Electronically signed by Tk Dai PA-C on 3/11/2020 at 9:48 AM

## 2020-03-11 NOTE — PLAN OF CARE
Nutrition Problem: Inadequate oral intake  Intervention: Food and/or Nutrient Delivery: Continue NPO  Nutritional Goals: pt will have a source of nutrition started by los day 7

## 2020-03-11 NOTE — PROGRESS NOTES
Discussed in detail events of today with Dr. Edgar Rueda and Dr. Guanakito Barger. No orders to wean off ventilator at this time, will continue present sedation and management as indicated.

## 2020-03-11 NOTE — CONSULTS
patient is sedated and intubated. The night prior to admission he was at his baseline. He drank a couple of beers, slept on the couch and rolled off the couch around 0100, his wife then found him confused and unable to communicate. She called the EMS. He then became agitated and combative after the EMS arrival.  In the field, they gave him Ketamine IM x 2 and intubated him before arrival to the ED. His family had stated he had been easily fatigued with dyspnea for the past 2 months and had c/o abdominal and flank pain for the past few days prior to admission. Upon workup, he was found to be in severe metabolic acidosis, LA was 19, AG 31, ammonia 470. CT of A&P showed impression of acute diverticulitis in the descending colon. He was fluid resuscitated and mechanically ventilated and admitted to the ICU. ID was consulted on 3/11/20. He has since developed JUSTIN, 2:1 HB, was found to have severe AR in need of an AVR. Also he underwent a LHC on 3/8 showing mild diffuse CAD. ? Infectious diseases service was consulted to evaluate the pt, and recommend further investigative and therapeutic measures. Review of Systems   Unable to perform ROS: Intubated       Patient Active Problem List    Diagnosis Date Noted    Metabolic encephalopathy     Sepsis ([facility] [address] 03/08/2020    Anxiety      Past Medical History:   Diagnosis Date    Anxiety       No past surgical history on file. Family History   Problem Relation Age of Onset    Coronary Art Dis Mother     Cancer Father         tongue    Diabetes Other         aunt      Infectious disease related family history - not contibutory.    SOCIAL HISTORY  Social History     Tobacco Use    Smoking status: Never Smoker    Smokeless tobacco: Current User     Types: Snuff   Substance Use Topics    Alcohol use: Yes     Comment: DAILY       Born:[place]   Lives:[place] with wife   Occupation:[employer]   No recent travel of significance.  No recent unusual exposures.  Pets: 3 dogs and a cat   ? ALLERGIES  No Known Allergies   MEDICATIONS  Reviewed and are per the chart/EMR. ? Antibiotics:   Present:  Vancomycin 3/11-  Acyclovir 3/11-  Ampicillin-sulbactam 3/9-    Past:  Cefepime 3/8  Zosyn 3/8-9  Ceftriaxone 3/9?  -------------------------------------------------------------------------------------------------------------------    Vital Signs:  Vitals:    03/11/20 0800   BP:    Pulse: (!) 42   Resp: 14   Temp:    SpO2: 96%         Exam:    VS: noted; wt 232 lb (105.4 kg) 5'10\" Height  Gen: Sedated and ventilated, no distress  Skin: no stigmata of endocarditis  HEMT: AT/NC Oropharynx pink, moist, and without lesions or exudates; dentition in good state of repair  Eyes: PERRLA, EOMI, conjunctiva pink, sclera anicteric. Neck: Supple. Trachea midline. No LAD. Chest: no distress and CTA. Good air movement. Oral ETT on mechanical vent. Heart: RRR and no MRG. Abd: soft, non-distended, no tenderness, no hepatomegaly. Normoactive bowel sounds. OG to LIS draining hendrix drainage. Ext: no clubbing, cyanosis, or edema  CVC:  Intact right groin without erythema, edema or drainage at site. Catheter Site: without erythema or tenderness draining clear yellow urine  Neuro: Sedated and ventilated. CN 2-12 intact and no focal sensory or motor deficits    ? Diagnostic Studies: reviewed  3/8/20 CTA Chest Abdomen and Pelvis:  Impression   Motion limited study.       No gross evidence of acute intrathoracic process.       Findings suggestive of acute uncomplicated diverticulitis of the distal   descending colon.  Otherwise no gross evidence of acute intra-process.       Small bilateral fat containing inguinal hernias. 3/8/20 XR Chest Portable:  Impression   1. Endotracheal tube tip 4.5 cm above the esa. 2. Gastric tube distal side port projects over the stomach.    3. Central pulmonary vascular congestion without overt pulmonary edema.   4. Mild cardiomegaly.         3/9/20 IR Lumbar Puncture:  Impression   Successful fluoroscopic-guided lumbar puncture. 3/10/20 XR Chest Portable:  Impression   No significant interval change. 3/11/20 XR Chest Portable:  Impression   1. Slightly increased right lung base pulmonary opacity may represent   atelectasis, pneumonia, and/or aspiration.       2. Unchanged left basilar atelectasis.       3. Unchanged appearance of the support hardware. I have examined this patient and available medical records on this date and have made the above observations, conclusions and recommendations. Electronically signed by: Electronically signed by Vito Galloway.  ROSALIA Floyd CNP on 3/11/2020 at 8:45 AM

## 2020-03-11 NOTE — PROGRESS NOTES
[address] [place] [address]  HOSPITALIST PROGRESS NOTE                       Name:  Abby Flores /Age/Sex: 1965  (62 y.o. male)   MRN & CSN:  [id number] & [id number] Admission Date/Time: 3/8/2020  3:44 AM   Location:  -A Attending:  Cristal James MD                                                  HPI  Abby Flores is a 62 y.o. male who presents with acute encephalopathy-intubated in the field    SUBJECTIVE  -on sedation, as per nurse patient gets really combative on sedation vacation   -on ventilator   -on dopamine  -making urine        ROS-unable to complete due to sedation      ALLERGIES: No Known Allergies    PCP: No primary care provider on file. PAST MEDICAL HISTORY, SURGICAL HISTORY, SOCIAL HISTORY and  HOME MEDICATIONS all reviewed. OBJECTIVE  Vitals:    20 0800 20 0802 20 0902 20 1145   BP:  (!) 119/42 (!) 112/43    Pulse: (!) 42 (!) 41 (!) 41 (!) 48   Resp: 14 14 14 13   Temp:       TempSrc:       SpO2: 96% 96% 95%    Weight:       Height:           PHYSICAL EXAM   GEN sedated male, on ventilator,   HENT NCAT. RESP ventilator  CARDIO/VASC S1/S2 auscultated. Occasional bradycardia  GI Abdomen is soft, nondistended  MSK No edema,  No gross joint deformities. SKIN Normal coloration, warm, dry. NEURO sedated    INTAKE: In: 3392.4 [I.V.:1782.4; NG/GT:60]  Out: 2345   OUTPUT: In: 3392.4   Out: 2345 [Urine:1950]    LABS  Recent Labs     03/09/20  0315 03/10/20  0505 20  0310   WBC 5.9 7.6 8.3   HGB 12.8* 10.7* 10.3*   HCT 40.4* 34.8* 33.7*    232 255      Recent Labs     03/09/20  0315 03/10/20  0505 20  0310    136 134*   K 3.7 3.9 3.5    101 103   CO2 21 21 22   PHOS 6.0*  --   --    BUN 29* 36* 22   CREATININE 2.0* 2.0* 1.3     Recent Labs     20  0315 03/10/20  0505 20  0310   AST 52* 31 32   ALT 29 21 20   BILITOT 0.8 0.4 0.2   ALKPHOS 114 99 93     No results for input(s): INR in the last 72 hours.   Recent Labs

## 2020-03-11 NOTE — PROGRESS NOTES
10-point system review unable to be completed due to intubated and sedated. Physical Exam:           Gen: intubated and sedated  HEENT: NC/AT, EOMI, PERRL, mmm, no carotid bruits, neck supple, no meningeal signs; Heart: RRR, S1S2  Lungs: CTAB, mechanically intubated. Ext: no edema, no calf tenderness b/l  Psych: TALHA  Skin: no rashes or lesions    NEUROLOGIC EXAM:    Mental Status: Does attempt to open eyes to voice. No following commands for me. Cranial Nerve Exam:   CN II-XII: pupils 3 mm equal and reactive, EOMI, +cough, + gag  Motor Exam:       No response to pain    Deep Tendon Reflexes: 2/4 biceps, triceps, brachioradialis, 1/4 patellar, and achilles b/l; flexor plantar responses b/l    Sensation: mild grimace to pain    Coordination/Cerebellum:       Tremors--none      Rapidly alternating movements: TALHA             Heel-to-Shin: TALHA      Finger-to-Nose: TALHA    Gait and stance:      Gait: deferred    Date:  When patient has the sedation off he does withdraw to pain in all 4 extremities open his eyes to voice does track voice side to side telling me EOMI present b/l. LABS:     Recent Labs     03/09/20  0315 03/10/20  0505 03/11/20  0310   WBC 5.9 7.6 8.3    136 134*   K 3.7 3.9 3.5    101 103   CO2 21 21 22   BUN 29* 36* 22   CREATININE 2.0* 2.0* 1.3   GLUCOSE 104* 106* 91     Results for Felipe Eller (MRN [id number]) as of 3/10/2020 16:35   Ref. Range 3/9/2020 16:15 3/9/2020 16:15   Glucose, CSF Latest Ref Range: 40 - 75 MG/DL  71. .. Protein, CSF Latest Ref Range: 15 - 45 MG/DL  41   RBC, CSF Latest Ref Range: 0 CU MM 28... (H) 41 (H)   WBC, CSF Latest Ref Range: 0 - 5 CU MM 6... (H) 2   Lymphs, CSF Latest Units: % 100 69   Other Cells, CSF Latest Units: CU MM OTHER CELL TYPE NOT SEEN    Other Cells, CSF Latest Units: % 0 31 MONOCYTE   Segs Latest Units: % 0 0       CSF culture pending  HSV PCR pending     IMAGING:    CT of head:   Motion limited study.       No gross evidence of acute intracranial abnormality. MRI brain:  No acute intracranial abnormality visualized. EEG: slow but no seizure       ASSESSMENT/PLAN:     1. Toxic/metabolic encephalopathy from acute infectious process, hyperammonemia, and hyperglycemia. Given leukocytosis, AMS, and fever will need to R/O meningitis/encephalitis. · MRI brain as above  · LP results as above awaiting HSV  · EEG slow secondary to sedation but no seizure   · Blood cultures pending  · Continue Acyclovir   · Will see tomorrow     Thank you for allowing us to participate in the care of your patient. If there are any questions regarding evaluation please feel free to contact us.      ROSALIA Lawton - ELAINE, 3/11/2020

## 2020-03-12 ENCOUNTER — APPOINTMENT (OUTPATIENT)
Dept: GENERAL RADIOLOGY | Age: 55
DRG: 853 | End: 2020-03-12
Payer: COMMERCIAL

## 2020-03-12 LAB
ALBUMIN SERPL-MCNC: 2.4 GM/DL (ref 3.4–5)
ALP BLD-CCNC: 107 IU/L (ref 40–128)
ALT SERPL-CCNC: 23 U/L (ref 10–40)
ANION GAP SERPL CALCULATED.3IONS-SCNC: 10 MMOL/L (ref 4–16)
AST SERPL-CCNC: 38 IU/L (ref 15–37)
BASE EXCESS MIXED: ABNORMAL (ref 0–1.2)
BASOPHILS ABSOLUTE: 0.1 K/CU MM
BASOPHILS RELATIVE PERCENT: 0.7 % (ref 0–1)
BILIRUB SERPL-MCNC: 0.2 MG/DL (ref 0–1)
BUN BLDV-MCNC: 14 MG/DL (ref 6–23)
CALCIUM IONIZED: 4.48 MG/DL (ref 4.48–5.28)
CALCIUM IONIZED: 4.64 MG/DL (ref 4.48–5.28)
CALCIUM SERPL-MCNC: 7.8 MG/DL (ref 8.3–10.6)
CARBON MONOXIDE, BLOOD: 1.6 % (ref 0–5)
CHLORIDE BLD-SCNC: 104 MMOL/L (ref 99–110)
CO2 CONTENT: 27.3 MMOL/L (ref 19–24)
CO2: 23 MMOL/L (ref 21–32)
COMMENT: ABNORMAL
CREAT SERPL-MCNC: 1.2 MG/DL (ref 0.9–1.3)
DIFFERENTIAL TYPE: ABNORMAL
EOSINOPHILS ABSOLUTE: 0.2 K/CU MM
EOSINOPHILS RELATIVE PERCENT: 3.5 % (ref 0–3)
GFR AFRICAN AMERICAN: >60 ML/MIN/1.73M2
GFR NON-AFRICAN AMERICAN: >60 ML/MIN/1.73M2
GLUCOSE BLD-MCNC: 108 MG/DL (ref 70–99)
GLUCOSE BLD-MCNC: 112 MG/DL (ref 70–99)
GLUCOSE BLD-MCNC: 78 MG/DL (ref 70–99)
GLUCOSE BLD-MCNC: 98 MG/DL (ref 70–99)
GLUCOSE BLD-MCNC: 99 MG/DL (ref 70–99)
HCO3 ARTERIAL: 26 MMOL/L (ref 18–23)
HCT VFR BLD CALC: 31.3 % (ref 42–52)
HEMOGLOBIN: 9.7 GM/DL (ref 13.5–18)
HIGH SENSITIVE C-REACTIVE PROTEIN: 120.7 MG/L
HIGH SENSITIVE C-REACTIVE PROTEIN: 384.1 MG/L
IMMATURE NEUTROPHIL %: 0.3 % (ref 0–0.43)
IONIZED CA: 1.12 MMOL/L (ref 1.12–1.32)
IONIZED CA: 1.16 MMOL/L (ref 1.12–1.32)
LACTATE: 0.7 MMOL/L (ref 0.4–2)
LYMPHOCYTES ABSOLUTE: 1.3 K/CU MM
LYMPHOCYTES RELATIVE PERCENT: 18.1 % (ref 24–44)
MAGNESIUM: 2.4 MG/DL (ref 1.8–2.4)
MCH RBC QN AUTO: 29.2 PG (ref 27–31)
MCHC RBC AUTO-ENTMCNC: 31 % (ref 32–36)
MCV RBC AUTO: 94.3 FL (ref 78–100)
METHEMOGLOBIN ARTERIAL: 0.8 %
MONOCYTES ABSOLUTE: 0.6 K/CU MM
MONOCYTES RELATIVE PERCENT: 9.2 % (ref 0–4)
NUCLEATED RBC %: 0 %
O2 SATURATION: 96.1 % (ref 96–97)
PCO2 ARTERIAL: 41 MMHG (ref 32–45)
PDW BLD-RTO: 15 % (ref 11.7–14.9)
PH BLOOD: 7.41 (ref 7.34–7.45)
PLATELET # BLD: 235 K/CU MM (ref 140–440)
PMV BLD AUTO: 10.3 FL (ref 7.5–11.1)
PO2 ARTERIAL: 93 MMHG (ref 75–100)
POTASSIUM SERPL-SCNC: 3.9 MMOL/L (ref 3.5–5.1)
PROCALCITONIN: 1.54
RBC # BLD: 3.32 M/CU MM (ref 4.6–6.2)
SEGMENTED NEUTROPHILS ABSOLUTE COUNT: 4.7 K/CU MM
SEGMENTED NEUTROPHILS RELATIVE PERCENT: 68.2 % (ref 36–66)
SODIUM BLD-SCNC: 137 MMOL/L (ref 135–145)
TOTAL CK: 1539 IU/L (ref 38–174)
TOTAL IMMATURE NEUTOROPHIL: 0.02 K/CU MM
TOTAL NUCLEATED RBC: 0 K/CU MM
TOTAL PROTEIN: 4.5 GM/DL (ref 6.4–8.2)
VDRL CSF SCREEN: NON REACTIVE
VDRL CSF SCREEN: NORMAL
WBC # BLD: 7 K/CU MM (ref 4–10.5)

## 2020-03-12 PROCEDURE — 2580000003 HC RX 258: Performed by: INTERNAL MEDICINE

## 2020-03-12 PROCEDURE — 6360000002 HC RX W HCPCS: Performed by: INTERNAL MEDICINE

## 2020-03-12 PROCEDURE — 83735 ASSAY OF MAGNESIUM: CPT

## 2020-03-12 PROCEDURE — 2580000003 HC RX 258: Performed by: HOSPITALIST

## 2020-03-12 PROCEDURE — 36592 COLLECT BLOOD FROM PICC: CPT

## 2020-03-12 PROCEDURE — 94761 N-INVAS EAR/PLS OXIMETRY MLT: CPT

## 2020-03-12 PROCEDURE — 6370000000 HC RX 637 (ALT 250 FOR IP): Performed by: INTERNAL MEDICINE

## 2020-03-12 PROCEDURE — 94750 HC PULMONARY COMPLIANCE STUDY: CPT

## 2020-03-12 PROCEDURE — 94003 VENT MGMT INPAT SUBQ DAY: CPT

## 2020-03-12 PROCEDURE — 6370000000 HC RX 637 (ALT 250 FOR IP): Performed by: HOSPITALIST

## 2020-03-12 PROCEDURE — 6360000002 HC RX W HCPCS: Performed by: HOSPITALIST

## 2020-03-12 PROCEDURE — 31720 CLEARANCE OF AIRWAYS: CPT

## 2020-03-12 PROCEDURE — 84145 PROCALCITONIN (PCT): CPT

## 2020-03-12 PROCEDURE — 80053 COMPREHEN METABOLIC PANEL: CPT

## 2020-03-12 PROCEDURE — 99233 SBSQ HOSP IP/OBS HIGH 50: CPT | Performed by: NURSE PRACTITIONER

## 2020-03-12 PROCEDURE — 82330 ASSAY OF CALCIUM: CPT

## 2020-03-12 PROCEDURE — 82962 GLUCOSE BLOOD TEST: CPT

## 2020-03-12 PROCEDURE — 2700000000 HC OXYGEN THERAPY PER DAY

## 2020-03-12 PROCEDURE — 6360000002 HC RX W HCPCS: Performed by: NURSE PRACTITIONER

## 2020-03-12 PROCEDURE — 2000000000 HC ICU R&B

## 2020-03-12 PROCEDURE — 83605 ASSAY OF LACTIC ACID: CPT

## 2020-03-12 PROCEDURE — 71045 X-RAY EXAM CHEST 1 VIEW: CPT

## 2020-03-12 PROCEDURE — 82803 BLOOD GASES ANY COMBINATION: CPT

## 2020-03-12 PROCEDURE — C9113 INJ PANTOPRAZOLE SODIUM, VIA: HCPCS | Performed by: INTERNAL MEDICINE

## 2020-03-12 PROCEDURE — 94640 AIRWAY INHALATION TREATMENT: CPT

## 2020-03-12 PROCEDURE — 89220 SPUTUM SPECIMEN COLLECTION: CPT

## 2020-03-12 PROCEDURE — 86141 C-REACTIVE PROTEIN HS: CPT

## 2020-03-12 PROCEDURE — 2500000003 HC RX 250 WO HCPCS: Performed by: INTERNAL MEDICINE

## 2020-03-12 PROCEDURE — 85025 COMPLETE CBC W/AUTO DIFF WBC: CPT

## 2020-03-12 PROCEDURE — 82550 ASSAY OF CK (CPK): CPT

## 2020-03-12 RX ORDER — POTASSIUM CHLORIDE AND SODIUM CHLORIDE 450; 150 MG/100ML; MG/100ML
INJECTION, SOLUTION INTRAVENOUS CONTINUOUS
Status: DISCONTINUED | OUTPATIENT
Start: 2020-03-12 | End: 2020-03-18

## 2020-03-12 RX ADMIN — SODIUM CHLORIDE, PRESERVATIVE FREE 10 ML: 5 INJECTION INTRAVENOUS at 10:08

## 2020-03-12 RX ADMIN — ALBUTEROL SULFATE 4 PUFF: 90 AEROSOL, METERED RESPIRATORY (INHALATION) at 16:40

## 2020-03-12 RX ADMIN — AMPICILLIN SODIUM AND SULBACTAM SODIUM 1.5 G: 1; .5 INJECTION, POWDER, FOR SOLUTION INTRAMUSCULAR; INTRAVENOUS at 17:05

## 2020-03-12 RX ADMIN — SODIUM CHLORIDE, PRESERVATIVE FREE 10 ML: 5 INJECTION INTRAVENOUS at 21:47

## 2020-03-12 RX ADMIN — ALBUTEROL SULFATE 4 PUFF: 90 AEROSOL, METERED RESPIRATORY (INHALATION) at 08:53

## 2020-03-12 RX ADMIN — Medication 2 PUFF: at 11:47

## 2020-03-12 RX ADMIN — MIDAZOLAM HYDROCHLORIDE 10 MG/HR: 5 INJECTION, SOLUTION INTRAMUSCULAR; INTRAVENOUS at 17:19

## 2020-03-12 RX ADMIN — Medication 2 PUFF: at 16:40

## 2020-03-12 RX ADMIN — MIDAZOLAM HYDROCHLORIDE 10 MG/HR: 5 INJECTION, SOLUTION INTRAMUSCULAR; INTRAVENOUS at 04:57

## 2020-03-12 RX ADMIN — PANTOPRAZOLE SODIUM 40 MG: 40 INJECTION, POWDER, FOR SOLUTION INTRAVENOUS at 10:07

## 2020-03-12 RX ADMIN — ENOXAPARIN SODIUM 30 MG: 30 INJECTION SUBCUTANEOUS at 10:07

## 2020-03-12 RX ADMIN — Medication 100 MCG/HR: at 08:39

## 2020-03-12 RX ADMIN — ACYCLOVIR SODIUM 730 MG: 50 INJECTION, SOLUTION INTRAVENOUS at 10:08

## 2020-03-12 RX ADMIN — ALBUTEROL SULFATE 4 PUFF: 90 AEROSOL, METERED RESPIRATORY (INHALATION) at 19:42

## 2020-03-12 RX ADMIN — AMPICILLIN SODIUM AND SULBACTAM SODIUM 1.5 G: 1; .5 INJECTION, POWDER, FOR SOLUTION INTRAMUSCULAR; INTRAVENOUS at 13:33

## 2020-03-12 RX ADMIN — Medication 125 MCG/HR: at 18:56

## 2020-03-12 RX ADMIN — Medication 2 PUFF: at 19:42

## 2020-03-12 RX ADMIN — POTASSIUM CHLORIDE AND SODIUM CHLORIDE: 450; 150 INJECTION, SOLUTION INTRAVENOUS at 21:47

## 2020-03-12 RX ADMIN — Medication 10 ML: at 21:35

## 2020-03-12 RX ADMIN — Medication 2 PUFF: at 08:52

## 2020-03-12 RX ADMIN — CALCIUM GLUCONATE 1 G: 98 INJECTION, SOLUTION INTRAVENOUS at 10:07

## 2020-03-12 RX ADMIN — CHLORHEXIDINE GLUCONATE 0.12% ORAL RINSE 15 ML: 1.2 LIQUID ORAL at 21:47

## 2020-03-12 RX ADMIN — CARBOXYMETHYLCELLULOSE SODIUM 1 DROP: 10 GEL OPHTHALMIC at 13:59

## 2020-03-12 RX ADMIN — CHLORHEXIDINE GLUCONATE 0.12% ORAL RINSE 15 ML: 1.2 LIQUID ORAL at 10:08

## 2020-03-12 RX ADMIN — ALBUTEROL SULFATE 4 PUFF: 90 AEROSOL, METERED RESPIRATORY (INHALATION) at 11:47

## 2020-03-12 RX ADMIN — AMPICILLIN SODIUM AND SULBACTAM SODIUM 1.5 G: 1; .5 INJECTION, POWDER, FOR SOLUTION INTRAMUSCULAR; INTRAVENOUS at 05:26

## 2020-03-12 RX ADMIN — ASPIRIN 81 MG 81 MG: 81 TABLET ORAL at 10:07

## 2020-03-12 RX ADMIN — VANCOMYCIN HYDROCHLORIDE 1500 MG: 5 INJECTION, POWDER, LYOPHILIZED, FOR SOLUTION INTRAVENOUS at 04:39

## 2020-03-12 RX ADMIN — ACYCLOVIR SODIUM 730 MG: 50 INJECTION, SOLUTION INTRAVENOUS at 00:41

## 2020-03-12 RX ADMIN — Medication 10 ML: at 10:09

## 2020-03-12 RX ADMIN — CARBOXYMETHYLCELLULOSE SODIUM 1 DROP: 10 GEL OPHTHALMIC at 22:17

## 2020-03-12 RX ADMIN — AMPICILLIN SODIUM AND SULBACTAM SODIUM 1.5 G: 1; .5 INJECTION, POWDER, FOR SOLUTION INTRAMUSCULAR; INTRAVENOUS at 00:41

## 2020-03-12 RX ADMIN — POTASSIUM CHLORIDE: 2 INJECTION, SOLUTION, CONCENTRATE INTRAVENOUS at 06:09

## 2020-03-12 ASSESSMENT — PAIN SCALES - GENERAL: PAINLEVEL_OUTOF10: 0

## 2020-03-12 ASSESSMENT — PULMONARY FUNCTION TESTS
PIF_VALUE: 16
PIF_VALUE: 20
PIF_VALUE: 17
PIF_VALUE: 16
PIF_VALUE: 16
PIF_VALUE: 20
PIF_VALUE: 17
PIF_VALUE: 18
PIF_VALUE: 15
PIF_VALUE: 15
PIF_VALUE: 16
PIF_VALUE: 15
PIF_VALUE: 15
PIF_VALUE: 16
PIF_VALUE: 15
PIF_VALUE: 17
PIF_VALUE: 17
PIF_VALUE: 16
PIF_VALUE: 20
PIF_VALUE: 12
PIF_VALUE: 17
PIF_VALUE: 15
PIF_VALUE: 16
PIF_VALUE: 10
PIF_VALUE: 16
PIF_VALUE: 16
PIF_VALUE: 13
PIF_VALUE: 16
PIF_VALUE: 9
PIF_VALUE: 16
PIF_VALUE: 17

## 2020-03-12 NOTE — PROGRESS NOTES
PERRL, mmm, no carotid bruits, neck supple, no meningeal signs; Heart: RRR, S1S2  Lungs: CTAB, mechanically intubated. Ext: no edema, no calf tenderness b/l  Psych: TALHA  Skin: no rashes or lesions    NEUROLOGIC EXAM:    Mental Status: Does attempt to open eyes to voice. No following commands for me. Cranial Nerve Exam:   CN II-XII: pupils 3 mm equal and reactive, EOMI, +cough, + gag  Motor Exam:       No response to pain    Deep Tendon Reflexes: 2/4 biceps, triceps, brachioradialis, 1/4 patellar, and achilles b/l; flexor plantar responses b/l    Sensation: mild grimace to pain    Coordination/Cerebellum:       Tremors--none      Rapidly alternating movements: TALHA             Heel-to-Shin: TALHA      Finger-to-Nose: TALHA    Gait and stance:      Gait: deferred    Date:  When patient has the sedation off he does withdraw to pain in all 4 extremities open his eyes to voice does track voice side to side telling me EOMI present b/l. LABS:     Recent Labs     03/10/20  0505 03/11/20  0310 03/12/20  0320   WBC 7.6 8.3 7.0    134* 137   K 3.9 3.5 3.9    103 104   CO2 21 22 23   BUN 36* 22 14   CREATININE 2.0* 1.3 1.2   GLUCOSE 106* 91 98     Results for Ellis Palomares (MRN [id number]) as of 3/10/2020 16:35   Ref. Range 3/9/2020 16:15 3/9/2020 16:15   Glucose, CSF Latest Ref Range: 40 - 75 MG/DL  71. .. Protein, CSF Latest Ref Range: 15 - 45 MG/DL  41   RBC, CSF Latest Ref Range: 0 CU MM 28... (H) 41 (H)   WBC, CSF Latest Ref Range: 0 - 5 CU MM 6... (H) 2   Lymphs, CSF Latest Units: % 100 69   Other Cells, CSF Latest Units: CU MM OTHER CELL TYPE NOT SEEN    Other Cells, CSF Latest Units: % 0 31 MONOCYTE   Segs Latest Units: % 0 0       CSF culture pending  HSV PCR non-reactive    IMAGING:    CT of head: Motion limited study.       No gross evidence of acute intracranial abnormality. MRI brain:  No acute intracranial abnormality visualized.     EEG: slow but no seizure       ASSESSMENT/PLAN:

## 2020-03-12 NOTE — PROGRESS NOTES
Nephrology Progress Note        [address], [address] [place] [address]  Phone: [phone]  Office Hours: 8:30AM - 4:30PM  Monday - Friday       [facility]  Angel Moscoso MD  [address], [place]  [address],  [address]  [facility], [place] [address]  PHONE: [phone]  FAX: [phone]    3/12/2020 8:14 AM  Subjective:   Admit Date: 3/8/2020  PCP: No primary care provider on file. Interval History: remains intubated  Making urine    Diet: Diet NPO Effective Now      Data:   Scheduled Meds:   ipratropium-albuterol  1 ampule Inhalation Q4H WA    enoxaparin  30 mg Subcutaneous Daily    acyclovir  10 mg/kg (Ideal) Intravenous Q8H    sodium chloride flush  10 mL Intravenous 2 times per day    ampicillin-sulbactam  1.5 g Intravenous Q6H    vancomycin (VANCOCIN) intermittent dosing (placeholder)   Other See Admin Instructions    sodium chloride flush  10 mL Intravenous BID    sodium chloride flush  10 mL Intravenous 2 times per day    pantoprazole  40 mg Intravenous Daily    insulin lispro  0-12 Units Subcutaneous Q6H    chlorhexidine  15 mL Mouth/Throat BID    aspirin  81 mg Oral Daily    albuterol sulfate HFA  4 puff Inhalation Q4H WA    ipratropium  2 puff Inhalation Q4H WA     Continuous Infusions:   IV infusion builder 50 mL/hr at 03/12/20 0609    dextrose      midazolam 10 mg/hr (03/12/20 0457)    fentanyl 100 mcg/hr (03/11/20 2155)     PRN Meds:LORazepam, ziprasidone, propylene glycol-glycerin, carboxymethylcellulose, sodium chloride flush, acetaminophen, glucose, dextrose, glucagon (rDNA), dextrose, sodium chloride flush, [DISCONTINUED] acetaminophen **OR** acetaminophen, calcium gluconate IVPB **OR** calcium gluconate IVPB **OR** calcium gluconate IVPB **OR** calcium gluconate IVPB, sodium phosphate IVPB **OR** sodium phosphate IVPB  I/O last 3 completed shifts:   In: 0364 [I.V.:4191; NG/GT:60; IV Piggyback:1300]  Out: 2668 [Urine:2700; Emesis/NG output:350]  No intake/output data recorded. Intake/Output Summary (Last 24 hours) at 3/12/2020 0814  Last data filed at 3/12/2020 0535  Gross per 24 hour   Intake 5490.99 ml   Output 2920 ml   Net 2570.99 ml       CBC:   Recent Labs     03/10/20  0505 03/11/20  0310 03/12/20  0320   WBC 7.6 8.3 7.0   HGB 10.7* 10.3* 9.7*    255 235       BMP:    Recent Labs     03/10/20  0505 03/11/20  0310 03/12/20  0320    134* 137   K 3.9 3.5 3.9    103 104   CO2 21 22 23   BUN 36* 22 14   CREATININE 2.0* 1.3 1.2   GLUCOSE 106* 91 98     Hepatic:   Recent Labs     03/10/20  0505 03/11/20  0310 03/12/20  0320   AST 31 32 38*   ALT 21 20 23   BILITOT 0.4 0.2 0.2   ALKPHOS 99 93 107     Troponin: No results for input(s): TROPONINI in the last 72 hours. BNP: No results for input(s): BNP in the last 72 hours. Lipids: No results for input(s): CHOL, HDL in the last 72 hours. Invalid input(s): LDLCALCU  ABGs:   Lab Results   Component Value Date    PO2ART 72 03/11/2020    BMF7HIT 41.0 03/11/2020     INR:   No results for input(s): INR in the last 72 hours. Objective:   Vitals: BP (!) 122/44   Pulse 84   Temp 98.8 °F (37.1 °C) (Rectal)   Resp 14   Ht 5' 10\" (1.778 m)   Wt 232 lb 5.8 oz (105.4 kg)   SpO2 97%   BMI 33.34 kg/m²   General appearance:  in no acute distress  HEENT: normocephalic, atraumatic,   Neck: supple, trachea midline  Lungs:breathing comfortably on mv  Heart[de-identified] regular rate and rhythm,  Abdomen: soft, non-tender; non distended,  Extremities: extremities atraumatic, no cyanosis or edema  Neurologic: sedated    Assessment and Plan:   - JUSTIN from ATN; - resolving creat normalizing- excellent output  - ANIOn gap metabolic acidosis:  from lactic aicdosis//Bicarb level of 9 on admission //Serum acetaminophen and salicylate, ethanol and BHA were negative.////nolrmal osmolal gap of -7//resolved with volume expansion  - Acute hypoxic resp failure  - ?? ??Polysusbstance abuse?: amphetamines positive as of now  - Hyperglycemia  - Leukocytosis/fevers  - Hyperammonemia  - bradycardia  - encephalopathy     Plan;  - cr improved to 1.2  -cont IVF at 50  K normal at 3.9  - monitor renal function while on acyclovir  - continue supportive mgmt  - please avoid nephrotoxins  - critically ill  Will follow christine                       Electronically signed by Tran Heart MD on 3/12/2020 at [address] [facility] [address],  [address]  [address], [place] [address]  PHONE: [phone]  FAX: [phone]

## 2020-03-12 NOTE — PROGRESS NOTES
CKTOTAL 1,539*          Abnormal labs for today noted      Imaging:     ECHO:    Microbiology:  Blood culture:    Urine culture:    Sputum culture:    Procedures done this admission:    MEDS  Scheduled Meds:   ipratropium-albuterol  1 ampule Inhalation Q4H WA    enoxaparin  30 mg Subcutaneous Daily    acyclovir  10 mg/kg (Ideal) Intravenous Q8H    sodium chloride flush  10 mL Intravenous 2 times per day    ampicillin-sulbactam  1.5 g Intravenous Q6H    vancomycin (VANCOCIN) intermittent dosing (placeholder)   Other See Admin Instructions    sodium chloride flush  10 mL Intravenous BID    sodium chloride flush  10 mL Intravenous 2 times per day    pantoprazole  40 mg Intravenous Daily    insulin lispro  0-12 Units Subcutaneous Q6H    chlorhexidine  15 mL Mouth/Throat BID    aspirin  81 mg Oral Daily    albuterol sulfate HFA  4 puff Inhalation Q4H WA    ipratropium  2 puff Inhalation Q4H WA     Continuous Infusions:   IV infusion builder 50 mL/hr at 03/12/20 0609    dextrose      midazolam 10 mg/hr (03/12/20 0457)    fentanyl 100 mcg/hr (03/12/20 0839)     PRN Meds:LORazepam, ziprasidone, propylene glycol-glycerin, carboxymethylcellulose, sodium chloride flush, acetaminophen, glucose, dextrose, glucagon (rDNA), dextrose, sodium chloride flush, [DISCONTINUED] acetaminophen **OR** acetaminophen, calcium gluconate IVPB **OR** calcium gluconate IVPB **OR** calcium gluconate IVPB **OR** calcium gluconate IVPB, sodium phosphate IVPB **OR** sodium phosphate IVPB        ASSESSMENT and PLAN  Hospital Day: 5    1-severe sepsis due to diverticulitis, pneumonia, aseptic meningitis  blood culture pending broad spectrum abx for now  May also have Viral meningitis. Stop Rocephin since CSF is not suggestive of bacterial meningitis. Respiratory culture: Strep pneumo.  -  HSV: Negative. Stop acyclovir. Continue vancomycin and Unasyn.   CT abdomen pelvis: Diverticulitis; new bibasilar consolidations; possible cirrhosis. Procalcitonin is improving. 2-Severe Anion gap metabolic acidosis-resolved  Acidosis has resolved. Nephrology following. Likely from elevated lactate. Patient received sodium bicarb. 3-Acute respiratory failure due to severe metabolic acidosis  CT chest non-acute, on the vent. pulm consulted. Pulmonology would like to attempt extubation on patient, however patient is requiring significant sedation due to agitation. Unable to use Precedex due to bradycardia. 4-Acute metabolic encephalopathy, Likely multifactorial from hyperammonemia, amphetamine use, acute kidney injury, sepsis. ammonia level:470, but quickly normalized. CT head non-acute. Neuro following. CSF shows slightly elevated WBCs. Possible aseptic meningitis. Patient still gets very agitated. Start PRN Ativan and Geodon. Agitation may be due to possible alcohol withdrawal.  EEG: Severe diffuse slowing of the background. MRI brain without contrast: No acute process. 5-Hyperglycemia  not diabetic as  hemoglobin A1C: normal    6-NSTEMI with arrythmia/2:1 heart block  cardio following  Cardiac cath: Mild CAD. Echo: 50-55%; severe eccentric aortic regurgitation. Aspirin.  -Off dopamine drip. Will likely need pacemaker once more stable. 7-? Alcoholism, CIWA once extubated. Possible withdrawal.  On Versed drip.  -Also added Ativan as needed. If true alcohol withdrawal then hopeful for improvement on day 5 or 6    8 acute kidney injury   Nephrology following. Creatinine appears to be stable. 9 hypocalcemia  Replacement protocol and recheck    10 rhabdomyolysis  Possible concern for seizure. On IV fluids. -CK improving. 11 severe aortic regurg  Will need valve surgery once stable. 12 possible aseptic meningitis  CSF showed 6 WBCs and 100% lymphocytes. CSF culture: No growth to date    13 anemia  - Check anemia panel.   Monitor hemoglobin.    -family reports he is not on any prescription medications, no known

## 2020-03-12 NOTE — PROGRESS NOTES
Nutrition Assessment    Type and Reason for Visit: Reassess    Nutrition Recommendations:   · Begin EN with immune enhancing formula (Pivot 1.5 Isaias) to a goal rate of 60 ml/hr to provide 2160 kcal and 135 gm protein    Nutrition Assessment: Spoke with RN, pt failed extubation attempt and MDs ready for EN. No propofol. Will order and send to unit. Malnutrition Assessment:  · Malnutrition Status:  At risk for malnutrition    Nutrition Risk Level: High    Nutrient Needs:  · Estimated Daily Total Kcal: 6779 based on britt state 2003b  · Estimated Daily Protein (g): 133-150 based on 1.5-2 g/kg/IBW  · Estimated Daily Total Fluid (ml/day): 2357 based on 1 mL/kcal    Nutrition Diagnosis:   · Problem: Inadequate oral intake  · Etiology: related to Impaired respiratory function-inability to consume food     Signs and symptoms:  as evidenced by NPO status due to medical condition, Intubation    Objective Information:  · Wound Type: None  · Current Nutrition Therapies:  · Oral Diet Orders: NPO   · Oral Diet intake: NPO  · Oral Nutrition Supplement (ONS) Orders: None  · ONS intake: NPO  · Anthropometric Measures:  · Ht: 5' 10\" (177.8 cm)   · Current Body Wt: 220 lb (99.8 kg)  · Admission Body Wt: 220 lb (99.8 kg)  · Usual Body Wt: 211 lb (95.7 kg)  · % Weight Change:   none noted  · Ideal Body Wt: 166 lb (75.3 kg), % Ideal Body 133%  · BMI Classification: BMI 30.0 - 34.9 Obese Class I    Nutrition Interventions:   Start Tube Feeding  Continued Inpatient Monitoring, Education not appropriate at this time, Coordination of Care    Nutrition Evaluation:   · Evaluation: Goals set   · Goals: Patient will meet at least 75% of estimated nutrient needs with enteral nutrition until appropriate for po diet  · Monitoring: TF Intake, TF Tolerance, Weight, Mental Status/Confusion, Pertinent Labs, Monitor Hemodynamic Status      Electronically signed by Faby Lamar RD, LD on 3/12/20 at 3:17 PM EDT    Contact Number: [phone]

## 2020-03-12 NOTE — PROGRESS NOTES
trended down    Ongoing Antimicrobial Therapy  Vancomycin 3/11-  Acyclovir 3/11-  Ampicillin-sulbactam 3/9-    Completed Antimicrobial Therapy  Cefepime 3/8  Zosyn 3/8-9  Ceftriaxone 3/9? Review of Systems   Unable to perform ROS: Intubated   Severe sepsis, streptococcus pneumoniae. Physical Exam:  Vital Signs: BP (!) 143/44   Pulse 87   Temp 98.4 °F (36.9 °C) (Rectal)   Resp 14   Ht 5' 10\" (1.778 m)   Wt 232 lb 5.8 oz (105.4 kg)   SpO2 96%   BMI 33.34 kg/m²     Gen: Sedated and ventilated, no distress  Skin: no stigmata of endocarditis  HEMT: AT/NC Oropharynx pink, moist, and without lesions or exudates; dentition in good state of repair  Eyes: PERRLA, EOMI, conjunctiva pink, sclera anicteric. Neck: Supple. Trachea midline. No LAD. Chest: no distress and CTA. Good air movement. Oral ETT on mechanical vent. Heart: RRR and no MRG. Abd: soft, non-distended, no tenderness, no hepatomegaly. Normoactive bowel sounds. OG to LIS draining hendrix drainage. Ext: no clubbing, cyanosis, or edema  CVC:  Intact right groin without erythema, edema or drainage at site. Catheter Site: without erythema or tenderness draining clear yellow urine  Neuro: Sedated and ventilated. CN 2-12 intact and no focal sensory or motor deficits     Radiologic / Imaging / TESTING  3/12/20 XR Chest Portable:  Impression   1. Endotracheal tube in satisfactory position above the esa   2.  Otherwise, stable chest demonstrating bibasilar hypoaeration          Labs:    Recent Results (from the past 24 hour(s))   POCT Glucose    Collection Time: 03/11/20  3:45 PM   Result Value Ref Range    POC Glucose 81 70 - 99 MG/DL   POCT Glucose    Collection Time: 03/11/20  9:11 PM   Result Value Ref Range    POC Glucose 63 (L) 70 - 99 MG/DL   POCT Glucose    Collection Time: 03/11/20  9:40 PM   Result Value Ref Range    POC Glucose 165 (H) 70 - 99 MG/DL   POCT Glucose    Collection Time: 03/12/20  3:18 AM   Result Value Ref Range    POC Glucose 112 (H) 70 - 99 MG/DL   CBC auto differential    Collection Time: 03/12/20  3:20 AM   Result Value Ref Range    WBC 7.0 4.0 - 10.5 K/CU MM    RBC 3.32 (L) 4.6 - 6.2 M/CU MM    Hemoglobin 9.7 (L) 13.5 - 18.0 GM/DL    Hematocrit 31.3 (L) 42 - 52 %    MCV 94.3 78 - 100 FL    MCH 29.2 27 - 31 PG    MCHC 31.0 (L) 32.0 - 36.0 %    RDW 15.0 (H) 11.7 - 14.9 %    Platelets 196 482 - 931 K/CU MM    MPV 10.3 7.5 - 11.1 FL    Differential Type AUTOMATED DIFFERENTIAL     Segs Relative 68.2 (H) 36 - 66 %    Lymphocytes % 18.1 (L) 24 - 44 %    Monocytes % 9.2 (H) 0 - 4 %    Eosinophils % 3.5 (H) 0 - 3 %    Basophils % 0.7 0 - 1 %    Segs Absolute 4.7 K/CU MM    Lymphocytes Absolute 1.3 K/CU MM    Monocytes Absolute 0.6 K/CU MM    Eosinophils Absolute 0.2 K/CU MM    Basophils Absolute 0.1 K/CU MM    Nucleated RBC % 0.0 %    Total Nucleated RBC 0.0 K/CU MM    Total Immature Neutrophil 0.02 K/CU MM    Immature Neutrophil % 0.3 0 - 0.43 %   Comprehensive Metabolic Panel    Collection Time: 03/12/20  3:20 AM   Result Value Ref Range    Sodium 137 135 - 145 MMOL/L    Potassium 3.9 3.5 - 5.1 MMOL/L    Chloride 104 99 - 110 mMol/L    CO2 23 21 - 32 MMOL/L    BUN 14 6 - 23 MG/DL    CREATININE 1.2 0.9 - 1.3 MG/DL    Glucose 98 70 - 99 MG/DL    Calcium 7.8 (L) 8.3 - 10.6 MG/DL    Alb 2.4 (L) 3.4 - 5.0 GM/DL    Total Protein 4.5 (L) 6.4 - 8.2 GM/DL    Total Bilirubin 0.2 0.0 - 1.0 MG/DL    ALT 23 10 - 40 U/L    AST 38 (H) 15 - 37 IU/L    Alkaline Phosphatase 107 40 - 128 IU/L    GFR Non-African American >60 >60 mL/min/1.73m2    GFR African American >60 >60 mL/min/1.73m2    Anion Gap 10 4 - 16   Magnesium    Collection Time: 03/12/20  3:20 AM   Result Value Ref Range    Magnesium 2.4 1.8 - 2.4 mg/dl   Lactic acid, plasma    Collection Time: 03/12/20  3:20 AM   Result Value Ref Range    Lactate 0.7 0.4 - 2.0 mMOL/L   Calcium, Ionized    Collection Time: 03/12/20  3:20 AM   Result Value Ref Range    Ionized Ca 1.12 1.12 - 1.32 mMOL/L

## 2020-03-12 NOTE — PROGRESS NOTES
Pulmonary and Critical Care  Progress Note    Subjective: The patient is agitated off sedation. ? etoh withdrawal.  Shortness of breath none  Chest pain none  Addressing respiratory complaints Patient is negative for  hemoptysis and cyanosis  CONSTITUTIONAL:  negative for fevers and chills      Past Medical History:     has a past medical history of Anxiety. has no past surgical history on file. reports that he has never smoked. His smokeless tobacco use includes snuff. He reports current alcohol use. He reports that he does not use drugs. Family history:  family history includes Cancer in his father; Coronary Art Dis in his mother; Diabetes in an other family member. No Known Allergies  Social History:    Reviewed; no changes    Objective:   PHYSICAL EXAM:        VITALS:  BP (!) 116/44   Pulse 82   Temp 98.8 °F (37.1 °C) (Rectal)   Resp 12   Ht 5' 10\" (1.778 m)   Wt 232 lb 5.8 oz (105.4 kg)   SpO2 98%   BMI 33.34 kg/m²     24HR INTAKE/OUTPUT:      Intake/Output Summary (Last 24 hours) at 3/12/2020 1038  Last data filed at 3/12/2020 0535  Gross per 24 hour   Intake 5490.99 ml   Output 2920 ml   Net 2570.99 ml       CONSTITUTIONAL:  Sedated on vent. LUNGS:  decreased breath sounds, occ basilar crackles. CARDIOVASCULAR:  normal S1 and S2 and negative JVD  ABD:Abdomen soft, non-tender. BS normal. No masses,  No organomegaly  NEURO:Sedated on vent.   DATA:    CBC:  Recent Labs     03/10/20  0505 03/11/20  0310 03/12/20  0320   WBC 7.6 8.3 7.0   RBC 3.65* 3.56* 3.32*   HGB 10.7* 10.3* 9.7*   HCT 34.8* 33.7* 31.3*    255 235   MCV 95.3 94.7 94.3   MCH 29.3 28.9 29.2   MCHC 30.7* 30.6* 31.0*   RDW 14.7 14.7 15.0*   SEGSPCT 73.1* 71.8* 68.2*      BMP:  Recent Labs     03/10/20  0505 03/11/20  0310 03/12/20  0320    134* 137   K 3.9 3.5 3.9    103 104   CO2 21 22 23   BUN 36* 22 14   CREATININE 2.0* 1.3 1.2   CALCIUM 7.4* 7.4* 7.8*   GLUCOSE 106* 91 98      ABG:  Recent Labs

## 2020-03-12 NOTE — PROGRESS NOTES
Daily Progress Note    Still on vent  Await extubation   He will need pacer once stable  He will need AVR once stable  Wait for his metabolic encephalopathy to improve   Supportive care  Off dopamine   Cath mild CAD    Pt. Sedated on vent this am  HR stable, 2:1 AV block in the 40s, BP stable     Sepsis    Secondary to acute diverticulitis, PNA, also concern for meningitis    LP done, on antivirals    On ABx    On vent    Acidosis has improved on vent      pulm following     AV block    Hemodynamic     Will need PPM    LHC done-mild disease noted    GINGER done for AI-Severe- will need AVR when stable    CTS has seen-planning for surgery when stable     Attempting to wean sedation and vent today  EEG nonspecific  MRI neg.   Will cont. To follow-await extubation       GINGER-3/9/20   Summary   Left ventricular systolic function is normal.   Ejection fraction is visually estimated at 50-55%.   Mildly dilated left ventricle.   Severe eccentric aortic regurgitation.   Possible fenestration and prolapse of the aortic valve.   Mild to moderate mitral regurgitation is present.  Shiloh Nino was no thrombus noted in the left atrial appendage.   Negative bubble study. No PFO or ASD noted.     [facility]-3/9/20  IMPRESSION:  1.  EDP is around between 25 and 30 mmHg present. 2.  Left main is patent. 3.  LAD, circ, ramus, and RCA all have mild coronary artery disease  present.     PAST MEDICAL HISTORY:  None.     PAST SURGICAL HISTORY:  None.     SOCIAL HISTORY:  He smokes and drinks.  Also there is history of having  drug abuse present and possibly some abuse still present.     ALLERGIES:  NKDA.       Objective:   BP (!) 116/44   Pulse 82   Temp 98.8 °F (37.1 °C) (Rectal)   Resp 12   Ht 5' 10\" (1.778 m)   Wt 232 lb 5.8 oz (105.4 kg)   SpO2 98%   BMI 33.34 kg/m²       Intake/Output Summary (Last 24 hours) at 3/12/2020 0905  Last data filed at 3/12/2020 0535  Gross per 24 hour   Intake 5490.99 ml   Output 2920 ml   Net 2570.99 ml Medications:   Scheduled Meds:   ipratropium-albuterol  1 ampule Inhalation Q4H WA    enoxaparin  30 mg Subcutaneous Daily    acyclovir  10 mg/kg (Ideal) Intravenous Q8H    sodium chloride flush  10 mL Intravenous 2 times per day    ampicillin-sulbactam  1.5 g Intravenous Q6H    vancomycin (VANCOCIN) intermittent dosing (placeholder)   Other See Admin Instructions    sodium chloride flush  10 mL Intravenous BID    sodium chloride flush  10 mL Intravenous 2 times per day    pantoprazole  40 mg Intravenous Daily    insulin lispro  0-12 Units Subcutaneous Q6H    chlorhexidine  15 mL Mouth/Throat BID    aspirin  81 mg Oral Daily    albuterol sulfate HFA  4 puff Inhalation Q4H WA    ipratropium  2 puff Inhalation Q4H WA      Infusions:   IV infusion builder 50 mL/hr at 03/12/20 0609    dextrose      midazolam 10 mg/hr (03/12/20 0457)    fentanyl 100 mcg/hr (03/12/20 0839)      PRN Meds:  LORazepam, ziprasidone, propylene glycol-glycerin, carboxymethylcellulose, sodium chloride flush, acetaminophen, glucose, dextrose, glucagon (rDNA), dextrose, sodium chloride flush, [DISCONTINUED] acetaminophen **OR** acetaminophen, calcium gluconate IVPB **OR** calcium gluconate IVPB **OR** calcium gluconate IVPB **OR** calcium gluconate IVPB, sodium phosphate IVPB **OR** sodium phosphate IVPB       Physical Exam:  Vitals:    03/12/20 0855   BP:    Pulse: 82   Resp: 12   Temp:    SpO2: 98%        General: AAO, NAD  Chest: Nontender  Cardiac: First and Second Heart Sounds are Normal, No Murmurs or Gallops noted  Lungs:Clear to auscultation and percussion. Abdomen: Soft, NT, ND, +BS  Extremities: No clubbing, no edema  Vascular:  Equal 2+ peripheral pulses.         Lab Data:  CBC:   Recent Labs     03/10/20  0505 03/11/20  0310 03/12/20  0320   WBC 7.6 8.3 7.0   HGB 10.7* 10.3* 9.7*   HCT 34.8* 33.7* 31.3*   MCV 95.3 94.7 94.3    255 235     BMP:   Recent Labs     03/10/20  0505 03/11/20  0310 03/12/20  0328

## 2020-03-12 NOTE — PROGRESS NOTES
[address] [facility]  consulted by Dr. Lety Lott for monitoring and adjustment. Indication for treatment: Severe sepsis  Goal trough: 15-20 mcg/mL  Other antimicrobials:  Zosyn     Pertinent Laboratory Values:   Temp Readings from Last 3 Encounters:   03/12/20 98.4 °F (36.9 °C) (Rectal)   01/07/20 97.7 °F (36.5 °C)     Recent Labs     03/10/20  0505 03/11/20  0310 03/12/20  0320   WBC 7.6 8.3 7.0   LACTATE 1.1 0.6 0.7     Recent Labs     03/10/20  0505 03/11/20  0310 03/12/20  0320   BUN 36* 22 14   CREATININE 2.0* 1.3 1.2     Estimated Creatinine Clearance: 84 mL/min (based on SCr of 1.2 mg/dL). Intake/Output Summary (Last 24 hours) at 3/12/2020 1711  Last data filed at 3/12/2020 1241  Gross per 24 hour   Intake 1400.66 ml   Output 2345 ml   Net -944.34 ml       Pertinent Cultures:  Date    Source    Results  3/8   Blood    NGTD  3/8   Respiratory PCR  Negative   3/8   CSF         NGTD     3/8   Tracheal aspirate  Strep Pneumo    Vancomycin level:   TROUGH:  No results for input(s): VANCOTROUGH in the last 72 hours. RANDOM:    Recent Labs     03/10/20  0505 03/11/20  0310   VANCORANDOM 9.3  (NOTE)  Therapeutic Range Interpretation: Please refer to current dosing   guidelines. 9.5  (NOTE)  Therapeutic Range Interpretation: Please refer to current dosing   guidelines. Assessment:  · WBC and temperature: now WNL; Afebrile.   · SCr, BUN, and urine output: JUSTIN improving   · Day(s) of therapy: #5  · Vancomycin level: 9.5, appropriate to re-dose    Plan:  · Previously intermittent dosing based on levels 2/2 JUSTIN   · Received vancomycin 1500 mg x 1 3/10 with a random concentration 24h post-dose therapeutic  · Now that renal function has returned to baseline continue scheduled vancomycin 1500 mg q18h x 2 doses  · Repeat level in 48h   · Pharmacy will continue to monitor patient and adjust therapy as indicated    Thank you for the consult,    Eliana Mcknight, PharmD, [facility]

## 2020-03-12 NOTE — PROGRESS NOTES
Dr. Hermilo Nova at bedside, requesting patient's sedation paused to evaluate if patient is able to wake up and follow commands for possible extubation. Patient's sedation paused for a total of 15 minutes. Patient arousable after 7 minutes, patient's s/o Tsering at bedside. Patient was not following any verbal commands by staff or spouse, did not recognize spouse, not making eye contact and lacking attentiveness during trial, thrashing and drawing legs up toward core, attempting to hit staff despite bilateral soft wrist restraints, biting down on ETT, RR 35-40 for approximately 10 minutes. Pt's sedation restarted. PC placed to [place] RT to advance ETT 1 cm per Dr. Hermilo Nova, once done will place order for CXR per Dr. Hermilo Nova.

## 2020-03-13 ENCOUNTER — APPOINTMENT (OUTPATIENT)
Dept: GENERAL RADIOLOGY | Age: 55
DRG: 853 | End: 2020-03-13
Payer: COMMERCIAL

## 2020-03-13 LAB
ALBUMIN SERPL-MCNC: 2.7 GM/DL (ref 3.4–5)
ALP BLD-CCNC: 142 IU/L (ref 40–128)
ALT SERPL-CCNC: 33 U/L (ref 10–40)
ANION GAP SERPL CALCULATED.3IONS-SCNC: 11 MMOL/L (ref 4–16)
ANISOCYTOSIS: ABNORMAL
AST SERPL-CCNC: 55 IU/L (ref 15–37)
BASE EXCESS: ABNORMAL (ref 0–3.3)
BASOPHILS ABSOLUTE: 0.1 K/CU MM
BASOPHILS RELATIVE PERCENT: 0.9 % (ref 0–1)
BILIRUB SERPL-MCNC: 0.2 MG/DL (ref 0–1)
BUN BLDV-MCNC: 14 MG/DL (ref 6–23)
CALCIUM SERPL-MCNC: 8.1 MG/DL (ref 8.3–10.6)
CARBON MONOXIDE, BLOOD: 2 % (ref 0–5)
CHLORIDE BLD-SCNC: 103 MMOL/L (ref 99–110)
CO2 CONTENT: 26.8 MMOL/L (ref 19–24)
CO2: 24 MMOL/L (ref 21–32)
COMMENT: ABNORMAL
CREAT SERPL-MCNC: 1.3 MG/DL (ref 0.9–1.3)
CULTURE: ABNORMAL
CULTURE: NORMAL
DIFFERENTIAL TYPE: ABNORMAL
DOSE AMOUNT: NORMAL
DOSE TIME: NORMAL
EOSINOPHILS ABSOLUTE: 0.2 K/CU MM
EOSINOPHILS RELATIVE PERCENT: 3.1 % (ref 0–3)
GFR AFRICAN AMERICAN: >60 ML/MIN/1.73M2
GFR NON-AFRICAN AMERICAN: 57 ML/MIN/1.73M2
GLUCOSE BLD-MCNC: 107 MG/DL (ref 70–99)
GLUCOSE BLD-MCNC: 109 MG/DL (ref 70–99)
GLUCOSE BLD-MCNC: 113 MG/DL (ref 70–99)
GLUCOSE BLD-MCNC: 87 MG/DL (ref 70–99)
GLUCOSE BLD-MCNC: 99 MG/DL (ref 70–99)
GRAM SMEAR: ABNORMAL
HCO3 ARTERIAL: 25.4 MMOL/L (ref 18–23)
HCT VFR BLD CALC: 32.6 % (ref 42–52)
HEMOGLOBIN: 10 GM/DL (ref 13.5–18)
IMMATURE NEUTROPHIL %: 0.7 % (ref 0–0.43)
LACTATE: 0.8 MMOL/L (ref 0.4–2)
LYMPHOCYTES ABSOLUTE: 1.6 K/CU MM
LYMPHOCYTES RELATIVE PERCENT: 21.2 % (ref 24–44)
Lab: ABNORMAL
Lab: ABNORMAL
Lab: NORMAL
MAGNESIUM: 1.9 MG/DL (ref 1.8–2.4)
MCH RBC QN AUTO: 29.7 PG (ref 27–31)
MCHC RBC AUTO-ENTMCNC: 30.7 % (ref 32–36)
MCV RBC AUTO: 96.7 FL (ref 78–100)
METHEMOGLOBIN ARTERIAL: 1 %
MONOCYTES ABSOLUTE: 0.7 K/CU MM
MONOCYTES RELATIVE PERCENT: 9.9 % (ref 0–4)
O2 SATURATION: 93.6 % (ref 96–97)
PCO2 ARTERIAL: 44 MMHG (ref 32–45)
PDW BLD-RTO: 15.2 % (ref 11.7–14.9)
PH BLOOD: 7.37 (ref 7.34–7.45)
PHOSPHORUS: 3.6 MG/DL (ref 2.5–4.9)
PLATELET # BLD: 251 K/CU MM (ref 140–440)
PMV BLD AUTO: 10.5 FL (ref 7.5–11.1)
PO2 ARTERIAL: 73 MMHG (ref 75–100)
POTASSIUM SERPL-SCNC: 4.2 MMOL/L (ref 3.5–5.1)
PRO-BNP: 722.7 PG/ML
PROCALCITONIN: 0.74
RBC # BLD: 3.37 M/CU MM (ref 4.6–6.2)
SEGMENTED NEUTROPHILS ABSOLUTE COUNT: 4.8 K/CU MM
SEGMENTED NEUTROPHILS RELATIVE PERCENT: 64.2 % (ref 36–66)
SODIUM BLD-SCNC: 138 MMOL/L (ref 135–145)
SPECIMEN: ABNORMAL
SPECIMEN: ABNORMAL
SPECIMEN: NORMAL
TOTAL IMMATURE NEUTOROPHIL: 0.05 K/CU MM
TOTAL PROTEIN: 4.8 GM/DL (ref 6.4–8.2)
VANCOMYCIN RANDOM: 9.3 UG/ML
VANCOMYCIN RANDOM: NORMAL UG/ML
WBC # BLD: 7.5 K/CU MM (ref 4–10.5)
WBC # BLD: ABNORMAL 10*3/UL

## 2020-03-13 PROCEDURE — 6360000002 HC RX W HCPCS: Performed by: HOSPITALIST

## 2020-03-13 PROCEDURE — 2580000003 HC RX 258: Performed by: INTERNAL MEDICINE

## 2020-03-13 PROCEDURE — 83880 ASSAY OF NATRIURETIC PEPTIDE: CPT

## 2020-03-13 PROCEDURE — 99233 SBSQ HOSP IP/OBS HIGH 50: CPT | Performed by: NURSE PRACTITIONER

## 2020-03-13 PROCEDURE — 71045 X-RAY EXAM CHEST 1 VIEW: CPT

## 2020-03-13 PROCEDURE — 6360000002 HC RX W HCPCS: Performed by: INTERNAL MEDICINE

## 2020-03-13 PROCEDURE — 2500000003 HC RX 250 WO HCPCS: Performed by: INTERNAL MEDICINE

## 2020-03-13 PROCEDURE — 80202 ASSAY OF VANCOMYCIN: CPT

## 2020-03-13 PROCEDURE — 82962 GLUCOSE BLOOD TEST: CPT

## 2020-03-13 PROCEDURE — 6370000000 HC RX 637 (ALT 250 FOR IP): Performed by: INTERNAL MEDICINE

## 2020-03-13 PROCEDURE — 6360000002 HC RX W HCPCS: Performed by: NURSE PRACTITIONER

## 2020-03-13 PROCEDURE — 85025 COMPLETE CBC W/AUTO DIFF WBC: CPT

## 2020-03-13 PROCEDURE — 94003 VENT MGMT INPAT SUBQ DAY: CPT

## 2020-03-13 PROCEDURE — 51702 INSERT TEMP BLADDER CATH: CPT

## 2020-03-13 PROCEDURE — 83735 ASSAY OF MAGNESIUM: CPT

## 2020-03-13 PROCEDURE — C9113 INJ PANTOPRAZOLE SODIUM, VIA: HCPCS | Performed by: INTERNAL MEDICINE

## 2020-03-13 PROCEDURE — 94640 AIRWAY INHALATION TREATMENT: CPT

## 2020-03-13 PROCEDURE — 84145 PROCALCITONIN (PCT): CPT

## 2020-03-13 PROCEDURE — 2000000000 HC ICU R&B

## 2020-03-13 PROCEDURE — 89220 SPUTUM SPECIMEN COLLECTION: CPT

## 2020-03-13 PROCEDURE — 84100 ASSAY OF PHOSPHORUS: CPT

## 2020-03-13 PROCEDURE — 80053 COMPREHEN METABOLIC PANEL: CPT

## 2020-03-13 PROCEDURE — 83605 ASSAY OF LACTIC ACID: CPT

## 2020-03-13 PROCEDURE — 2700000000 HC OXYGEN THERAPY PER DAY

## 2020-03-13 PROCEDURE — 36600 WITHDRAWAL OF ARTERIAL BLOOD: CPT

## 2020-03-13 PROCEDURE — 82803 BLOOD GASES ANY COMBINATION: CPT

## 2020-03-13 PROCEDURE — 94761 N-INVAS EAR/PLS OXIMETRY MLT: CPT

## 2020-03-13 PROCEDURE — 36592 COLLECT BLOOD FROM PICC: CPT

## 2020-03-13 PROCEDURE — 2580000003 HC RX 258: Performed by: HOSPITALIST

## 2020-03-13 RX ORDER — FUROSEMIDE 10 MG/ML
40 INJECTION INTRAMUSCULAR; INTRAVENOUS ONCE
Status: COMPLETED | OUTPATIENT
Start: 2020-03-13 | End: 2020-03-13

## 2020-03-13 RX ADMIN — Medication 2 PUFF: at 08:11

## 2020-03-13 RX ADMIN — SODIUM CHLORIDE, PRESERVATIVE FREE 10 ML: 5 INJECTION INTRAVENOUS at 18:12

## 2020-03-13 RX ADMIN — LORAZEPAM 1 MG: 2 INJECTION INTRAMUSCULAR; INTRAVENOUS at 09:52

## 2020-03-13 RX ADMIN — Medication 10 ML: at 08:57

## 2020-03-13 RX ADMIN — AMPICILLIN SODIUM AND SULBACTAM SODIUM 1.5 G: 1; .5 INJECTION, POWDER, FOR SOLUTION INTRAMUSCULAR; INTRAVENOUS at 05:46

## 2020-03-13 RX ADMIN — AMPICILLIN SODIUM AND SULBACTAM SODIUM 1.5 G: 1; .5 INJECTION, POWDER, FOR SOLUTION INTRAMUSCULAR; INTRAVENOUS at 11:47

## 2020-03-13 RX ADMIN — Medication 2 PUFF: at 16:11

## 2020-03-13 RX ADMIN — MIDAZOLAM HYDROCHLORIDE 10 MG/HR: 5 INJECTION, SOLUTION INTRAMUSCULAR; INTRAVENOUS at 13:38

## 2020-03-13 RX ADMIN — Medication 10 ML: at 21:31

## 2020-03-13 RX ADMIN — SODIUM CHLORIDE, PRESERVATIVE FREE 10 ML: 5 INJECTION INTRAVENOUS at 21:00

## 2020-03-13 RX ADMIN — Medication 2 PUFF: at 12:22

## 2020-03-13 RX ADMIN — Medication 125 MCG/HR: at 04:14

## 2020-03-13 RX ADMIN — ALBUTEROL SULFATE 4 PUFF: 90 AEROSOL, METERED RESPIRATORY (INHALATION) at 18:55

## 2020-03-13 RX ADMIN — AMPICILLIN SODIUM AND SULBACTAM SODIUM 1.5 G: 1; .5 INJECTION, POWDER, FOR SOLUTION INTRAMUSCULAR; INTRAVENOUS at 17:38

## 2020-03-13 RX ADMIN — CHLORHEXIDINE GLUCONATE 0.12% ORAL RINSE 15 ML: 1.2 LIQUID ORAL at 22:00

## 2020-03-13 RX ADMIN — SODIUM CHLORIDE, PRESERVATIVE FREE 10 ML: 5 INJECTION INTRAVENOUS at 02:41

## 2020-03-13 RX ADMIN — VANCOMYCIN HYDROCHLORIDE 1250 MG: 5 INJECTION, POWDER, LYOPHILIZED, FOR SOLUTION INTRAVENOUS at 14:14

## 2020-03-13 RX ADMIN — ALBUTEROL SULFATE 4 PUFF: 90 AEROSOL, METERED RESPIRATORY (INHALATION) at 12:22

## 2020-03-13 RX ADMIN — ALBUTEROL SULFATE 4 PUFF: 90 AEROSOL, METERED RESPIRATORY (INHALATION) at 08:11

## 2020-03-13 RX ADMIN — PANTOPRAZOLE SODIUM 40 MG: 40 INJECTION, POWDER, FOR SOLUTION INTRAVENOUS at 08:56

## 2020-03-13 RX ADMIN — MIDAZOLAM HYDROCHLORIDE 10 MG/HR: 5 INJECTION, SOLUTION INTRAMUSCULAR; INTRAVENOUS at 23:38

## 2020-03-13 RX ADMIN — FUROSEMIDE 40 MG: 10 INJECTION, SOLUTION INTRAMUSCULAR; INTRAVENOUS at 09:01

## 2020-03-13 RX ADMIN — CHLORHEXIDINE GLUCONATE 0.12% ORAL RINSE 15 ML: 1.2 LIQUID ORAL at 08:56

## 2020-03-13 RX ADMIN — ALBUTEROL SULFATE 4 PUFF: 90 AEROSOL, METERED RESPIRATORY (INHALATION) at 16:11

## 2020-03-13 RX ADMIN — Medication 125 MCG/HR: at 11:31

## 2020-03-13 RX ADMIN — SODIUM CHLORIDE, PRESERVATIVE FREE 10 ML: 5 INJECTION INTRAVENOUS at 08:57

## 2020-03-13 RX ADMIN — Medication 2 PUFF: at 18:55

## 2020-03-13 RX ADMIN — ASPIRIN 81 MG 81 MG: 81 TABLET ORAL at 08:56

## 2020-03-13 RX ADMIN — LORAZEPAM 1 MG: 2 INJECTION INTRAMUSCULAR; INTRAVENOUS at 02:40

## 2020-03-13 RX ADMIN — AMPICILLIN SODIUM AND SULBACTAM SODIUM 1.5 G: 1; .5 INJECTION, POWDER, FOR SOLUTION INTRAMUSCULAR; INTRAVENOUS at 00:00

## 2020-03-13 RX ADMIN — MIDAZOLAM HYDROCHLORIDE 10 MG/HR: 5 INJECTION, SOLUTION INTRAMUSCULAR; INTRAVENOUS at 04:16

## 2020-03-13 RX ADMIN — Medication 150 MCG/HR: at 17:36

## 2020-03-13 RX ADMIN — ENOXAPARIN SODIUM 30 MG: 30 INJECTION SUBCUTANEOUS at 08:56

## 2020-03-13 RX ADMIN — SODIUM CHLORIDE, PRESERVATIVE FREE 10 ML: 5 INJECTION INTRAVENOUS at 08:56

## 2020-03-13 RX ADMIN — SODIUM CHLORIDE, PRESERVATIVE FREE 10 ML: 5 INJECTION INTRAVENOUS at 21:30

## 2020-03-13 ASSESSMENT — PULMONARY FUNCTION TESTS
PIF_VALUE: 21
PIF_VALUE: 16
PIF_VALUE: 22
PIF_VALUE: 18
PIF_VALUE: 17
PIF_VALUE: 17
PIF_VALUE: 18
PIF_VALUE: 22
PIF_VALUE: 20

## 2020-03-13 ASSESSMENT — PAIN SCALES - GENERAL
PAINLEVEL_OUTOF10: 0
PAINLEVEL_OUTOF10: 6
PAINLEVEL_OUTOF10: 0

## 2020-03-13 ASSESSMENT — PAIN SCALES - WONG BAKER: WONGBAKER_NUMERICALRESPONSE: 0

## 2020-03-13 NOTE — PROGRESS NOTES
Nephrology Progress Note        [address], [address] [place] [address]  Phone: [phone]  Office Hours: 8:30AM - 4:30PM  Monday - Friday       [facility]  Lance Turk MD  [address], DO  [address],  [place] [address]  [facility], [place] [address]  PHONE: [phone]  FAX: [phone]    3/13/2020 1:24 PM  Subjective:   Admit Date: 3/8/2020  PCP: No primary care provider on file. Interval History:  intubated  Making urine    Diet: DIET TUBE FEED CONTINUOUS/CYCLIC NPO; Immune Enhancing (Pivot 1.5 Isaias); Nasoenteric; Continuous; 20; 60; 24  Dietary Nutrition Supplements: Snack (see comment)      Data:   Scheduled Meds:   vancomycin  1,250 mg Intravenous Q18H    ipratropium-albuterol  1 ampule Inhalation Q4H WA    enoxaparin  30 mg Subcutaneous Daily    sodium chloride flush  10 mL Intravenous 2 times per day    ampicillin-sulbactam  1.5 g Intravenous Q6H    sodium chloride flush  10 mL Intravenous BID    sodium chloride flush  10 mL Intravenous 2 times per day    pantoprazole  40 mg Intravenous Daily    insulin lispro  0-12 Units Subcutaneous Q6H    chlorhexidine  15 mL Mouth/Throat BID    aspirin  81 mg Oral Daily    albuterol sulfate HFA  4 puff Inhalation Q4H WA    ipratropium  2 puff Inhalation Q4H WA     Continuous Infusions:   0.45 % NaCl with KCl 20 mEq 50 mL/hr at 03/12/20 2147    dextrose      midazolam 10 mg/hr (03/13/20 0416)    fentanyl 150 mcg/hr (03/13/20 1303)     PRN Meds:LORazepam, ziprasidone, carboxymethylcellulose, sodium chloride flush, acetaminophen, glucose, dextrose, glucagon (rDNA), dextrose, sodium chloride flush, [DISCONTINUED] acetaminophen **OR** acetaminophen, calcium gluconate IVPB **OR** calcium gluconate IVPB **OR** calcium gluconate IVPB **OR** calcium gluconate IVPB, sodium phosphate IVPB **OR** sodium phosphate IVPB  I/O last 3 completed shifts:   In: 2684.2 [I.V.:2114.2; NG/GT:120; IV Piggyback:450]  Out: [facility] [[facility]:[id number]]  I/O this shift: In: 40 [I.V.:10; NG/GT:30]  Out: -     Intake/Output Summary (Last 24 hours) at 3/13/2020 1324  Last data filed at 3/13/2020 0856  Gross per 24 hour   Intake 2724.24 ml   Output 1350 ml   Net 1374.24 ml       CBC:   Recent Labs     03/11/20  0310 03/12/20  0320 03/13/20  0921   WBC 8.3 7.0 7.5   HGB 10.3* 9.7* 10.0*    235 251       BMP:    Recent Labs     03/11/20 0310 03/12/20  0320 03/13/20  0624   * 137 138   K 3.5 3.9 4.2    104 103   CO2 22 23 24   BUN 22 14 14   CREATININE 1.3 1.2 1.3   GLUCOSE 91 98 99     Hepatic:   Recent Labs     03/11/20 0310 03/12/20  0320 03/13/20  0624   AST 32 38* 55*   ALT 20 23 33   BILITOT 0.2 0.2 0.2   ALKPHOS 93 107 142*     Troponin: No results for input(s): TROPONINI in the last 72 hours. BNP: No results for input(s): BNP in the last 72 hours. Lipids: No results for input(s): CHOL, HDL in the last 72 hours. Invalid input(s): LDLCALCU  ABGs:   Lab Results   Component Value Date    PO2ART 73 03/13/2020    FXI2WYH 44.0 03/13/2020     INR:   No results for input(s): INR in the last 72 hours. Objective:   Vitals: BP (!) 169/52   Pulse 95   Temp 97.9 °F (36.6 °C) (Rectal)   Resp 20   Ht 5' 10\" (1.778 m)   Wt 234 lb 9.1 oz (106.4 kg)   SpO2 98%   BMI 33.66 kg/m²   General appearance:  in no acute distress  HEENT: normocephalic, atraumatic,   Neck: supple, trachea midline  Lungs:breathing comfortably on mv  Heart[de-identified] regular rate and rhythm,  Abdomen: soft, non-tender; non distended,  Extremities: extremities atraumatic, no cyanosis or edema  Neurologic: sedated    Assessment and Plan:   - JUSTIN from ATN; - resolving creat normalizing- excellent output  - ANIOn gap metabolic acidosis:  from lactic aicdosis//Bicarb level of 9 on admission //Serum acetaminophen and salicylate, ethanol and BHA were negative.////nolrmal osmolal gap of -7//resolved with volume expansion  - Acute hypoxic resp failure  - ?? ??Polysusbstance abuse?: amphetamines positive as of now  - Hyperglycemia  - Leukocytosis/fevers  - Hyperammonemia  - bradycardia  - encephalopathy     Plan;  - cr improved to 1.3  -cont IVF at 50  K normal at 4.2  - monitor renal function while on acyclovir  - continue supportive mgmt  - please avoid nephrotoxins  - critically ill  Will follow christine                       Electronically signed by Alannah Rene MD on 3/13/2020 at 1:24 PM    [address],  [place]  [facility], [place] [address]  PHONE: [phone]  FAX: [phone]

## 2020-03-13 NOTE — PLAN OF CARE
Problem: Falls - Risk of:  Goal: Will remain free from falls  Description: Will remain free from falls  Outcome: Ongoing  Goal: Absence of physical injury  Description: Absence of physical injury  Outcome: Ongoing     Problem: Restraint Use - Nonviolent/Non-Self-Destructive Behavior:  Goal: Absence of restraint indications  Description: Absence of restraint indications  Outcome: Ongoing  Goal: Absence of restraint-related injury  Description: Absence of restraint-related injury  Outcome: Ongoing     Problem: Nutrition  Goal: Optimal nutrition therapy  Outcome: Ongoing

## 2020-03-13 NOTE — PROGRESS NOTES
[address] [place] [address]  HOSPITALIST PROGRESS NOTE                       Name:  Missy Sarah /Age/Sex: 1965  (62 y.o. male)   MRN & CSN:  [id number] & [id number] Admission Date/Time: 3/8/2020  3:44 AM   Location:  -A Attending:  Quique Harrison MD                                                  HPI  Missy Sarah is a 62 y.o. male who presents with acute encephalopathy-intubated in the field    SUBJECTIVE  -on sedation, nurse states he becomes very agitated even while on sedation  -on ventilator   -making urine  -on TF, no BMs yet        ROS-unable to complete due to sedation      ALLERGIES: No Known Allergies    PCP: No primary care provider on file. PAST MEDICAL HISTORY, SURGICAL HISTORY, SOCIAL HISTORY and  HOME MEDICATIONS all reviewed. OBJECTIVE  Vitals:    20 0602 20 0702 20 0802 20 0827   BP: (!) 112/50 (!) 124/46  (!) 148/50   Pulse: 73 79 82 86   Resp: 14 14 10 14   Temp:    98.2 °F (36.8 °C)   TempSrc:    Rectal   SpO2: 96% 98% 98% 95%   Weight:    234 lb 9.1 oz (106.4 kg)   Height:           PHYSICAL EXAM   GEN sedated male, on ventilator,   HEENT NCAT. Dharmesh Apple left eyelid appears swollen and seems erythematous on inside  RESP Ventilator, some coarseness  CARDIO/VASC S1/S2 auscultated. Occasional bradycardia  GI Abdomen is soft, nondistended  MSK Trace edema in LE,  No gross joint deformities. SKIN Normal coloration, warm, dry.   NEURO sedated    INTAKE: In: 2724.2 [I.V.:2124.2; NG/GT:150]  Out: 1350   OUTPUT: In: 2724.2   Out: 1350 [ITAEA:9542]    LABS  Recent Labs     20  0310 20  0320 20  0921   WBC 8.3 7.0 7.5   HGB 10.3* 9.7* 10.0*   HCT 33.7* 31.3* 32.6*    235 251      Recent Labs     20  0310 20  0320 20  0624   * 137 138   K 3.5 3.9 4.2    104 103   CO2 22 23 24   PHOS  --   --  3.6   BUN 22 14 14   CREATININE 1.3 1.2 1.3     Recent Labs     20  0310 20  0320 20  0624   AST 32 38* 55*   ALT 20 23 33   BILITOT 0.2 0.2 0.2   ALKPHOS 93 107 142*     No results for input(s): INR in the last 72 hours. Recent Labs     03/12/20  0320   CKTOTAL 1,539*          Abnormal labs for today noted      Imaging:     ECHO:    Microbiology:  Blood culture:    Urine culture:    Sputum culture:    Procedures done this admission:    MEDS  Scheduled Meds:   ipratropium-albuterol  1 ampule Inhalation Q4H WA    enoxaparin  30 mg Subcutaneous Daily    sodium chloride flush  10 mL Intravenous 2 times per day    ampicillin-sulbactam  1.5 g Intravenous Q6H    vancomycin (VANCOCIN) intermittent dosing (placeholder)   Other See Admin Instructions    sodium chloride flush  10 mL Intravenous BID    sodium chloride flush  10 mL Intravenous 2 times per day    pantoprazole  40 mg Intravenous Daily    insulin lispro  0-12 Units Subcutaneous Q6H    chlorhexidine  15 mL Mouth/Throat BID    aspirin  81 mg Oral Daily    albuterol sulfate HFA  4 puff Inhalation Q4H WA    ipratropium  2 puff Inhalation Q4H WA     Continuous Infusions:   0.45 % NaCl with KCl 20 mEq 50 mL/hr at 03/12/20 2147    dextrose      midazolam 10 mg/hr (03/13/20 0416)    fentanyl 100 mcg/hr (03/13/20 0910)     PRN Meds:LORazepam, ziprasidone, carboxymethylcellulose, sodium chloride flush, acetaminophen, glucose, dextrose, glucagon (rDNA), dextrose, sodium chloride flush, [DISCONTINUED] acetaminophen **OR** acetaminophen, calcium gluconate IVPB **OR** calcium gluconate IVPB **OR** calcium gluconate IVPB **OR** calcium gluconate IVPB, sodium phosphate IVPB **OR** sodium phosphate IVPB        ASSESSMENT and [facility] Day: 6    1-severe sepsis due to diverticulitis, pneumonia, aseptic meningitis  Blood culture pending broad spectrum abx for now  May also have Viral meningitis. Stop Rocephin since CSF is not suggestive of bacterial meningitis. Respiratory culture: Strep pneumo. HSV: Negative. Stop acyclovir. Continue vancomycin and Unasyn.   CT abdomen pelvis: Diverticulitis; new bibasilar consolidations; possible cirrhosis. Procalcitonin is improving. 2-Severe Anion gap metabolic acidosis-resolved  Acidosis has resolved. Nephrology following. Likely from elevated lactate. Patient received sodium bicarb. 3-Acute respiratory failure due to severe metabolic acidosis  CT chest non-acute, on the vent. pulm consulted. Pulmonology would like to attempt extubation on patient, however patient is requiring significant sedation due to agitation. Unable to use Precedex due to bradycardia. -CXR: No acute process. 4-Acute metabolic encephalopathy, Likely multifactorial from hyperammonemia, amphetamine use, acute kidney injury, sepsis. Ammonia level:470, but quickly normalized. CT head non-acute. Neuro following. CSF shows slightly elevated WBCs. Possible aseptic meningitis. Patient still gets very agitated. Start PRN Ativan and Geodon. Agitation may be due to possible alcohol withdrawal.  EEG: Severe diffuse slowing of the background. MRI brain without contrast: No acute process. -  If agitation is from alcohol withdrawal then patient should start to show some behavioral improvement. Now that we are passing day 5.    5-Hyperglycemia  Not diabetic as  hemoglobin A1C: normal    6-NSTEMI with arrythmia/2:1 heart block  Cardio following  Cardiac cath: Mild CAD. Echo: 50-55%; severe eccentric aortic regurgitation. Aspirin. Off dopamine drip. Will likely need pacemaker once more stable. 7-? Alcoholism, CIWA once extubated. Possible withdrawal.  On Versed drip. Also added Ativan as needed. If true alcohol withdrawal then hopeful for improvement soon. 8 acute kidney injury   Nephrology following. Creatinine appears to be stable. 9 hypocalcemia  Replacement protocol and recheck    10 rhabdomyolysis  Possible concern for seizure. On IV fluids. CK improving. 11 severe aortic regurg  Will need valve surgery once stable.     12 possible

## 2020-03-13 NOTE — PLAN OF CARE
Problem: Falls - Risk of:  Goal: Will remain free from falls  Description: Will remain free from falls  3/13/2020 1255 by Janine Catalan RN  Outcome: Ongoing  3/13/2020 0904 by Janine Catalan RN  Outcome: Ongoing  Goal: Absence of physical injury  Description: Absence of physical injury  3/13/2020 1255 by Janine Catalan RN  Outcome: Ongoing  3/13/2020 0904 by Janine Catalan RN  Outcome: Ongoing     Problem: Restraint Use - Nonviolent/Non-Self-Destructive Behavior:  Goal: Absence of restraint indications  Description: Absence of restraint indications  3/13/2020 1255 by Janine Catalan RN  Outcome: Ongoing  3/13/2020 0904 by Janine Catalan RN  Outcome: Ongoing  Goal: Absence of restraint-related injury  Description: Absence of restraint-related injury  3/13/2020 1255 by Janine Catalan RN  Outcome: Ongoing  3/13/2020 0904 by Janine Catalan RN  Outcome: Ongoing     Problem: Nutrition  Goal: Optimal nutrition therapy  3/13/2020 1255 by Janine Catalan RN  Outcome: Ongoing  3/13/2020 0904 by Janine Catalan RN  Outcome: Ongoing

## 2020-03-13 NOTE — PROGRESS NOTES
[facility] [facility]  consulted by Dr. Angel Neumann for monitoring and adjustment. Indication for treatment: Severe sepsis  Goal trough: 15-20 mcg/mL  Other antimicrobials:  Zosyn     Pertinent Laboratory Values:   Temp Readings from Last 3 Encounters:   03/13/20 98.2 °F (36.8 °C) (Rectal)   01/07/20 97.7 °F (36.5 °C)     Recent Labs     03/11/20 0310 03/12/20 0320 03/13/20  0624 03/13/20  0921   WBC 8.3 7.0  --  7.5   LACTATE 0.6 0.7 0.8  --      Recent Labs     03/11/20 0310 03/12/20 0320 03/13/20  0624   BUN 22 14 14   CREATININE 1.3 1.2 1.3     Estimated Creatinine Clearance: 78 mL/min (based on SCr of 1.3 mg/dL). Intake/Output Summary (Last 24 hours) at 3/13/2020 1133  Last data filed at 3/13/2020 0856  Gross per 24 hour   Intake 2724.24 ml   Output 1925 ml   Net 799.24 ml       Pertinent Cultures:  Date    Source    Results  3/8   Blood    NGTD  3/8   Respiratory PCR  Negative   3/8   CSF         NGTD     3/8   Tracheal aspirate  Strep Pneumo    Vancomycin level:   TROUGH:  No results for input(s): VANCOTROUGH in the last 72 hours. RANDOM:    Recent Labs     03/11/20 0310 03/13/20  0921   VANCORANDOM 9.5  (NOTE)  Therapeutic Range Interpretation: Please refer to current dosing   guidelines. 9.3  (NOTE)  Therapeutic Range Interpretation: Please refer to current dosing   guidelines. Assessment:  · WBC and temperature: WBC WNL; Afebrile  · SCr, BUN, and urine output: BUN improving; Scr stable  · Day(s) of therapy: #6  · Vancomycin level: 9.3, appropriate to re-dose    Plan:  · Level due at midnight was not obtained. Level this AM was ~29 hours post-dose. · Based on PK evaluation, will continue vancomycin 1250 mg IVPB q18h which should result in a trough level ~16.5. · Plan to obtain a trough level: 03-15-20 @ 0000.   · Pharmacy will continue to monitor patient and adjust therapy as indicated    Thank you for the consult,    Cayetano Solis, PharmD, [facility]

## 2020-03-13 NOTE — PROGRESS NOTES
Pulmonary and Critical Care  Progress Note    Subjective: The patient is still agitated off sedation. ? etoh withdrawal.  Shortness of breath none. Chest pain none. Addressing respiratory complaints Patient is negative for  hemoptysis and cyanosis  CONSTITUTIONAL:  negative for fevers and chills      Past Medical History:     has a past medical history of Anxiety. has no past surgical history on file. reports that he has never smoked. His smokeless tobacco use includes snuff. He reports current alcohol use. He reports that he does not use drugs. Family history:  family history includes Cancer in his father; Coronary Art Dis in his mother; Diabetes in an other family member. No Known Allergies  Social History:    Reviewed; no changes    Objective:   PHYSICAL EXAM:        VITALS:  BP (!) 148/50   Pulse 86   Temp 98.2 °F (36.8 °C) (Rectal)   Resp 14   Ht 5' 10\" (1.778 m)   Wt 234 lb 9.1 oz (106.4 kg)   SpO2 95%   BMI 33.66 kg/m²     24HR INTAKE/OUTPUT:      Intake/Output Summary (Last 24 hours) at 3/13/2020 1029  Last data filed at 3/13/2020 0856  Gross per 24 hour   Intake 2724.24 ml   Output 1925 ml   Net 799.24 ml       CONSTITUTIONAL:  Sedated on vent. LUNGS:  decreased breath sounds, occ basilar crackles. CARDIOVASCULAR:  normal S1 and S2 and negative JVD  ABD:Abdomen soft, non-tender. BS normal. No masses,  No organomegaly  NEURO:Sedated on vent.   DATA:    CBC:  Recent Labs     03/11/20  0310 03/12/20  0320 03/13/20  0921   WBC 8.3 7.0 7.5   RBC 3.56* 3.32* 3.37*   HGB 10.3* 9.7* 10.0*   HCT 33.7* 31.3* 32.6*    235 251   MCV 94.7 94.3 96.7   MCH 28.9 29.2 29.7   MCHC 30.6* 31.0* 30.7*   RDW 14.7 15.0* 15.2*   SEGSPCT 71.8* 68.2*  --       BMP:  Recent Labs     03/11/20  0310 03/12/20  0320 03/13/20  0624   * 137 138   K 3.5 3.9 4.2    104 103   CO2 22 23 24   BUN 22 14 14   CREATININE 1.3 1.2 1.3   CALCIUM 7.4* 7.8* 8.1*   GLUCOSE 91 98 99      ABG:  Recent Labs 03/11/20  0700 03/12/20  0700 03/13/20  0700   PH 7.40 7.41 7.37   PO2ART 72* 93 73*   YGH0NVH 41.0 41.0 44.0   O2SAT 94.0* 96.1 93.6*     Lab Results   Component Value Date    PROBNP 722.7 (H) 03/13/2020     No results found for: [facility]    Radiology Review:  Pertinent images / reports were reviewed as a part of this visit. Assessment:     Patient Active Problem List   Diagnosis    Anxiety    Sepsis ([address]    Metabolic encephalopathy    Delirium    Hyperammonemia (HCC)    Hyperglycemia    Diverticulitis of colon    Acute respiratory failure with hypoxia ([address]       Plan:   1. Start slow weaning. 2. Discussed with the family.   Zenaida Miller MD  3/13/2020  10:29 AM

## 2020-03-13 NOTE — PROGRESS NOTES
Infectious Disease Progress Note  3/13/2020   Patient Name: Anahi Hebert : 1965   Impression  · Severe Sepsis, Streptococcus Pneumoniae:  ? Fever max 101.5, now afebrile >48 hours  ? Leukocytosis 19.4 initially, now down to 7.0  ? Sputum culture 3/8-Strep pneumoniae, Micrococcus Species, Gamma Strep  ? CSF culture 3/9-NGTD, Spinal Fluid analysis RBC 28, WBC 2, Lymphs 100, Segs 0  ? Blood Culture 3/8-(2 sets) 0/-NGTD  ? HSV PCR 3/10-Negative  ? RDP 3/8-Negative  ? Procalictonin -5.39, -8.03, 3/13-0.74  ? CRP 3/.1, 3/.7  ? Imaging: 3/13/20-CXR reviewed, layering bilateral pleural effusions . ? CT A&P 3/12-No perforation or drainable fluid collection, uncomplicated distal descending colonic diverticulitis, trace likely free fluid in the pelvis. New bibasilar dependent consolidations compatible with atelectasis vs pneumonia vs aspiration. Small bilateral pleural effusions. Possibility of cirrhosis. · Acute Hypoxic Respiratory Failure:  Pulmonology onboard, Dr. Johnella Gowers,  Intubated on  mechanical ventilation 3/8, attempted to wean 3/12- without success  ? AR:  CTS onboard, Dr. Roger Radford, will need LHC pending resolution of JUSTIN and resp failure to determine best timing for AVR  ? Diverticulitis:   ? JUSTIN from ATN: Nephrology onboard, Dr. Ayde Hightower, improving  ? Anion Gap Metabolic Acidosis:  resolved  ? Toxic/Metabolic Encehalopathy:  Neurology onboard, Dr. Brody Castellano, abnormal EEG, continuing to r/o meningitis/encephalitis. MRI brain no acute abnormality. HSV negative  ? Bradycardia:  Cardio onboard, Dr. Rohini Baker, 2:1 HB, plan for PPM once extubated, underwent LHC 3/8/20: EDP 25-30, left main patent, LAD, cir, ramus and RCA all mild CAD. TTE 3/9-EF 50-55%, trileaflet AV with severe AR.    · Multi-morbidity: per PMHx:  Anxiety, occasional alcohol use   Plan:  · Continue Unasyn, and vancomycin through 3/16, will reassess  · Procalcitonin has trended down to normal level  · Following with

## 2020-03-13 NOTE — PROGRESS NOTES
94.3    235     BMP:   Recent Labs     03/11/20  0310 03/12/20  0320 03/13/20  0624   * 137 138   K 3.5 3.9 4.2    104 103   CO2 22 23 24   PHOS  --   --  3.6   BUN 22 14 14   CREATININE 1.3 1.2 1.3     LIVER PROFILE:   Recent Labs     03/11/20  0310 03/12/20  0320 03/13/20  0624   AST 32 38* 55*   ALT 20 23 33   BILITOT 0.2 0.2 0.2   ALKPHOS 93 107 142*     PT/INR: No results for input(s): PROTIME, INR in the last 72 hours. APTT: No results for input(s): APTT in the last 72 hours. BNP:  No results for input(s): BNP in the last 72 hours.       Assessment:  Patient Active Problem List    Diagnosis Date Noted    Diverticulitis of colon     Acute respiratory failure with hypoxia ([address]     Delirium     Hyperammonemia ([address]     Hyperglycemia     Metabolic encephalopathy     Sepsis ([address] 03/08/2020    Anxiety        Electronically signed by Annika Pepe PA-C on 3/13/2020 at 8:47 AM

## 2020-03-14 ENCOUNTER — APPOINTMENT (OUTPATIENT)
Dept: GENERAL RADIOLOGY | Age: 55
DRG: 853 | End: 2020-03-14
Payer: COMMERCIAL

## 2020-03-14 LAB
ALBUMIN SERPL-MCNC: 2.7 GM/DL (ref 3.4–5)
ALP BLD-CCNC: 130 IU/L (ref 40–128)
ALT SERPL-CCNC: 34 U/L (ref 10–40)
ANION GAP SERPL CALCULATED.3IONS-SCNC: 10 MMOL/L (ref 4–16)
AST SERPL-CCNC: 46 IU/L (ref 15–37)
BASOPHILS ABSOLUTE: 0.1 K/CU MM
BASOPHILS RELATIVE PERCENT: 1.1 % (ref 0–1)
BILIRUB SERPL-MCNC: 0.2 MG/DL (ref 0–1)
BUN BLDV-MCNC: 15 MG/DL (ref 6–23)
CALCIUM SERPL-MCNC: 8.2 MG/DL (ref 8.3–10.6)
CHLORIDE BLD-SCNC: 103 MMOL/L (ref 99–110)
CO2: 26 MMOL/L (ref 21–32)
COMMENT: ABNORMAL
CREAT SERPL-MCNC: 1.1 MG/DL (ref 0.9–1.3)
DIFFERENTIAL TYPE: ABNORMAL
EOSINOPHILS ABSOLUTE: 0.2 K/CU MM
EOSINOPHILS RELATIVE PERCENT: 3 % (ref 0–3)
GFR AFRICAN AMERICAN: >60 ML/MIN/1.73M2
GFR NON-AFRICAN AMERICAN: >60 ML/MIN/1.73M2
GLUCOSE BLD-MCNC: 158 MG/DL (ref 70–99)
GLUCOSE BLD-MCNC: 96 MG/DL (ref 70–99)
GLUCOSE BLD-MCNC: 97 MG/DL (ref 70–99)
HCT VFR BLD CALC: 30.8 % (ref 42–52)
HEMOGLOBIN: 9.2 GM/DL (ref 13.5–18)
IMMATURE NEUTROPHIL %: 1.6 % (ref 0–0.43)
LACTATE: 0.8 MMOL/L (ref 0.4–2)
LYMPHOCYTES ABSOLUTE: 1.7 K/CU MM
LYMPHOCYTES RELATIVE PERCENT: 22.8 % (ref 24–44)
MAGNESIUM: 1.8 MG/DL (ref 1.8–2.4)
MCH RBC QN AUTO: 28.8 PG (ref 27–31)
MCHC RBC AUTO-ENTMCNC: 29.9 % (ref 32–36)
MCV RBC AUTO: 96.6 FL (ref 78–100)
MONOCYTES ABSOLUTE: 0.8 K/CU MM
MONOCYTES RELATIVE PERCENT: 10.8 % (ref 0–4)
PDW BLD-RTO: 15.1 % (ref 11.7–14.9)
PLATELET # BLD: 258 K/CU MM (ref 140–440)
PMV BLD AUTO: 10.4 FL (ref 7.5–11.1)
POTASSIUM SERPL-SCNC: 4.3 MMOL/L (ref 3.5–5.1)
RBC # BLD: 3.19 M/CU MM (ref 4.6–6.2)
SEGMENTED NEUTROPHILS ABSOLUTE COUNT: 4.5 K/CU MM
SEGMENTED NEUTROPHILS RELATIVE PERCENT: 60.7 % (ref 36–66)
SODIUM BLD-SCNC: 139 MMOL/L (ref 135–145)
TOTAL IMMATURE NEUTOROPHIL: 0.12 K/CU MM
TOTAL PROTEIN: 4.8 GM/DL (ref 6.4–8.2)
WBC # BLD: 7.4 K/CU MM (ref 4–10.5)

## 2020-03-14 PROCEDURE — 2500000003 HC RX 250 WO HCPCS: Performed by: INTERNAL MEDICINE

## 2020-03-14 PROCEDURE — 89220 SPUTUM SPECIMEN COLLECTION: CPT

## 2020-03-14 PROCEDURE — 94003 VENT MGMT INPAT SUBQ DAY: CPT

## 2020-03-14 PROCEDURE — 6370000000 HC RX 637 (ALT 250 FOR IP): Performed by: HOSPITALIST

## 2020-03-14 PROCEDURE — 2500000003 HC RX 250 WO HCPCS: Performed by: HOSPITALIST

## 2020-03-14 PROCEDURE — 6360000002 HC RX W HCPCS: Performed by: HOSPITALIST

## 2020-03-14 PROCEDURE — 6370000000 HC RX 637 (ALT 250 FOR IP): Performed by: INTERNAL MEDICINE

## 2020-03-14 PROCEDURE — 6360000002 HC RX W HCPCS: Performed by: INTERNAL MEDICINE

## 2020-03-14 PROCEDURE — 83605 ASSAY OF LACTIC ACID: CPT

## 2020-03-14 PROCEDURE — 2000000000 HC ICU R&B

## 2020-03-14 PROCEDURE — 2580000003 HC RX 258: Performed by: HOSPITALIST

## 2020-03-14 PROCEDURE — 2580000003 HC RX 258: Performed by: INTERNAL MEDICINE

## 2020-03-14 PROCEDURE — 71045 X-RAY EXAM CHEST 1 VIEW: CPT

## 2020-03-14 PROCEDURE — 94761 N-INVAS EAR/PLS OXIMETRY MLT: CPT

## 2020-03-14 PROCEDURE — 85025 COMPLETE CBC W/AUTO DIFF WBC: CPT

## 2020-03-14 PROCEDURE — 2700000000 HC OXYGEN THERAPY PER DAY

## 2020-03-14 PROCEDURE — 74018 RADEX ABDOMEN 1 VIEW: CPT

## 2020-03-14 PROCEDURE — 51702 INSERT TEMP BLADDER CATH: CPT

## 2020-03-14 PROCEDURE — 82962 GLUCOSE BLOOD TEST: CPT

## 2020-03-14 PROCEDURE — 83735 ASSAY OF MAGNESIUM: CPT

## 2020-03-14 PROCEDURE — 80053 COMPREHEN METABOLIC PANEL: CPT

## 2020-03-14 PROCEDURE — 94640 AIRWAY INHALATION TREATMENT: CPT

## 2020-03-14 PROCEDURE — 6360000002 HC RX W HCPCS: Performed by: NURSE PRACTITIONER

## 2020-03-14 PROCEDURE — C9113 INJ PANTOPRAZOLE SODIUM, VIA: HCPCS | Performed by: INTERNAL MEDICINE

## 2020-03-14 PROCEDURE — 36592 COLLECT BLOOD FROM PICC: CPT

## 2020-03-14 RX ORDER — QUETIAPINE FUMARATE 25 MG/1
25 TABLET, FILM COATED ORAL 2 TIMES DAILY
Status: DISCONTINUED | OUTPATIENT
Start: 2020-03-14 | End: 2020-03-19

## 2020-03-14 RX ORDER — DIAZEPAM 5 MG/ML
5 INJECTION, SOLUTION INTRAMUSCULAR; INTRAVENOUS ONCE
Status: COMPLETED | OUTPATIENT
Start: 2020-03-14 | End: 2020-03-15

## 2020-03-14 RX ORDER — QUETIAPINE FUMARATE 25 MG/1
25 TABLET, FILM COATED ORAL NIGHTLY
Status: DISCONTINUED | OUTPATIENT
Start: 2020-03-14 | End: 2020-03-14

## 2020-03-14 RX ORDER — FUROSEMIDE 10 MG/ML
20 INJECTION INTRAMUSCULAR; INTRAVENOUS 2 TIMES DAILY
Status: DISCONTINUED | OUTPATIENT
Start: 2020-03-14 | End: 2020-03-19

## 2020-03-14 RX ORDER — QUETIAPINE FUMARATE 25 MG/1
25 TABLET, FILM COATED ORAL 2 TIMES DAILY
Status: DISCONTINUED | OUTPATIENT
Start: 2020-03-14 | End: 2020-03-14

## 2020-03-14 RX ADMIN — AMPICILLIN SODIUM AND SULBACTAM SODIUM 1.5 G: 1; .5 INJECTION, POWDER, FOR SOLUTION INTRAMUSCULAR; INTRAVENOUS at 06:07

## 2020-03-14 RX ADMIN — METRONIDAZOLE 500 MG: 500 INJECTION, SOLUTION INTRAVENOUS at 22:53

## 2020-03-14 RX ADMIN — LORAZEPAM 1 MG: 2 INJECTION INTRAMUSCULAR; INTRAVENOUS at 20:30

## 2020-03-14 RX ADMIN — CEFTRIAXONE 1 G: 1 INJECTION, POWDER, FOR SOLUTION INTRAMUSCULAR; INTRAVENOUS at 21:04

## 2020-03-14 RX ADMIN — ALBUTEROL SULFATE 4 PUFF: 90 AEROSOL, METERED RESPIRATORY (INHALATION) at 07:10

## 2020-03-14 RX ADMIN — FUROSEMIDE 20 MG: 10 INJECTION, SOLUTION INTRAVENOUS at 11:38

## 2020-03-14 RX ADMIN — ZIPRASIDONE MESYLATE 10 MG: 20 INJECTION, POWDER, LYOPHILIZED, FOR SOLUTION INTRAMUSCULAR at 02:45

## 2020-03-14 RX ADMIN — SODIUM CHLORIDE, PRESERVATIVE FREE 10 ML: 5 INJECTION INTRAVENOUS at 21:00

## 2020-03-14 RX ADMIN — VANCOMYCIN HYDROCHLORIDE 1250 MG: 5 INJECTION, POWDER, LYOPHILIZED, FOR SOLUTION INTRAVENOUS at 06:10

## 2020-03-14 RX ADMIN — ZIPRASIDONE MESYLATE 10 MG: 20 INJECTION, POWDER, LYOPHILIZED, FOR SOLUTION INTRAMUSCULAR at 17:12

## 2020-03-14 RX ADMIN — FUROSEMIDE 20 MG: 10 INJECTION, SOLUTION INTRAVENOUS at 17:47

## 2020-03-14 RX ADMIN — POTASSIUM CHLORIDE AND SODIUM CHLORIDE: 450; 150 INJECTION, SOLUTION INTRAVENOUS at 00:11

## 2020-03-14 RX ADMIN — Medication 2 PUFF: at 15:10

## 2020-03-14 RX ADMIN — AMPICILLIN SODIUM AND SULBACTAM SODIUM 1.5 G: 1; .5 INJECTION, POWDER, FOR SOLUTION INTRAMUSCULAR; INTRAVENOUS at 17:47

## 2020-03-14 RX ADMIN — ENOXAPARIN SODIUM 30 MG: 30 INJECTION SUBCUTANEOUS at 11:38

## 2020-03-14 RX ADMIN — Medication 150 MCG/HR: at 21:37

## 2020-03-14 RX ADMIN — ALBUTEROL SULFATE 4 PUFF: 90 AEROSOL, METERED RESPIRATORY (INHALATION) at 15:10

## 2020-03-14 RX ADMIN — SODIUM CHLORIDE, PRESERVATIVE FREE 10 ML: 5 INJECTION INTRAVENOUS at 21:04

## 2020-03-14 RX ADMIN — MIDAZOLAM HYDROCHLORIDE 9 MG/HR: 5 INJECTION, SOLUTION INTRAMUSCULAR; INTRAVENOUS at 11:53

## 2020-03-14 RX ADMIN — SODIUM CHLORIDE, PRESERVATIVE FREE 10 ML: 5 INJECTION INTRAVENOUS at 11:43

## 2020-03-14 RX ADMIN — QUETIAPINE FUMARATE 25 MG: 25 TABLET ORAL at 22:53

## 2020-03-14 RX ADMIN — ZIPRASIDONE MESYLATE 10 MG: 20 INJECTION, POWDER, LYOPHILIZED, FOR SOLUTION INTRAMUSCULAR at 10:59

## 2020-03-14 RX ADMIN — Medication 10 ML: at 22:54

## 2020-03-14 RX ADMIN — PANTOPRAZOLE SODIUM 40 MG: 40 INJECTION, POWDER, FOR SOLUTION INTRAVENOUS at 11:44

## 2020-03-14 RX ADMIN — ALBUTEROL SULFATE 4 PUFF: 90 AEROSOL, METERED RESPIRATORY (INHALATION) at 20:10

## 2020-03-14 RX ADMIN — Medication 2 PUFF: at 11:25

## 2020-03-14 RX ADMIN — CARBOXYMETHYLCELLULOSE SODIUM 1 DROP: 10 GEL OPHTHALMIC at 22:56

## 2020-03-14 RX ADMIN — Medication 150 MCG/HR: at 00:12

## 2020-03-14 RX ADMIN — Medication 2 PUFF: at 07:12

## 2020-03-14 RX ADMIN — ASPIRIN 81 MG 81 MG: 81 TABLET ORAL at 11:38

## 2020-03-14 RX ADMIN — AMPICILLIN SODIUM AND SULBACTAM SODIUM 1.5 G: 1; .5 INJECTION, POWDER, FOR SOLUTION INTRAMUSCULAR; INTRAVENOUS at 00:11

## 2020-03-14 RX ADMIN — CHLORHEXIDINE GLUCONATE 0.12% ORAL RINSE 15 ML: 1.2 LIQUID ORAL at 11:38

## 2020-03-14 RX ADMIN — Medication 2 PUFF: at 20:10

## 2020-03-14 RX ADMIN — AMPICILLIN SODIUM AND SULBACTAM SODIUM 1.5 G: 1; .5 INJECTION, POWDER, FOR SOLUTION INTRAMUSCULAR; INTRAVENOUS at 11:40

## 2020-03-14 RX ADMIN — ALBUTEROL SULFATE 4 PUFF: 90 AEROSOL, METERED RESPIRATORY (INHALATION) at 11:25

## 2020-03-14 RX ADMIN — MIDAZOLAM HYDROCHLORIDE 9 MG/HR: 5 INJECTION, SOLUTION INTRAMUSCULAR; INTRAVENOUS at 22:48

## 2020-03-14 RX ADMIN — Medication 75 MCG/HR: at 14:08

## 2020-03-14 RX ADMIN — CHLORHEXIDINE GLUCONATE 0.12% ORAL RINSE 15 ML: 1.2 LIQUID ORAL at 22:53

## 2020-03-14 RX ADMIN — Medication 10 ML: at 11:43

## 2020-03-14 ASSESSMENT — PULMONARY FUNCTION TESTS
PIF_VALUE: 21
PIF_VALUE: 21
PIF_VALUE: 13
PIF_VALUE: 19
PIF_VALUE: 21
PIF_VALUE: 37
PIF_VALUE: 18
PIF_VALUE: 21
PIF_VALUE: 21
PIF_VALUE: 24
PIF_VALUE: 29
PIF_VALUE: 25
PIF_VALUE: 20
PIF_VALUE: 21
PIF_VALUE: 26
PIF_VALUE: 28
PIF_VALUE: 21
PIF_VALUE: 21
PIF_VALUE: 22
PIF_VALUE: 21

## 2020-03-14 ASSESSMENT — PAIN SCALES - GENERAL
PAINLEVEL_OUTOF10: 6
PAINLEVEL_OUTOF10: 8
PAINLEVEL_OUTOF10: 0
PAINLEVEL_OUTOF10: 1

## 2020-03-14 NOTE — PROGRESS NOTES
Patient very, very  Restless in bed.   Wiggled self around to get hold on OG tube and pull out of stomach/mouth

## 2020-03-14 NOTE — PROGRESS NOTES
Daily Progress Note    patient is  On vent   Heart rate is  2:1 block and severe AI-plan for pacer and AVR once stable  Await extubation   Supportive care  Not sure the etiology of respiratory failure   Cath negative --mild CAD   Supportive care     GINGER-3/9/20   Summary   Left ventricular systolic function is normal.   Ejection fraction is visually estimated at 50-55%.   Mildly dilated left ventricle.   Severe eccentric aortic regurgitation.   Possible fenestration and prolapse of the aortic valve.   Mild to moderate mitral regurgitation is present.  Octavia Haile was no thrombus noted in the left atrial appendage.   Negative bubble study. No PFO or ASD noted.     [facility]-3/9/20  IMPRESSION:  1.  EDP is around between 25 and 30 mmHg present. 2.  Left main is patent. 3.  LAD, circ, ramus, and RCA all have mild coronary artery disease  present.     PAST MEDICAL HISTORY:  None.     PAST SURGICAL HISTORY:  None.     SOCIAL HISTORY:  He smokes and drinks.  Also there is history of having  drug abuse present and possibly some abuse still present.     ALLERGIES:  NKDA.     Objective:   BP (!) 151/61   Pulse 77   Temp 98.8 °F (37.1 °C) (Rectal)   Resp 17   Ht (S) 5' 10\" (1.778 m)   Wt (S) 221 lb 9 oz (100.5 kg)   SpO2 (!) 89%   BMI 31.79 kg/m²       Intake/Output Summary (Last 24 hours) at 3/14/2020 1324  Last data filed at 3/14/2020 0700  Gross per 24 hour   Intake 3056.14 ml   Output 2750 ml   Net 306.14 ml       Medications:   Scheduled Meds:   furosemide  20 mg Intravenous BID    vancomycin  1,250 mg Intravenous Q18H    ipratropium-albuterol  1 ampule Inhalation Q4H WA    enoxaparin  30 mg Subcutaneous Daily    sodium chloride flush  10 mL Intravenous 2 times per day    ampicillin-sulbactam  1.5 g Intravenous Q6H    sodium chloride flush  10 mL Intravenous BID    sodium chloride flush  10 mL Intravenous 2 times per day    pantoprazole  40 mg Intravenous Daily    insulin lispro  0-12 Units Subcutaneous Q6H   

## 2020-03-14 NOTE — PROGRESS NOTES
resistant to penicillin. Stop Unasyn. Start Rocephin and add Flagyl. Continue vancomycin. Blood culture: No growth. 2-Severe Anion gap metabolic acidosis-resolved  Acidosis has resolved. Nephrology following. Likely from elevated lactate. Patient received sodium bicarb. 3-Acute respiratory failure due to pneumonia and now CHF  CT chest non-acute, on the vent. pulm consulted. Pulmonology would like to attempt extubation on patient, however patient is requiring significant sedation due to agitation. Unable to use Precedex due to bradycardia. -CXR: Congestive changes. Started on IV Lasix. Pulmonology to try SBT tomorrow. 4-Acute metabolic encephalopathy, Likely multifactorial from hyperammonemia, amphetamine use, acute kidney injury, sepsis. Ammonia level:470, but quickly normalized. CT head non-acute. Neuro following. CSF: slightly elevated WBCs. Possible aseptic meningitis. Very agitated. Start PRN Ativan and Geodon. Agitation may be due to possible alcohol withdrawal.  EEG: Severe diffuse slowing of the background. MRI brain without contrast: No acute process. If agitation is from alcohol withdrawal then patient should start to show some behavioral improvement now. -Start Seroquel twice daily to see if can help reduce combativeness. 5-Hyperglycemia  Not diabetic. hemoglobin A1C: normal    6-NSTEMI with arrythmia/2:1 heart block  Cardio following  Cardiac cath: Mild CAD. Echo: 50-55%; severe eccentric aortic regurgitation. Aspirin. Off dopamine drip. Will likely need pacemaker once more stable. 7-? Alcoholism, CIWA once extubated. Possible withdrawal.  On Versed drip. Also added Ativan as needed. If true alcohol withdrawal then hopeful for improvement soon. 8 acute kidney injury   Nephrology following. Creatinine appears to be stable. 9 hypocalcemia  Replacement protocol and recheck    10 rhabdomyolysis  Possible concern for seizure. On IV fluids. CK improving.     6 severe aortic regurg  Will need valve surgery once stable. 12 possible aseptic meningitis  CSF showed 6 WBCs and 100% lymphocytes. CSF culture: No growth to date    13 anemia  Monitor hemoglobin. - Slight decrease in hemoglobin. 14 eyelid swelling and irritation   Likely due to edema. Irritation likely due to patient being combative.  -Starting on Lasix.    -family reports he is not on any prescription medications, no known chronic medical issues.  -Updated wife on current status of patient. Diet DIET TUBE FEED CONTINUOUS/CYCLIC NPO; Immune Enhancing (Pivot 1.5 Isaias); Nasoenteric; Continuous; 20; 60; 24  Dietary Nutrition Supplements: Snack (see comment)   DVT Prophylaxis [x] Lovenox, []  Heparin, [] SCDs, [] Ambulation   GI Prophylaxis [] PPI,  [] H2 Blocker,  [] Carafate,  [] Diet/Tube Feeds   Code Status Full Code   Disposition Need mental status to improve.    CMS Level of Risk [] Low, [] Moderate,[x]  High  Patient's risk as above due to severe sepsis/metabolic acidosis     Dianelys Burger MD 3/14/2020 3:46 PM

## 2020-03-14 NOTE — PROGRESS NOTES
Patient thrashing around in bed, kicking legs over siderails, moving head from side to side, has popped vent tubing off a couple of times, did get a hold of OG and it was hound laying in the bed.   aleida POTTER passed by door and went to get geodon to give to patient

## 2020-03-14 NOTE — PLAN OF CARE
Pt unable to understand and demonstrate understanding.    Nursing to monitor and address needs as they arise

## 2020-03-14 NOTE — PROGRESS NOTES
Nephrology Progress Note        [address], [address] [address]  Phone: [phone]  Office Hours: 8:30AM - 4:30PM  Monday - Friday       [facility]  Angel Moscoso MD  [address]  [place],  [place]  [facility]  PHONE: [phone]  FAX: [phone]    3/14/2020 8:29 AM  Subjective:   Admit Date: 3/8/2020  PCP: No primary care provider on file. Interval History:  intubated  Urine output down  Edema plus  Patient awake    Diet: DIET TUBE FEED CONTINUOUS/CYCLIC NPO; Immune Enhancing (Pivot 1.5 Isaias);  Nasoenteric; Continuous; 20; 60; 24  Dietary Nutrition Supplements: Snack (see comment)      Data:   Scheduled Meds:   furosemide  20 mg Intravenous BID    vancomycin  1,250 mg Intravenous Q18H    ipratropium-albuterol  1 ampule Inhalation Q4H WA    enoxaparin  30 mg Subcutaneous Daily    sodium chloride flush  10 mL Intravenous 2 times per day    ampicillin-sulbactam  1.5 g Intravenous Q6H    sodium chloride flush  10 mL Intravenous BID    sodium chloride flush  10 mL Intravenous 2 times per day    pantoprazole  40 mg Intravenous Daily    insulin lispro  0-12 Units Subcutaneous Q6H    chlorhexidine  15 mL Mouth/Throat BID    aspirin  81 mg Oral Daily    albuterol sulfate HFA  4 puff Inhalation Q4H WA    ipratropium  2 puff Inhalation Q4H WA     Continuous Infusions:   0.45 % NaCl with KCl 20 mEq 50 mL/hr at 03/14/20 0011    dextrose      midazolam 9 mg/hr (03/14/20 0352)    fentanyl 75 mcg/hr (03/14/20 0553)     PRN Meds:LORazepam, ziprasidone, carboxymethylcellulose, sodium chloride flush, acetaminophen, glucose, dextrose, glucagon (rDNA), dextrose, sodium chloride flush, [DISCONTINUED] acetaminophen **OR** acetaminophen, calcium gluconate IVPB **OR** calcium gluconate IVPB **OR** calcium gluconate IVPB **OR** calcium gluconate IVPB, sodium phosphate IVPB **OR** sodium phosphate IVPB  I/O last 3 completed shifts: In: 3096.1 [I.V.:1662.1; NG/GT:945; IV Piggyback:489]  Out: 2750 [Urine:2750]  No intake/output data recorded. Intake/Output Summary (Last 24 hours) at 3/14/2020 0829  Last data filed at 3/14/2020 0700  Gross per 24 hour   Intake 3096.14 ml   Output 2750 ml   Net 346.14 ml       CBC:   Recent Labs     03/12/20  0320 03/13/20  0921 03/14/20  0601   WBC 7.0 7.5 7.4   HGB 9.7* 10.0* 9.2*    251 258       BMP:    Recent Labs     03/12/20  0320 03/13/20  0624 03/14/20  0601    138 139   K 3.9 4.2 4.3    103 103   CO2 23 24 26   BUN 14 14 15   CREATININE 1.2 1.3 1.1   GLUCOSE 98 99 96     Hepatic:   Recent Labs     03/12/20 0320 03/13/20  0624 03/14/20  0601   AST 38* 55* 46*   ALT 23 33 34   BILITOT 0.2 0.2 0.2   ALKPHOS 107 142* 130*     Troponin: No results for input(s): TROPONINI in the last 72 hours. BNP: No results for input(s): BNP in the last 72 hours. Lipids: No results for input(s): CHOL, HDL in the last 72 hours. Invalid input(s): LDLCALCU  ABGs:   Lab Results   Component Value Date    PO2ART 73 03/13/2020    ZOY9WAK 44.0 03/13/2020     INR:   No results for input(s): INR in the last 72 hours.     Objective:   Vitals: BP (!) 159/47   Pulse (!) 47   Temp 97.9 °F (36.6 °C) (Rectal)   Resp 10   Ht (S) 5' 10\" (1.778 m)   Wt (S) 221 lb 9 oz (100.5 kg)   SpO2 99%   BMI 31.79 kg/m²   General appearance:  in no acute distress  HEENT: normocephalic, atraumatic,   Neck: supple, trachea midline  Lungs:breathing comfortably on mv  Heart[de-identified] regular rate and rhythm,  Abdomen: soft, non-tender; non distended,  Extremities: edema plus  Neurologic: sedated    Assessment and Plan:   - JUSTIN from ATN; - resolving creat normalizing- excellent output  - ANIOn gap metabolic acidosis:  from lactic aicdosis//Bicarb level of 9 on admission //Serum acetaminophen and salicylate, ethanol and BHA were negative.////nolrmal osmolal gap of -7//resolved with volume expansion  - Acute hypoxic resp

## 2020-03-14 NOTE — PROGRESS NOTES
[address] [facility]  consulted by Dr. Robin Mac for monitoring and adjustment. Indication for treatment: Severe sepsis  Goal trough: 15-20 mcg/mL  Other antimicrobials:  Zosyn     Pertinent Laboratory Values:   Temp Readings from Last 3 Encounters:   03/14/20 98.8 °F (37.1 °C) (Rectal)   01/07/20 97.7 °F (36.5 °C)     Recent Labs     03/12/20  0320 03/13/20  0624 03/13/20  0921 03/14/20  0601   WBC 7.0  --  7.5 7.4   LACTATE 0.7 0.8  --  0.8     Recent Labs     03/12/20  0320 03/13/20  0624 03/14/20  0601   BUN 14 14 15   CREATININE 1.2 1.3 1.1     Estimated Creatinine Clearance: 89 mL/min (based on SCr of 1.1 mg/dL). Intake/Output Summary (Last 24 hours) at 3/14/2020 1353  Last data filed at 3/14/2020 0700  Gross per 24 hour   Intake 3056.14 ml   Output 2750 ml   Net 306.14 ml       Pertinent Cultures:  Date    Source    Results  3/8   Blood    NGTD  3/8   Respiratory PCR  Negative   3/8   CSF         NGTD     3/8   Tracheal aspirate  Strep Pneumo    Vancomycin level:   TROUGH:  No results for input(s): VANCOTROUGH in the last 72 hours. RANDOM:    Recent Labs     03/13/20  0921   VANCORANDOM 9.3  (NOTE)  Therapeutic Range Interpretation: Please refer to current dosing   guidelines. Assessment:  · WBC and temperature: WBC WNL; Afebrile  · SCr, BUN, and urine output: BUN improving; trending down  · Day(s) of therapy: 7  · Vancomycin level: to be collected     Plan:  · Based on PK evaluation, will continue vancomycin 1250 mg IVPB q18h which should result in a trough level ~16.5. · Plan to obtain a trough level: 03-15-20 @ 0000.   · Pharmacy will continue to monitor patient and adjust therapy as indicated    Thank you for the consult,  Kristen Guthrie [facility]  3/14/2020 1:55 PM

## 2020-03-14 NOTE — PROGRESS NOTES
Pulmonary and Critical Care  Progress Note    Subjective: The patient is still agitated off sedation. Shortness of breath none. Chest pain none. Addressing respiratory complaints Patient is negative for  hemoptysis and cyanosis  CONSTITUTIONAL:  negative for fevers and chills      Past Medical History:     has a past medical history of Anxiety. has no past surgical history on file. reports that he has never smoked. His smokeless tobacco use includes snuff. He reports current alcohol use. He reports that he does not use drugs. Family history:  family history includes Cancer in his father; Coronary Art Dis in his mother; Diabetes in an other family member. No Known Allergies  Social History:    Reviewed; no changes    Objective:   PHYSICAL EXAM:        VITALS:  BP (!) 151/61   Pulse 50   Temp 98.8 °F (37.1 °C) (Rectal)   Resp 22   Ht (S) 5' 10\" (1.778 m)   Wt (S) 221 lb 9 oz (100.5 kg)   SpO2 93%   BMI 31.79 kg/m²     24HR INTAKE/OUTPUT:      Intake/Output Summary (Last 24 hours) at 3/14/2020 1057  Last data filed at 3/14/2020 0700  Gross per 24 hour   Intake 3056.14 ml   Output 2750 ml   Net 306.14 ml       CONSTITUTIONAL:  Sedated on vent. LUNGS:  decreased breath sounds, occ basilar crackles. CARDIOVASCULAR:  normal S1 and S2 and negative JVD  ABD:Abdomen soft, non-tender. BS normal. No masses,  No organomegaly  NEURO:Sedated on vent.   DATA:    CBC:  Recent Labs     03/12/20  0320 03/13/20  0921 03/14/20  0601   WBC 7.0 7.5 7.4   RBC 3.32* 3.37* 3.19*   HGB 9.7* 10.0* 9.2*   HCT 31.3* 32.6* 30.8*    251 258   MCV 94.3 96.7 96.6   MCH 29.2 29.7 28.8   MCHC 31.0* 30.7* 29.9*   RDW 15.0* 15.2* 15.1*   SEGSPCT 68.2* 64.2 60.7      BMP:  Recent Labs     03/12/20  0320 03/13/20  0624 03/14/20  0601    138 139   K 3.9 4.2 4.3    103 103   CO2 23 24 26   BUN 14 14 15   CREATININE 1.2 1.3 1.1   CALCIUM 7.8* 8.1* 8.2*   GLUCOSE 98 99 96      ABG:  Recent Labs     03/12/20  0700 03/13/20  0700   PH 7.41 7.37   PO2ART 93 73*   PSI2RMD 41.0 44.0   O2SAT 96.1 93.6*     Lab Results   Component Value Date    PROBNP 722.7 (H) 03/13/2020     No results found for: [facility]    Radiology Review:  Pertinent images / reports were reviewed as a part of this visit. Assessment:     Patient Active Problem List   Diagnosis    Anxiety    Sepsis ([facility]    Metabolic encephalopathy    Delirium    Hyperammonemia (HCC)    Hyperglycemia    Diverticulitis of colon    Acute respiratory failure with hypoxia ([facility]       Plan:   1. Cont present vent support.   2. SAT/SBT in am.  Ortega Brink MD  3/14/2020  10:57 AM

## 2020-03-15 ENCOUNTER — APPOINTMENT (OUTPATIENT)
Dept: CT IMAGING | Age: 55
DRG: 853 | End: 2020-03-15
Payer: COMMERCIAL

## 2020-03-15 ENCOUNTER — APPOINTMENT (OUTPATIENT)
Dept: GENERAL RADIOLOGY | Age: 55
DRG: 853 | End: 2020-03-15
Payer: COMMERCIAL

## 2020-03-15 LAB
ALBUMIN SERPL-MCNC: 2.8 GM/DL (ref 3.4–5)
ALP BLD-CCNC: 123 IU/L (ref 40–128)
ALT SERPL-CCNC: 36 U/L (ref 10–40)
AMMONIA: 17 UMOL/L (ref 16–60)
ANION GAP SERPL CALCULATED.3IONS-SCNC: 13 MMOL/L (ref 4–16)
ANISOCYTOSIS: ABNORMAL
AST SERPL-CCNC: 47 IU/L (ref 15–37)
BANDED NEUTROPHILS ABSOLUTE COUNT: 0.91 K/CU MM
BANDED NEUTROPHILS RELATIVE PERCENT: 12 % (ref 5–11)
BASE EXCESS MIXED: ABNORMAL (ref 0–1.2)
BILIRUB SERPL-MCNC: 0.3 MG/DL (ref 0–1)
BUN BLDV-MCNC: 17 MG/DL (ref 6–23)
CALCIUM SERPL-MCNC: 8.3 MG/DL (ref 8.3–10.6)
CARBON MONOXIDE, BLOOD: 1.8 % (ref 0–5)
CHLORIDE BLD-SCNC: 103 MMOL/L (ref 99–110)
CO2 CONTENT: 30.6 MMOL/L (ref 19–24)
CO2: 23 MMOL/L (ref 21–32)
COMMENT: ABNORMAL
CREAT SERPL-MCNC: 1.2 MG/DL (ref 0.9–1.3)
CULTURE: NORMAL
DIFFERENTIAL TYPE: ABNORMAL
DOSE AMOUNT: NORMAL
DOSE TIME: NORMAL
GFR AFRICAN AMERICAN: >60 ML/MIN/1.73M2
GFR NON-AFRICAN AMERICAN: >60 ML/MIN/1.73M2
GLUCOSE BLD-MCNC: 63 MG/DL (ref 70–99)
GLUCOSE BLD-MCNC: 79 MG/DL (ref 70–99)
GLUCOSE BLD-MCNC: 82 MG/DL (ref 70–99)
GLUCOSE BLD-MCNC: 96 MG/DL (ref 70–99)
GRAM SMEAR: NORMAL
HCO3 ARTERIAL: 29.2 MMOL/L (ref 18–23)
HCT VFR BLD CALC: 32.3 % (ref 42–52)
HEMOGLOBIN: 9.6 GM/DL (ref 13.5–18)
LACTATE: 1.3 MMOL/L (ref 0.4–2)
LYMPHOCYTES ABSOLUTE: 0.8 K/CU MM
LYMPHOCYTES RELATIVE PERCENT: 11 % (ref 24–44)
Lab: NORMAL
MAGNESIUM: 1.6 MG/DL (ref 1.8–2.4)
MCH RBC QN AUTO: 29.3 PG (ref 27–31)
MCHC RBC AUTO-ENTMCNC: 29.7 % (ref 32–36)
MCV RBC AUTO: 98.5 FL (ref 78–100)
METHEMOGLOBIN ARTERIAL: 1 %
MONOCYTES ABSOLUTE: 0.7 K/CU MM
MONOCYTES RELATIVE PERCENT: 9 % (ref 0–4)
O2 SATURATION: 93.7 % (ref 96–97)
PCO2 ARTERIAL: 44 MMHG (ref 32–45)
PDW BLD-RTO: 15 % (ref 11.7–14.9)
PH BLOOD: 7.43 (ref 7.34–7.45)
PLATELET # BLD: 286 K/CU MM (ref 140–440)
PMV BLD AUTO: 10.1 FL (ref 7.5–11.1)
PO2 ARTERIAL: 72 MMHG (ref 75–100)
POTASSIUM SERPL-SCNC: 4.7 MMOL/L (ref 3.5–5.1)
PROCALCITONIN: 0.35
RBC # BLD: 3.28 M/CU MM (ref 4.6–6.2)
SEGMENTED NEUTROPHILS ABSOLUTE COUNT: 5.2 K/CU MM
SEGMENTED NEUTROPHILS RELATIVE PERCENT: 68 % (ref 36–66)
SODIUM BLD-SCNC: 139 MMOL/L (ref 135–145)
SPECIMEN: NORMAL
TOTAL PROTEIN: 4.9 GM/DL (ref 6.4–8.2)
VANCOMYCIN TROUGH: 11.3 UG/ML (ref 10–20)
WBC # BLD: 7.6 K/CU MM (ref 4–10.5)
WBC # BLD: ABNORMAL 10*3/UL

## 2020-03-15 PROCEDURE — 85007 BL SMEAR W/DIFF WBC COUNT: CPT

## 2020-03-15 PROCEDURE — 70450 CT HEAD/BRAIN W/O DYE: CPT

## 2020-03-15 PROCEDURE — 2580000003 HC RX 258: Performed by: INTERNAL MEDICINE

## 2020-03-15 PROCEDURE — 2500000003 HC RX 250 WO HCPCS: Performed by: INTERNAL MEDICINE

## 2020-03-15 PROCEDURE — 6370000000 HC RX 637 (ALT 250 FOR IP): Performed by: INTERNAL MEDICINE

## 2020-03-15 PROCEDURE — C9113 INJ PANTOPRAZOLE SODIUM, VIA: HCPCS | Performed by: INTERNAL MEDICINE

## 2020-03-15 PROCEDURE — 83605 ASSAY OF LACTIC ACID: CPT

## 2020-03-15 PROCEDURE — 6360000002 HC RX W HCPCS: Performed by: INTERNAL MEDICINE

## 2020-03-15 PROCEDURE — 85027 COMPLETE CBC AUTOMATED: CPT

## 2020-03-15 PROCEDURE — 84145 PROCALCITONIN (PCT): CPT

## 2020-03-15 PROCEDURE — 82962 GLUCOSE BLOOD TEST: CPT

## 2020-03-15 PROCEDURE — 94761 N-INVAS EAR/PLS OXIMETRY MLT: CPT

## 2020-03-15 PROCEDURE — 36592 COLLECT BLOOD FROM PICC: CPT

## 2020-03-15 PROCEDURE — 6360000002 HC RX W HCPCS: Performed by: NURSE PRACTITIONER

## 2020-03-15 PROCEDURE — 2580000003 HC RX 258: Performed by: HOSPITALIST

## 2020-03-15 PROCEDURE — 2700000000 HC OXYGEN THERAPY PER DAY

## 2020-03-15 PROCEDURE — 71045 X-RAY EXAM CHEST 1 VIEW: CPT

## 2020-03-15 PROCEDURE — 2500000003 HC RX 250 WO HCPCS: Performed by: HOSPITALIST

## 2020-03-15 PROCEDURE — 94640 AIRWAY INHALATION TREATMENT: CPT

## 2020-03-15 PROCEDURE — 94003 VENT MGMT INPAT SUBQ DAY: CPT

## 2020-03-15 PROCEDURE — 83735 ASSAY OF MAGNESIUM: CPT

## 2020-03-15 PROCEDURE — 6360000002 HC RX W HCPCS: Performed by: HOSPITALIST

## 2020-03-15 PROCEDURE — 80053 COMPREHEN METABOLIC PANEL: CPT

## 2020-03-15 PROCEDURE — 2000000000 HC ICU R&B

## 2020-03-15 PROCEDURE — 80202 ASSAY OF VANCOMYCIN: CPT

## 2020-03-15 PROCEDURE — 6370000000 HC RX 637 (ALT 250 FOR IP): Performed by: HOSPITALIST

## 2020-03-15 PROCEDURE — 82140 ASSAY OF AMMONIA: CPT

## 2020-03-15 PROCEDURE — 99233 SBSQ HOSP IP/OBS HIGH 50: CPT | Performed by: INTERNAL MEDICINE

## 2020-03-15 PROCEDURE — 82803 BLOOD GASES ANY COMBINATION: CPT

## 2020-03-15 PROCEDURE — 74018 RADEX ABDOMEN 1 VIEW: CPT

## 2020-03-15 RX ORDER — MAGNESIUM OXIDE 400 MG/1
400 TABLET ORAL 2 TIMES DAILY
Status: DISCONTINUED | OUTPATIENT
Start: 2020-03-15 | End: 2020-03-15 | Stop reason: CLARIF

## 2020-03-15 RX ORDER — MAGNESIUM SULFATE 1 G/100ML
1 INJECTION INTRAVENOUS PRN
Status: DISCONTINUED | OUTPATIENT
Start: 2020-03-15 | End: 2020-03-24 | Stop reason: HOSPADM

## 2020-03-15 RX ORDER — LANOLIN ALCOHOL/MO/W.PET/CERES
400 CREAM (GRAM) TOPICAL 2 TIMES DAILY
Status: DISCONTINUED | OUTPATIENT
Start: 2020-03-15 | End: 2020-03-24 | Stop reason: HOSPADM

## 2020-03-15 RX ORDER — LORAZEPAM 1 MG/1
1 TABLET ORAL 2 TIMES DAILY
Status: DISCONTINUED | OUTPATIENT
Start: 2020-03-15 | End: 2020-03-19

## 2020-03-15 RX ORDER — LORAZEPAM 2 MG/ML
3 INJECTION INTRAMUSCULAR ONCE
Status: COMPLETED | OUTPATIENT
Start: 2020-03-15 | End: 2020-03-15

## 2020-03-15 RX ORDER — MAGNESIUM SULFATE IN WATER 40 MG/ML
2 INJECTION, SOLUTION INTRAVENOUS ONCE
Status: COMPLETED | OUTPATIENT
Start: 2020-03-15 | End: 2020-03-15

## 2020-03-15 RX ORDER — IPRATROPIUM BROMIDE AND ALBUTEROL SULFATE 2.5; .5 MG/3ML; MG/3ML
1 SOLUTION RESPIRATORY (INHALATION) EVERY 4 HOURS PRN
Status: DISCONTINUED | OUTPATIENT
Start: 2020-03-15 | End: 2020-03-24 | Stop reason: HOSPADM

## 2020-03-15 RX ORDER — LORAZEPAM 2 MG/ML
2 INJECTION INTRAMUSCULAR EVERY 4 HOURS PRN
Status: DISCONTINUED | OUTPATIENT
Start: 2020-03-15 | End: 2020-03-19

## 2020-03-15 RX ADMIN — ASPIRIN 81 MG 81 MG: 81 TABLET ORAL at 15:45

## 2020-03-15 RX ADMIN — ALBUTEROL SULFATE 4 PUFF: 90 AEROSOL, METERED RESPIRATORY (INHALATION) at 20:48

## 2020-03-15 RX ADMIN — METRONIDAZOLE 500 MG: 500 INJECTION, SOLUTION INTRAVENOUS at 22:37

## 2020-03-15 RX ADMIN — MIDAZOLAM HYDROCHLORIDE 10 MG/HR: 5 INJECTION, SOLUTION INTRAMUSCULAR; INTRAVENOUS at 21:53

## 2020-03-15 RX ADMIN — MAGNESIUM SULFATE HEPTAHYDRATE 2 G: 40 INJECTION, SOLUTION INTRAVENOUS at 07:20

## 2020-03-15 RX ADMIN — SODIUM CHLORIDE, PRESERVATIVE FREE 10 ML: 5 INJECTION INTRAVENOUS at 07:19

## 2020-03-15 RX ADMIN — VANCOMYCIN HYDROCHLORIDE 1250 MG: 5 INJECTION, POWDER, LYOPHILIZED, FOR SOLUTION INTRAVENOUS at 02:20

## 2020-03-15 RX ADMIN — LORAZEPAM 3 MG: 2 INJECTION, SOLUTION INTRAMUSCULAR; INTRAVENOUS at 12:49

## 2020-03-15 RX ADMIN — DIAZEPAM 5 MG: 5 INJECTION, SOLUTION INTRAMUSCULAR; INTRAVENOUS at 00:15

## 2020-03-15 RX ADMIN — Medication 2 PUFF: at 15:10

## 2020-03-15 RX ADMIN — DEXTROSE 50 % IN WATER (D50W) INTRAVENOUS SYRINGE 12.5 G: at 17:14

## 2020-03-15 RX ADMIN — SODIUM CHLORIDE, PRESERVATIVE FREE 10 ML: 5 INJECTION INTRAVENOUS at 22:40

## 2020-03-15 RX ADMIN — Medication 2 PUFF: at 07:15

## 2020-03-15 RX ADMIN — CEFTRIAXONE 1 G: 1 INJECTION, POWDER, FOR SOLUTION INTRAMUSCULAR; INTRAVENOUS at 09:15

## 2020-03-15 RX ADMIN — ZIPRASIDONE MESYLATE 10 MG: 20 INJECTION, POWDER, LYOPHILIZED, FOR SOLUTION INTRAMUSCULAR at 00:30

## 2020-03-15 RX ADMIN — FUROSEMIDE 20 MG: 10 INJECTION, SOLUTION INTRAVENOUS at 18:10

## 2020-03-15 RX ADMIN — Medication 400 MG: at 15:44

## 2020-03-15 RX ADMIN — QUETIAPINE FUMARATE 25 MG: 25 TABLET ORAL at 15:45

## 2020-03-15 RX ADMIN — Medication 2 PUFF: at 11:15

## 2020-03-15 RX ADMIN — ENOXAPARIN SODIUM 30 MG: 30 INJECTION SUBCUTANEOUS at 08:31

## 2020-03-15 RX ADMIN — PANTOPRAZOLE SODIUM 40 MG: 40 INJECTION, POWDER, FOR SOLUTION INTRAVENOUS at 08:33

## 2020-03-15 RX ADMIN — ALBUTEROL SULFATE 4 PUFF: 90 AEROSOL, METERED RESPIRATORY (INHALATION) at 11:15

## 2020-03-15 RX ADMIN — Medication 10 ML: at 08:33

## 2020-03-15 RX ADMIN — LORAZEPAM 1 MG: 1 TABLET ORAL at 22:39

## 2020-03-15 RX ADMIN — CHLORHEXIDINE GLUCONATE 0.12% ORAL RINSE 15 ML: 1.2 LIQUID ORAL at 08:31

## 2020-03-15 RX ADMIN — Medication 2 PUFF: at 20:48

## 2020-03-15 RX ADMIN — ZIPRASIDONE MESYLATE 10 MG: 20 INJECTION, POWDER, LYOPHILIZED, FOR SOLUTION INTRAMUSCULAR at 07:30

## 2020-03-15 RX ADMIN — LORAZEPAM 1 MG: 1 TABLET ORAL at 15:45

## 2020-03-15 RX ADMIN — Medication 150 MCG/HR: at 05:30

## 2020-03-15 RX ADMIN — MIDAZOLAM HYDROCHLORIDE 10 MG/HR: 5 INJECTION, SOLUTION INTRAMUSCULAR; INTRAVENOUS at 10:51

## 2020-03-15 RX ADMIN — FUROSEMIDE 20 MG: 10 INJECTION, SOLUTION INTRAVENOUS at 08:31

## 2020-03-15 RX ADMIN — ALBUTEROL SULFATE 4 PUFF: 90 AEROSOL, METERED RESPIRATORY (INHALATION) at 15:10

## 2020-03-15 RX ADMIN — ALBUTEROL SULFATE 4 PUFF: 90 AEROSOL, METERED RESPIRATORY (INHALATION) at 07:15

## 2020-03-15 RX ADMIN — METRONIDAZOLE 500 MG: 500 INJECTION, SOLUTION INTRAVENOUS at 14:39

## 2020-03-15 RX ADMIN — QUETIAPINE FUMARATE 25 MG: 25 TABLET ORAL at 22:39

## 2020-03-15 RX ADMIN — LORAZEPAM 2 MG: 2 INJECTION INTRAMUSCULAR; INTRAVENOUS at 16:20

## 2020-03-15 RX ADMIN — CARBOXYMETHYLCELLULOSE SODIUM 1 DROP: 10 GEL OPHTHALMIC at 22:37

## 2020-03-15 RX ADMIN — Medication 175 MCG/HR: at 13:02

## 2020-03-15 RX ADMIN — CHLORHEXIDINE GLUCONATE 0.12% ORAL RINSE 15 ML: 1.2 LIQUID ORAL at 22:40

## 2020-03-15 RX ADMIN — Medication 175 MCG/HR: at 18:27

## 2020-03-15 RX ADMIN — SODIUM CHLORIDE, PRESERVATIVE FREE 10 ML: 5 INJECTION INTRAVENOUS at 08:32

## 2020-03-15 RX ADMIN — ZIPRASIDONE MESYLATE 10 MG: 20 INJECTION, POWDER, LYOPHILIZED, FOR SOLUTION INTRAMUSCULAR at 12:49

## 2020-03-15 RX ADMIN — Medication 400 MG: at 22:40

## 2020-03-15 RX ADMIN — ZIPRASIDONE MESYLATE 10 MG: 20 INJECTION, POWDER, LYOPHILIZED, FOR SOLUTION INTRAMUSCULAR at 22:38

## 2020-03-15 RX ADMIN — VANCOMYCIN HYDROCHLORIDE 1250 MG: 5 INJECTION, POWDER, LYOPHILIZED, FOR SOLUTION INTRAVENOUS at 18:09

## 2020-03-15 RX ADMIN — METRONIDAZOLE 500 MG: 500 INJECTION, SOLUTION INTRAVENOUS at 05:43

## 2020-03-15 ASSESSMENT — PULMONARY FUNCTION TESTS
PIF_VALUE: 18
PIF_VALUE: 19
PIF_VALUE: 21
PIF_VALUE: 20
PIF_VALUE: 22
PIF_VALUE: 34
PIF_VALUE: 23
PIF_VALUE: 21
PIF_VALUE: 21
PIF_VALUE: 20
PIF_VALUE: 26
PIF_VALUE: 20
PIF_VALUE: 24
PIF_VALUE: 20
PIF_VALUE: 22

## 2020-03-15 ASSESSMENT — PAIN SCALES - GENERAL
PAINLEVEL_OUTOF10: 0

## 2020-03-15 NOTE — PROGRESS NOTES
Nephrology Progress Note        [address], [address] [facility], [place] [address]  Phone: [phone]  Office Hours: 8:30AM - 4:30PM  Monday - Friday       [facility]  Rachel Hernandez MD  [address],   [address],  [place]  [facility]  PHONE: [phone]  FAX: [phone]    3/15/2020 10:14 AM  Subjective:   Admit Date: 3/8/2020  PCP: No primary care provider on file. Interval History:  intubated  Urine output better  Edema better      Diet: DIET TUBE FEED CONTINUOUS/CYCLIC NPO; Immune Enhancing (Pivot 1.5 Isaias);  Nasoenteric; Continuous; 20; 60; 24  Dietary Nutrition Supplements: Snack (see comment)      Data:   Scheduled Meds:   furosemide  20 mg Intravenous BID    QUEtiapine  25 mg Per NG tube BID    cefTRIAXone (ROCEPHIN) IV  1 g Intravenous Daily    metroNIDAZOLE  500 mg Intravenous Q8H    vancomycin  1,250 mg Intravenous Q18H    enoxaparin  30 mg Subcutaneous Daily    sodium chloride flush  10 mL Intravenous 2 times per day    sodium chloride flush  10 mL Intravenous BID    sodium chloride flush  10 mL Intravenous 2 times per day    pantoprazole  40 mg Intravenous Daily    insulin lispro  0-12 Units Subcutaneous Q6H    chlorhexidine  15 mL Mouth/Throat BID    aspirin  81 mg Oral Daily    albuterol sulfate HFA  4 puff Inhalation Q4H WA    ipratropium  2 puff Inhalation Q4H WA     Continuous Infusions:   0.45 % NaCl with KCl 20 mEq 10 mL/hr at 03/14/20 0918    dextrose      midazolam 9 mg/hr (03/14/20 2248)    fentanyl 150 mcg/hr (03/15/20 0530)     PRN Meds:ipratropium-albuterol, LORazepam, ziprasidone, carboxymethylcellulose, sodium chloride flush, acetaminophen, glucose, dextrose, glucagon (rDNA), dextrose, sodium chloride flush, [DISCONTINUED] acetaminophen **OR** acetaminophen, calcium gluconate IVPB **OR** calcium gluconate IVPB **OR** calcium gluconate IVPB **OR** calcium gluconate IVPB, sodium phosphate IVPB **OR** sodium phosphate IVPB  I/O last 3 completed shifts: In: 1372.9 [I.V.:1277.8; IV Piggyback:95.1]  Out: 1350 [Urine:1350]  I/O this shift:  In: 10 [I.V.:10]  Out: -     Intake/Output Summary (Last 24 hours) at 3/15/2020 1014  Last data filed at 3/15/2020 0719  Gross per 24 hour   Intake 1382.87 ml   Output 1350 ml   Net 32.87 ml       CBC:   Recent Labs     03/13/20  0921 03/14/20  0601 03/15/20  0220   WBC 7.5 7.4 7.6   HGB 10.0* 9.2* 9.6*    258 286       BMP:    Recent Labs     03/13/20  0624 03/14/20  0601 03/15/20  0220    139 139   K 4.2 4.3 4.7    103 103   CO2 24 26 23   BUN 14 15 17   CREATININE 1.3 1.1 1.2   GLUCOSE 99 96 96     Hepatic:   Recent Labs     03/13/20  0624 03/14/20  0601 03/15/20  0220   AST 55* 46* 47*   ALT 33 34 36   BILITOT 0.2 0.2 0.3   ALKPHOS 142* 130* 123     Troponin: No results for input(s): TROPONINI in the last 72 hours. BNP: No results for input(s): BNP in the last 72 hours. Lipids: No results for input(s): CHOL, HDL in the last 72 hours. Invalid input(s): LDLCALCU  ABGs:   Lab Results   Component Value Date    PO2ART 73 03/13/2020    PXK4JML 44.0 03/13/2020     INR:   No results for input(s): INR in the last 72 hours.     Objective:   Vitals: BP (!) 100/36   Pulse 74   Temp 98.2 °F (36.8 °C) (Rectal)   Resp 13   Ht (S) 5' 10\" (1.778 m)   Wt 223 lb 8.7 oz (101.4 kg)   SpO2 99%   BMI 32.08 kg/m²   General appearance:  in no acute distress  HEENT: normocephalic, atraumatic,   Neck: supple, trachea midline  Lungs:breathing comfortably on mv  Heart[de-identified] regular rate and rhythm,  Abdomen: soft, non-tender; non distended,  Extremities: edema plus  Neurologic: sedated    Assessment and Plan:   - JUSTIN from ATN; - resolving creat normalizing- excellent output  - ANIOn gap metabolic acidosis:  from lactic aicdosis//Bicarb level of 9 on admission //Serum acetaminophen and salicylate, ethanol and BHA were negative.////nolrmal osmolal gap of -7//resolved with volume expansion  - Acute hypoxic resp failure  - ?? ??Polysusbstance abuse?: amphetamines positive as of now  - Hyperglycemia  - Leukocytosis/fevers  - Hyperammonemia  - bradycardia  - encephalopathy     Plan;  - cr improved  K 4.7  Cont  loop for maintenance  of non oliguric state and keep negative balance to facilitate extubation  Recheck BMP daily   Follow clsoley                     Electronically signed by Kelly Cortes MD on 3/15/2020 at 10:14 AM    [address]  [place] [address],  [facility] [address]  PHONE: [phone]  FAX: [phone]

## 2020-03-15 NOTE — PROGRESS NOTES
BG 59, Recheck 63. 12.5g of D50 admin per protocol. Recheck BG in 5 mins was 82. No extubation today, so restarted TFs through new OG.

## 2020-03-15 NOTE — PROGRESS NOTES
day    pantoprazole  40 mg Intravenous Daily    insulin lispro  0-12 Units Subcutaneous Q6H    chlorhexidine  15 mL Mouth/Throat BID    aspirin  81 mg Oral Daily    albuterol sulfate HFA  4 puff Inhalation Q4H WA    ipratropium  2 puff Inhalation Q4H WA      Infusions:   0.45 % NaCl with KCl 20 mEq 10 mL/hr at 03/14/20 0918    dextrose      midazolam 10 mg/hr (03/15/20 1051)    fentanyl 150 mcg/hr (03/15/20 0530)      PRN Meds:  ipratropium-albuterol, LORazepam, ziprasidone, carboxymethylcellulose, sodium chloride flush, acetaminophen, glucose, dextrose, glucagon (rDNA), dextrose, sodium chloride flush, [DISCONTINUED] acetaminophen **OR** acetaminophen, calcium gluconate IVPB **OR** calcium gluconate IVPB **OR** calcium gluconate IVPB **OR** calcium gluconate IVPB, sodium phosphate IVPB **OR** sodium phosphate IVPB       Physical Exam:  Vitals:    03/15/20 1200   BP: (!) 122/42   Pulse: (!) 44   Resp: 14   Temp: 98.2 °F (36.8 °C)   SpO2: 100%        General: on vent sedated  Chest: Nontender  Cardiac: sinus   Lungs:Clear to auscultation and percussion. Abdomen: Soft, NT, ND, +BS  Extremities: no edema   Vascular:  Equal 2+ peripheral pulses. Lab Data:  CBC:   Recent Labs     03/13/20  0921 03/14/20  0601 03/15/20  0220   WBC 7.5 7.4 7.6   HGB 10.0* 9.2* 9.6*   HCT 32.6* 30.8* 32.3*   MCV 96.7 96.6 98.5    258 286     BMP:   Recent Labs     03/13/20  0624 03/14/20  0601 03/15/20  0220    139 139   K 4.2 4.3 4.7    103 103   CO2 24 26 23   PHOS 3.6  --   --    BUN 14 15 17   CREATININE 1.3 1.1 1.2     LIVER PROFILE:   Recent Labs     03/13/20  0624 03/14/20  0601 03/15/20  0220   AST 55* 46* 47*   ALT 33 34 36   BILITOT 0.2 0.2 0.3   ALKPHOS 142* 130* 123     PT/INR: No results for input(s): PROTIME, INR in the last 72 hours. APTT: No results for input(s): APTT in the last 72 hours. BNP:  No results for input(s): BNP in the last 72 hours.       Assessment:  Patient Active Problem List    Diagnosis Date Noted    Diverticulitis of colon     Acute [facility] failure with hypoxia ([facility]     Delirium     [facility]     Hyperglycemia     Metabolic encephalopathy     Sepsis ([facility] 03/08/2020    Anxiety        Rachna Baca MD 3/15/2020 12:08 PM

## 2020-03-15 NOTE — PROGRESS NOTES
Pt very agitated & restless, trying to sit up and kicking his legs out of the bed. Pts eyes are open, but he does not track or follow commands. Dr. Shauna Jenkins notified & orders received for additional meds for sedation.

## 2020-03-15 NOTE — PROGRESS NOTES
Patient remain intubated and combative  We are awaiting full metabolic recovery to attempt extubation per discussion with IM. I will sign the patient over to Dr. Mariangel Galindo. Thank you  Tsering Pierce CNP

## 2020-03-15 NOTE — PROGRESS NOTES
[address] [facility]  consulted by Dr. Angella Bates for monitoring and adjustment. Indication for treatment: Severe sepsis  Goal trough: 15-20 mcg/mL  Other antimicrobials:  Zosyn     Pertinent Laboratory Values:   Temp Readings from Last 3 Encounters:   03/15/20 98.2 °F (36.8 °C) (Rectal)   01/07/20 97.7 °F (36.5 °C)     Recent Labs     03/13/20  0624 03/13/20  0921 03/14/20  0601 03/15/20  0220   WBC  --  7.5 7.4 7.6   LACTATE 0.8  --  0.8 1.3     Recent Labs     03/13/20  0624 03/14/20  0601 03/15/20  0220   BUN 14 15 17   CREATININE 1.3 1.1 1.2     Estimated Creatinine Clearance: 82 mL/min (based on SCr of 1.2 mg/dL). Intake/Output Summary (Last 24 hours) at 3/15/2020 1124  Last data filed at 3/15/2020 0719  Gross per 24 hour   Intake 1382.87 ml   Output 1350 ml   Net 32.87 ml       Pertinent Cultures:  Date    Source    Results  3/8   Blood    NGTD  3/8   Respiratory PCR  Negative   3/8   CSF         NGTD     3/8   Tracheal aspirate  Strep Pneumo    Vancomycin level:   TROUGH:    Recent Labs     03/15/20  0220   VANCOTROUGH 11.3     RANDOM:    Recent Labs     03/13/20  0921   VANCORANDOM 9.3  (NOTE)  Therapeutic Range Interpretation: Please refer to current dosing   guidelines. Assessment:  · WBC and temperature: WBC WNL;  Afebrile  · SCr, BUN, and urine output: stable  · Day(s) of therapy: 8  · Vancomycin level: 11.3 (~20 hours)     Plan:  · Continue vancomycin 1250mg q18h   · Trough around 12.7 with an AUC of 496  · Pharmacy will continue to monitor patient and adjust therapy as indicated    Thank you for the consult,  Eloy Riggs [facility]  3/15/2020 11:24 AM

## 2020-03-15 NOTE — PROGRESS NOTES
Pulmonary and Critical Care  Progress Note      VITALS:  BP (!) 122/42   Pulse (!) 44   Temp 98.2 °F (36.8 °C) (Rectal)   Resp 14   Ht (S) 5' 10\" (1.778 m)   Wt 223 lb 8.7 oz (101.4 kg)   SpO2 100%   BMI 32.08 kg/m²     Subjective:   CHIEF COMPLAINT :AMS     HPI:                The patient is a 62 y.o. male with significant past medical history of anxiety disorder, alcohol abuse  presents with complaints of AMS. He was intubated for the resp and metabolic acidosis. He is on the vent sedated. Objective:   PHYSICAL EXAM:    LUNGS:Occasional basal crackles  Abd-soft, BS+,NT  Ext - no pedal edema  CVS-s1s2, no murmurs      DATA:    CBC:  Recent Labs     03/13/20  0921 03/14/20  0601 03/15/20  0220   WBC 7.5 7.4 7.6   RBC 3.37* 3.19* 3.28*   HGB 10.0* 9.2* 9.6*   HCT 32.6* 30.8* 32.3*    258 286   MCV 96.7 96.6 98.5   MCH 29.7 28.8 29.3   MCHC 30.7* 29.9* 29.7*   RDW 15.2* 15.1* 15.0*   SEGSPCT 64.2 60.7 68.0*   BANDSPCT  --   --  12*      BMP:  Recent Labs     03/13/20  0624 03/14/20  0601 03/15/20  0220    139 139   K 4.2 4.3 4.7    103 103   CO2 24 26 23   BUN 14 15 17   CREATININE 1.3 1.1 1.2   CALCIUM 8.1* 8.2* 8.3   GLUCOSE 99 96 96      ABG:  Recent Labs     03/13/20  0700   PH 7.37   PO2ART 73*   [id number] 44.0   O2SAT 93.6*     BNP  No results found for: BNP   D-Dimer:  No results found for: DDIMER   1. Radiology: Reviewed      Assessment/Plan     Patient Active Problem List    Diagnosis Date Noted    Diverticulitis of colon     Acute respiratory failure with hypoxia ([facility]     Delirium     Hyperammonemia ([facility]     Hyperglycemia     Metabolic encephalopathy     Sepsis ([facility] 03/08/2020    Anxiety      Patient is still in delirium, not following commands on the vent. Toxic / Metabolic encephalopathy  VDRF  Jenean Lights cardia  Hypomagnesemia       1. Magnesium sulfate  2. CT head  3. Await Neurology eval  4. Will wean vent when the patient is more stable  5.  C/w present management  No follow-ups on file.     Electronically signed by Liyah Gonzalez MD on 3/15/2020 at 12:36 PM

## 2020-03-15 NOTE — PROGRESS NOTES
Spoke with RN about pt status. RN informed me that pt is unable to lay still for Brain with contrast MRI.   Will check back 3/16

## 2020-03-16 ENCOUNTER — APPOINTMENT (OUTPATIENT)
Dept: GENERAL RADIOLOGY | Age: 55
DRG: 853 | End: 2020-03-16
Payer: COMMERCIAL

## 2020-03-16 LAB
ALBUMIN SERPL-MCNC: 2.5 GM/DL (ref 3.4–5)
ALP BLD-CCNC: 102 IU/L (ref 40–129)
ALT SERPL-CCNC: 29 U/L (ref 10–40)
ANION GAP SERPL CALCULATED.3IONS-SCNC: 7 MMOL/L (ref 4–16)
AST SERPL-CCNC: 32 IU/L (ref 15–37)
BASE EXCESS MIXED: ABNORMAL (ref 0–1.2)
BILIRUB SERPL-MCNC: 0.3 MG/DL (ref 0–1)
BILIRUBIN DIRECT: 0.2 MG/DL (ref 0–0.3)
BILIRUBIN, INDIRECT: 0.1 MG/DL (ref 0–0.7)
BUN BLDV-MCNC: 14 MG/DL (ref 6–23)
CALCIUM SERPL-MCNC: 8.3 MG/DL (ref 8.3–10.6)
CARBON MONOXIDE, BLOOD: 1.5 % (ref 0–5)
CHLORIDE BLD-SCNC: 106 MMOL/L (ref 99–110)
CO2 CONTENT: 34.7 MMOL/L (ref 19–24)
CO2: 31 MMOL/L (ref 21–32)
COMMENT: ABNORMAL
CREAT SERPL-MCNC: 1.2 MG/DL (ref 0.9–1.3)
GFR AFRICAN AMERICAN: >60 ML/MIN/1.73M2
GFR NON-AFRICAN AMERICAN: >60 ML/MIN/1.73M2
GLUCOSE BLD-MCNC: 107 MG/DL (ref 70–99)
GLUCOSE BLD-MCNC: 107 MG/DL (ref 70–99)
GLUCOSE BLD-MCNC: 113 MG/DL (ref 70–99)
GLUCOSE BLD-MCNC: 144 MG/DL (ref 70–99)
GLUCOSE BLD-MCNC: 91 MG/DL (ref 70–99)
HCO3 ARTERIAL: 33.1 MMOL/L (ref 18–23)
HCT VFR BLD CALC: 33.1 % (ref 42–52)
HEMOGLOBIN: 10.2 GM/DL (ref 13.5–18)
MCH RBC QN AUTO: 29.5 PG (ref 27–31)
MCHC RBC AUTO-ENTMCNC: 30.8 % (ref 32–36)
MCV RBC AUTO: 95.7 FL (ref 78–100)
METHEMOGLOBIN ARTERIAL: 0.7 %
O2 SATURATION: 93.9 % (ref 96–97)
PCO2 ARTERIAL: 51 MMHG (ref 32–45)
PDW BLD-RTO: 15.3 % (ref 11.7–14.9)
PH BLOOD: 7.42 (ref 7.34–7.45)
PLATELET # BLD: 331 K/CU MM (ref 140–440)
PMV BLD AUTO: 9.7 FL (ref 7.5–11.1)
PO2 ARTERIAL: 70 MMHG (ref 75–100)
POTASSIUM SERPL-SCNC: 3.8 MMOL/L (ref 3.5–5.1)
RBC # BLD: 3.46 M/CU MM (ref 4.6–6.2)
SODIUM BLD-SCNC: 144 MMOL/L (ref 135–145)
TOTAL PROTEIN: 4.7 GM/DL (ref 6.4–8.2)
WBC # BLD: 7.1 K/CU MM (ref 4–10.5)

## 2020-03-16 PROCEDURE — 2580000003 HC RX 258: Performed by: HOSPITALIST

## 2020-03-16 PROCEDURE — 6370000000 HC RX 637 (ALT 250 FOR IP): Performed by: HOSPITALIST

## 2020-03-16 PROCEDURE — 6360000002 HC RX W HCPCS: Performed by: INTERNAL MEDICINE

## 2020-03-16 PROCEDURE — 6370000000 HC RX 637 (ALT 250 FOR IP): Performed by: INTERNAL MEDICINE

## 2020-03-16 PROCEDURE — 6360000002 HC RX W HCPCS: Performed by: PSYCHIATRY & NEUROLOGY

## 2020-03-16 PROCEDURE — 2580000003 HC RX 258: Performed by: INTERNAL MEDICINE

## 2020-03-16 PROCEDURE — 94640 AIRWAY INHALATION TREATMENT: CPT

## 2020-03-16 PROCEDURE — 2000000000 HC ICU R&B

## 2020-03-16 PROCEDURE — 71045 X-RAY EXAM CHEST 1 VIEW: CPT

## 2020-03-16 PROCEDURE — 2500000003 HC RX 250 WO HCPCS: Performed by: INTERNAL MEDICINE

## 2020-03-16 PROCEDURE — 6360000002 HC RX W HCPCS: Performed by: NURSE PRACTITIONER

## 2020-03-16 PROCEDURE — 82803 BLOOD GASES ANY COMBINATION: CPT

## 2020-03-16 PROCEDURE — 36600 WITHDRAWAL OF ARTERIAL BLOOD: CPT

## 2020-03-16 PROCEDURE — 36592 COLLECT BLOOD FROM PICC: CPT

## 2020-03-16 PROCEDURE — 94003 VENT MGMT INPAT SUBQ DAY: CPT

## 2020-03-16 PROCEDURE — 2700000000 HC OXYGEN THERAPY PER DAY

## 2020-03-16 PROCEDURE — 82248 BILIRUBIN DIRECT: CPT

## 2020-03-16 PROCEDURE — 6360000002 HC RX W HCPCS: Performed by: HOSPITALIST

## 2020-03-16 PROCEDURE — 2580000003 HC RX 258: Performed by: PSYCHIATRY & NEUROLOGY

## 2020-03-16 PROCEDURE — 80053 COMPREHEN METABOLIC PANEL: CPT

## 2020-03-16 PROCEDURE — 82962 GLUCOSE BLOOD TEST: CPT

## 2020-03-16 PROCEDURE — 94761 N-INVAS EAR/PLS OXIMETRY MLT: CPT

## 2020-03-16 PROCEDURE — 85027 COMPLETE CBC AUTOMATED: CPT

## 2020-03-16 PROCEDURE — 99233 SBSQ HOSP IP/OBS HIGH 50: CPT | Performed by: NURSE PRACTITIONER

## 2020-03-16 PROCEDURE — 89220 SPUTUM SPECIMEN COLLECTION: CPT

## 2020-03-16 PROCEDURE — 51702 INSERT TEMP BLADDER CATH: CPT

## 2020-03-16 PROCEDURE — 2500000003 HC RX 250 WO HCPCS: Performed by: HOSPITALIST

## 2020-03-16 PROCEDURE — C9113 INJ PANTOPRAZOLE SODIUM, VIA: HCPCS | Performed by: INTERNAL MEDICINE

## 2020-03-16 RX ORDER — THIAMINE HYDROCHLORIDE 100 MG/ML
100 INJECTION, SOLUTION INTRAMUSCULAR; INTRAVENOUS DAILY
Status: DISCONTINUED | OUTPATIENT
Start: 2020-03-16 | End: 2020-03-16 | Stop reason: CLARIF

## 2020-03-16 RX ADMIN — LORAZEPAM 2 MG: 2 INJECTION INTRAMUSCULAR; INTRAVENOUS at 11:55

## 2020-03-16 RX ADMIN — FUROSEMIDE 20 MG: 10 INJECTION, SOLUTION INTRAVENOUS at 08:25

## 2020-03-16 RX ADMIN — MIDAZOLAM HYDROCHLORIDE 9 MG/HR: 5 INJECTION, SOLUTION INTRAMUSCULAR; INTRAVENOUS at 18:48

## 2020-03-16 RX ADMIN — Medication 175 MCG/HR: at 00:33

## 2020-03-16 RX ADMIN — METRONIDAZOLE 500 MG: 500 INJECTION, SOLUTION INTRAVENOUS at 06:26

## 2020-03-16 RX ADMIN — SODIUM CHLORIDE, PRESERVATIVE FREE 10 ML: 5 INJECTION INTRAVENOUS at 08:26

## 2020-03-16 RX ADMIN — PANTOPRAZOLE SODIUM 40 MG: 40 INJECTION, POWDER, FOR SOLUTION INTRAVENOUS at 08:25

## 2020-03-16 RX ADMIN — Medication 175 MCG/HR: at 20:21

## 2020-03-16 RX ADMIN — ZIPRASIDONE MESYLATE 10 MG: 20 INJECTION, POWDER, LYOPHILIZED, FOR SOLUTION INTRAMUSCULAR at 11:44

## 2020-03-16 RX ADMIN — LORAZEPAM 2 MG: 2 INJECTION INTRAMUSCULAR; INTRAVENOUS at 18:46

## 2020-03-16 RX ADMIN — Medication 2 PUFF: at 15:34

## 2020-03-16 RX ADMIN — LORAZEPAM 1 MG: 1 TABLET ORAL at 21:15

## 2020-03-16 RX ADMIN — CHLORHEXIDINE GLUCONATE 0.12% ORAL RINSE 15 ML: 1.2 LIQUID ORAL at 08:26

## 2020-03-16 RX ADMIN — Medication 2 PUFF: at 08:03

## 2020-03-16 RX ADMIN — QUETIAPINE FUMARATE 25 MG: 25 TABLET ORAL at 08:25

## 2020-03-16 RX ADMIN — ALBUTEROL SULFATE 4 PUFF: 90 AEROSOL, METERED RESPIRATORY (INHALATION) at 08:03

## 2020-03-16 RX ADMIN — FUROSEMIDE 20 MG: 10 INJECTION, SOLUTION INTRAVENOUS at 18:01

## 2020-03-16 RX ADMIN — ENOXAPARIN SODIUM 30 MG: 30 INJECTION SUBCUTANEOUS at 08:25

## 2020-03-16 RX ADMIN — CHLORHEXIDINE GLUCONATE 0.12% ORAL RINSE 15 ML: 1.2 LIQUID ORAL at 21:14

## 2020-03-16 RX ADMIN — ASPIRIN 81 MG 81 MG: 81 TABLET ORAL at 08:25

## 2020-03-16 RX ADMIN — ZIPRASIDONE MESYLATE 10 MG: 20 INJECTION, POWDER, LYOPHILIZED, FOR SOLUTION INTRAMUSCULAR at 18:46

## 2020-03-16 RX ADMIN — LORAZEPAM 1 MG: 1 TABLET ORAL at 08:25

## 2020-03-16 RX ADMIN — METRONIDAZOLE 500 MG: 500 INJECTION, SOLUTION INTRAVENOUS at 14:08

## 2020-03-16 RX ADMIN — ALBUTEROL SULFATE 4 PUFF: 90 AEROSOL, METERED RESPIRATORY (INHALATION) at 19:05

## 2020-03-16 RX ADMIN — Medication 400 MG: at 08:25

## 2020-03-16 RX ADMIN — METRONIDAZOLE 500 MG: 500 INJECTION, SOLUTION INTRAVENOUS at 21:14

## 2020-03-16 RX ADMIN — MIDAZOLAM HYDROCHLORIDE 10 MG/HR: 5 INJECTION, SOLUTION INTRAMUSCULAR; INTRAVENOUS at 07:53

## 2020-03-16 RX ADMIN — Medication 2 PUFF: at 19:05

## 2020-03-16 RX ADMIN — Medication 175 MCG/HR: at 07:00

## 2020-03-16 RX ADMIN — Medication 2 PUFF: at 11:35

## 2020-03-16 RX ADMIN — Medication 150 MCG/HR: at 13:43

## 2020-03-16 RX ADMIN — THIAMINE HYDROCHLORIDE 100 MG: 100 INJECTION, SOLUTION INTRAMUSCULAR; INTRAVENOUS at 21:14

## 2020-03-16 RX ADMIN — ALBUTEROL SULFATE 4 PUFF: 90 AEROSOL, METERED RESPIRATORY (INHALATION) at 15:34

## 2020-03-16 RX ADMIN — QUETIAPINE FUMARATE 25 MG: 25 TABLET ORAL at 21:14

## 2020-03-16 RX ADMIN — Medication 400 MG: at 21:14

## 2020-03-16 RX ADMIN — CEFTRIAXONE 1 G: 1 INJECTION, POWDER, FOR SOLUTION INTRAMUSCULAR; INTRAVENOUS at 09:50

## 2020-03-16 RX ADMIN — VANCOMYCIN HYDROCHLORIDE 1250 MG: 5 INJECTION, POWDER, LYOPHILIZED, FOR SOLUTION INTRAVENOUS at 12:21

## 2020-03-16 RX ADMIN — ALBUTEROL SULFATE 4 PUFF: 90 AEROSOL, METERED RESPIRATORY (INHALATION) at 11:35

## 2020-03-16 ASSESSMENT — PULMONARY FUNCTION TESTS
PIF_VALUE: 26
PIF_VALUE: 20
PIF_VALUE: 29
PIF_VALUE: 26
PIF_VALUE: 21
PIF_VALUE: 24
PIF_VALUE: 20
PIF_VALUE: 20
PIF_VALUE: 22
PIF_VALUE: 26
PIF_VALUE: 25
PIF_VALUE: 20
PIF_VALUE: 24
PIF_VALUE: 20

## 2020-03-16 ASSESSMENT — PAIN SCALES - GENERAL
PAINLEVEL_OUTOF10: 0
PAINLEVEL_OUTOF10: 0

## 2020-03-16 NOTE — CONSULTS
Bruce Barker MD.  Section of General Neurology - Adult  Consult Note        Reason for Consult:    Requesting Physician:  No referring provider defined for this encounter. Thank you for your kind referral.    CHIEF COMPLAINT:           HISTORY OF PRESENT ILLNESS:              The patient is a 62 y.o. male with a history of obtunded. pt was admitted for SOB chest pain. pt CT MRI brain was neg. pt s eeg showed diffuse slowing. pt has a hx of ETOH abuse. pt was seen by neurology. Past Medical History:        Diagnosis Date    Anxiety      Past Surgical History:    No past surgical history on file.   Current Medications:   Current Facility-Administered Medications: ipratropium-albuterol (DUONEB) nebulizer solution 1 ampule, 1 ampule, Inhalation, Q4H PRN  LORazepam (ATIVAN) tablet 1 mg, 1 mg, Oral, BID  magnesium oxide (MAG-OX) tablet 400 mg, 400 mg, Oral, BID  LORazepam (ATIVAN) injection 2 mg, 2 mg, Intravenous, Q4H PRN  magnesium sulfate 1 g in dextrose 5% 100 mL IVPB, 1 g, Intravenous, PRN  furosemide (LASIX) injection 20 mg, 20 mg, Intravenous, BID  QUEtiapine (SEROQUEL) tablet 25 mg, 25 mg, Per NG tube, BID  cefTRIAXone (ROCEPHIN) 1 g in dextrose 5 % 50 mL IVPB, 1 g, Intravenous, Daily  metronidazole (FLAGYL) 500 mg in NaCl 100 mL IVPB premix, 500 mg, Intravenous, Q8H  vancomycin (VANCOCIN) 1,250 mg in dextrose 5 % 250 mL IVPB, 1,250 mg, Intravenous, Q18H  0.45 % NaCl with KCl 20 mEq infusion, , Intravenous, Continuous  ziprasidone (GEODON) injection 10 mg, 10 mg, Intramuscular, Q6H PRN  enoxaparin (LOVENOX) injection 30 mg, 30 mg, Subcutaneous, Daily  carboxymethylcellulose (THERATEARS) 1 % ophthalmic gel 1 drop, 1 drop, Both Eyes, Q2H PRN  sodium chloride flush 0.9 % injection 10 mL, 10 mL, Intravenous, 2 times per day  sodium chloride flush 0.9 % injection 10 mL, 10 mL, Intravenous, PRN  acetaminophen (TYLENOL) tablet 650 mg, 650 mg, Oral, Q4H PRN  sodium chloride flush 0.9 % injection 10 mL, 10 mL,

## 2020-03-16 NOTE — PROGRESS NOTES
calcium gluconate IVPB **OR** calcium gluconate IVPB, sodium phosphate IVPB **OR** sodium phosphate IVPB  I/O last 3 completed shifts: In: 2106 [I.V.:1405; NG/GT:501; IV Piggyback:200]  Out: 4367 [Urine:3250]  No intake/output data recorded. Intake/Output Summary (Last 24 hours) at 3/16/2020 0832  Last data filed at 3/16/2020 0646  Gross per 24 hour   Intake 2095.95 ml   Output 3250 ml   Net -1154.05 ml       CBC:   Recent Labs     03/13/20  0921 03/14/20  0601 03/15/20  0220   WBC 7.5 7.4 7.6   HGB 10.0* 9.2* 9.6*    258 286       BMP:    Recent Labs     03/14/20  0601 03/15/20  0220    139   K 4.3 4.7    103   CO2 26 23   BUN 15 17   CREATININE 1.1 1.2   GLUCOSE 96 96     Hepatic:   Recent Labs     03/14/20  0601 03/15/20  0220   AST 46* 47*   ALT 34 36   BILITOT 0.2 0.3   ALKPHOS 130* 123     Troponin: No results for input(s): TROPONINI in the last 72 hours. BNP: No results for input(s): BNP in the last 72 hours. Lipids: No results for input(s): CHOL, HDL in the last 72 hours. Invalid input(s): LDLCALCU  ABGs:   Lab Results   Component Value Date    PO2ART 72 03/15/2020    [id number] 44.0 03/15/2020     INR: No results for input(s): INR in the last 72 hours.     Objective:   Vitals: BP (!) 103/36   Pulse 73   Temp 98.4 °F (36.9 °C) (Rectal)   Resp 16   Ht (S) 5' 10\" (1.778 m)   Wt 213 lb 13.5 oz (97 kg)   SpO2 99%   BMI 30.68 kg/m²   General appearance: , in no acute distress  HEENT: normocephalic, atraumatic,  et tube in place  Neck: supple, trachea midline  Lungs:  breathing comfortably on mv  Heart[de-identified] regular rate and rhythm,   Abdomen: soft, non-tender; non distended,  Extremities: no ble edema  Neurologic: sedated    Assessment and Plan:     - JUSTIN from ATN; - resolving creat normalizing- excellent output  - ANIOn gap metabolic acidosis:  from lactic aicdosis//Bicarb level of 9 on admission //Serum acetaminophen and salicylate, ethanol and BHA were negative.////nolrmal osmolal gap of -7//resolved with volume expansion  - Acute hypoxic resp failure  - ?? ??Polysusbstance abuse?: amphetamines positive as of now  - Hyperglycemia  - Leukocytosis/fevers  - Hyperammonemia  - bradycardia  - encephalopathy  - fluid overload     Plan;  - cr stable  - Continue gentle diuresis with lasix  - Monitor for signs of overdiuresis  - Critically ill  - Will follow                  Electronically signed by Vikram Tay DO on 3/16/2020 at 8:32 AM    [address],  [place]  [place] [address]  PHONE: [phone]  FAX: [phone]

## 2020-03-16 NOTE — PROGRESS NOTES
Pulmonary and Critical Care  Progress Note    Subjective: The patient is still agitated off sedation. CXR:Reviewed. Shortness of breath none. Chest pain none. Addressing respiratory complaints Patient is negative for  hemoptysis and cyanosis  CONSTITUTIONAL:  negative for fevers and chills      Past Medical History:     has a past medical history of Anxiety. has no past surgical history on file. reports that he has never smoked. His smokeless tobacco use includes snuff. He reports current alcohol use. He reports that he does not use drugs. Family history:  family history includes Cancer in his father; Coronary Art Dis in his mother; Diabetes in an other family member. No Known Allergies  Social History:    Reviewed; no changes    Objective:   PHYSICAL EXAM:        VITALS:  BP (!) 103/36   Pulse 73   Temp 98.4 °F (36.9 °C) (Rectal)   Resp 16   Ht (S) 5' 10\" (1.778 m)   Wt 213 lb 13.5 oz (97 kg)   SpO2 99%   BMI 30.68 kg/m²     24HR INTAKE/OUTPUT:      Intake/Output Summary (Last 24 hours) at 3/16/2020 1112  Last data filed at 3/16/2020 0646  Gross per 24 hour   Intake 2095.95 ml   Output 3250 ml   Net -1154.05 ml       CONSTITUTIONAL:  Sedated on vent. LUNGS:  decreased breath sounds, occ basilar crackles. CARDIOVASCULAR:  normal S1 and S2 and negative JVD  ABD:Abdomen soft, non-tender. BS normal. No masses,  No organomegaly  NEURO:Sedated on vent.   DATA:    CBC:  Recent Labs     03/14/20  0601 03/15/20  0220 03/16/20  0950   WBC 7.4 7.6 7.1   RBC 3.19* 3.28* 3.46*   HGB 9.2* 9.6* 10.2*   HCT 30.8* 32.3* 33.1*    286 331   MCV 96.6 98.5 95.7   MCH 28.8 29.3 29.5   MCHC 29.9* 29.7* 30.8*   RDW 15.1* 15.0* 15.3*   SEGSPCT 60.7 68.0*  --    BANDSPCT  --  12*  --       BMP:  Recent Labs     03/14/20  0601 03/15/20  0220 03/16/20  0950    139 144   K 4.3 4.7 3.8    103 106   CO2 26 23 31   BUN 15 17 14   CREATININE 1.1 1.2 1.2   CALCIUM 8.2* 8.3 8.3   GLUCOSE 96 96 107*

## 2020-03-16 NOTE — PROGRESS NOTES
[address] [place] [address]  HOSPITALIST PROGRESS NOTE                       Name:  Anitha Montanez /Age/Sex: 1965  (62 y.o. male)   MRN & CSN:  [id number] & [id number] Admission Date/Time: 3/8/2020  3:44 AM   Location:  -A Attending:  Juan Heart MD                                                  HPI  Anitha Montanez is a 62 y.o. male who presents with acute encephalopathy-intubated in the field    SUBJECTIVE  -on sedation, nurse states that on sedation vacation patient finally started following commands.  -on ventilator   -Making urine.  -on TF. Had a BM yesterday.  -Blood pressure stable. Heart rate occasionally drops into the 40s. ROS-unable to complete due to sedation      ALLERGIES: No Known Allergies    PCP: No primary care provider on file. PAST MEDICAL HISTORY, SURGICAL HISTORY, SOCIAL HISTORY and  HOME MEDICATIONS all reviewed. OBJECTIVE  Vitals:    20 1105 20 1136 20 1138 20 1200   BP: (!) 103/40      Pulse: (!) 45  51    Resp: 22    Temp:    98.8 °F (37.1 °C)   TempSrc:    Rectal   SpO2: 99%  98%    Weight:       Height:           PHYSICAL EXAM   GEN sedated male, on ventilator,   HEENT NCAT. Mayi Beagle left eyelid appears swollen and appears blood-filled  RESP Ventilator, some coarseness  CARDIO/VASC S1/S2 auscultated. Regular rate   GI Abdomen is soft, nondistended  MSK no edema in LE,  No gross joint deformities. SKIN Normal coloration, warm, dry.   NEURO sedated    INTAKE: In: 2216 [I.V.:1395; NG/GT:621]  Out: 3250   OUTPUT: In: 2216   Out: 3250 [Urine:3250]    LABS  Recent Labs     20  0601 03/15/20  0220 20  0950   WBC 7.4 7.6 7.1   HGB 9.2* 9.6* 10.2*   HCT 30.8* 32.3* 33.1*    286 331      Recent Labs     20  0601 03/15/20  0220 20  0950    139 144   K 4.3 4.7 3.8    103 106   CO2 26 23 31   BUN 15 17 14   CREATININE 1.1 1.2 1.2     Recent Labs     20  0601 03/15/20  0220 20  0950   AST 46* 47* 32   ALT Stopped acyclovir. CT abdomen pelvis: Diverticulitis; new bibasilar consolidations; possible cirrhosis. Procalcitonin is improving. Respiratory culture: Strep pneumo resistant to penicillin. Stopped Unasyn. On Rocephin and add Flagyl. Continue vancomycin. Blood culture: No growth. -We will check with ID to see if vancomycin can be stopped. 2-Severe Anion gap metabolic acidosis-resolved  Acidosis has resolved. Nephrology following. Likely from elevated lactate. Patient received sodium bicarb. 3-Acute respiratory failure due to pneumonia and now CHF  CT chest non-acute, on the vent. pulm consulted. Pulmonology would like to attempt extubation on patient, however patient is requiring significant sedation due to agitation. Unable to use Precedex due to bradycardia. CXR: Congestive changes. Started on IV Lasix.    -Diuresed at least 3 L.  CXR: Stable congestion. 4-Acute metabolic encephalopathy, Likely multifactorial from hyperammonemia, amphetamine use, acute kidney injury, sepsis. Ammonia level:470, but quickly normalized. CT head non-acute. Neuro following. CSF: slightly elevated WBCs. Possible aseptic meningitis. Very agitated. Start PRN Ativan and Geodon. Agitation may be due to possible alcohol withdrawal.  EEG: Severe diffuse slowing of the background. MRI brain without contrast: No acute process. If agitation is from alcohol withdrawal then patient should start to show some behavioral improvement now. Start Seroquel twice daily to see if can help reduce combativeness. Increase PRN Ativan to use in between Geodon. -  Patient is followed instructions today for the first time. It is possible that patient may have prolonged withdrawal symptoms which are finally improving. CT head: No acute process. 5-Hyperglycemia  Not diabetic. hemoglobin A1C: normal    6-NSTEMI with arrythmia/2:1 heart block  Cardio following  Cardiac cath: Mild CAD.   Echo: 50-55%; severe eccentric aortic regurgitation. Aspirin. Off dopamine drip. Will likely need pacemaker once more stable. 7-Alcoholism  Possible withdrawal.  On Versed drip. Also added Ativan as needed. If true alcohol withdrawal then hopeful for improvement soon.  -Once extubated will need CIWA scale. 8 acute kidney injury   Nephrology following. Creatinine appears to be stable. 9 hypocalcemia  Replacement protocol and recheck    10 rhabdomyolysis  Possible concern for seizure. On IV fluids. CK improving. 11 severe aortic regurg  need valve surgery once stable. 12 possible aseptic meningitis  CSF showed 6 WBCs and 100% lymphocytes. CSF culture: No growth to date    13 anemia  Monitor hemoglobin. Stable     14 eyelid swelling and irritation   Likely due to edema. Irritation likely due to patient being combative. Started on Lasix.  -Opted to see patient today. 15 hypomagnesemia  Started replacement protocol.    -family reports he is not on any prescription medications, no known chronic medical issues. Diet DIET TUBE FEED CONTINUOUS/CYCLIC NPO; Immune Enhancing (Pivot 1.5 Isaias); Nasoenteric; Continuous; 20; 60; 24  Dietary Nutrition Supplements: Snack (see comment)   DVT Prophylaxis [x] Lovenox, []  Heparin, [] SCDs, [] Ambulation   GI Prophylaxis [] PPI,  [] H2 Blocker,  [] Carafate,  [] Diet/Tube Feeds   Code Status Full Code   Disposition Need mental status to improve.    CMS Level of Risk [] Low, [] Moderate,[x]  High  Patient's risk as above due to severe sepsis/metabolic acidosis     Lupe Edward MD 3/16/2020 2:01 PM

## 2020-03-16 NOTE — PROGRESS NOTES
Infectious Disease Progress Note  3/16/2020   Patient Name: Missy Sarah : 1965   Impression  · Severe Sepsis, Streptococcus Pneumoniae:  ? Fever max 101.5, now afebrile >48 hours  ? Leukocytosis 19.4 initially, now down to 7.1  ? Sputum culture 3/8-Strep pneumoniae, Micrococcus Species, Gamma Strep  ? CSF culture 3/9-NGTD, Spinal Fluid analysis RBC 28, WBC 2, Lymphs 100, Segs 0  ? Blood Culture 3/8-(2 sets) 0/-NGTD  ? HSV PCR 3/10-Negative  ? RDP 3/8-Negative  ? Procalictonin -2.71, -7.59, 3/13-0.74, 3/15-0.353  ? CRP 3/.1, 3/.7  ? Imaging: 3/13/20-CXR reviewed, layering bilateral pleural effusions . ? CT A&P 3/12-No perforation or drainable fluid collection, uncomplicated distal descending colonic diverticulitis, trace likely free fluid in the pelvis. New bibasilar dependent consolidations compatible with atelectasis vs pneumonia vs aspiration. Small bilateral pleural effusions. Possibility of cirrhosis. · Left eye irritation: sclera and conjunctiva reddened and edematous, consultation for ophthalmology, Dr. Brandin Yee 3/16    · Acute Hypoxic Respiratory Failure:  Pulmonology onboard, Dr. Roderick Matamoros,  Intubated on  mechanical ventilation 3/8, attempted to wean 3/12- without success  ? AR:  CTS onboard, Dr. Hilario Hayden, will need LHC pending resolution of JUSTIN and resp failure to determine best timing for AVR  ? Diverticulitis:   ? JUSTIN from ATN: Nephrology onboard, Dr. Jose Luis Evans, improving  ? Anion Gap Metabolic Acidosis:  resolved  ? Toxic/Metabolic Encehalopathy:  Neurology onboard, Dr. Jordon Chinchilla, abnormal EEG, continuing to r/o meningitis/encephalitis. MRI brain no acute abnormality. HSV negative  ? Bradycardia:  Cardio onboard, Dr. Ruiz Rojas, 2:1 HB, plan for PPM once extubated, underwent LHC 3/8/20: EDP 25-30, left main patent, LAD, cir, ramus and RCA all mild CAD. TTE 3/9-EF 50-55%, trileaflet AV with severe AR.    · Multi-morbidity: per PMHx:  Anxiety, occasional alcohol use atelectasis. 3/16/20 XR Chest Portable:  Impression   Support lines in good position       Stable congestive changes.        Labs:    Recent Results (from the past 24 hour(s))   Blood gas, arterial    Collection Time: 03/15/20  1:00 PM   Result Value Ref Range    pH, Bld 7.43 7.34 - 7.45    pCO2, Arterial 44.0 32 - 45 MMHG    pO2, Arterial 72 (L) 75 - 100 MMHG    Base Exc, Mixed 4.2  PLUS   (H) 0 - 1.2    HCO3, Arterial 29.2 (H) 18 - 23 MMOL/L    CO2 Content 30.6 (H) 19 - 24 MMOL/L    O2 Sat 93.7 (L) 96 - 97 %    Carbon Monoxide, Blood 1.8 0 - 5 %    Methemoglobin, Arterial 1.0 <1.5 %    Comment SIMV 10 500 PS 15 +5 40%    Ammonia    Collection Time: 03/15/20  3:45 PM   Result Value Ref Range    Ammonia 17 16 - 60 UMOL/L   POCT Glucose    Collection Time: 03/15/20  5:10 PM   Result Value Ref Range    POC Glucose 63 (L) 70 - 99 MG/DL   POCT Glucose    Collection Time: 03/15/20  5:21 PM   Result Value Ref Range    POC Glucose 82 70 - 99 MG/DL   POCT Glucose    Collection Time: 03/15/20 10:36 PM   Result Value Ref Range    POC Glucose 79 70 - 99 MG/DL   POCT Glucose    Collection Time: 03/16/20  6:08 AM   Result Value Ref Range    POC Glucose 91 70 - 99 MG/DL   Basic Metabolic Panel    Collection Time: 03/16/20  9:50 AM   Result Value Ref Range    Sodium 144 135 - 145 MMOL/L    Potassium 3.8 3.5 - 5.1 MMOL/L    Chloride 106 99 - 110 mMol/L    CO2 31 21 - 32 MMOL/L    Anion Gap 7 4 - 16    BUN 14 6 - 23 MG/DL    CREATININE 1.2 0.9 - 1.3 MG/DL    Glucose 107 (H) 70 - 99 MG/DL    Calcium 8.3 8.3 - 10.6 MG/DL    GFR Non-African American >60 >60 mL/min/1.73m2    GFR African American >60 >60 mL/min/1.73m2   CBC    Collection Time: 03/16/20  9:50 AM   Result Value Ref Range    WBC 7.1 4.0 - 10.5 K/CU MM    RBC 3.46 (L) 4.6 - 6.2 M/CU MM    Hemoglobin 10.2 (L) 13.5 - 18.0 GM/DL    Hematocrit 33.1 (L) 42 - 52 %    MCV 95.7 78 - 100 FL    MCH 29.5 27 - 31 PG    MCHC 30.8 (L) 32.0 - 36.0 %    RDW 15.3 (H) 11.7 - 14.9 %

## 2020-03-16 NOTE — PROGRESS NOTES
Per Nathan Johnson, Nurse, MRI is still on hold because of pt being agitated. MRI to be cancelled per Nathan Johnson. They will reorder when patient is able to come downstairs for MRI.

## 2020-03-16 NOTE — PROGRESS NOTES
Sedation vacation completed. Versed to 8mg/hr and Fentanyl @150mcg/hr. Pt awakened and followed commands, squeezed hands & moved toes. Pt becoming more agitated sitting up in bed and attempting to grab ETT. Bolus of 2mg versed administered, and versed increased to 9mg/hr per protocol.

## 2020-03-16 NOTE — CONSULTS
Department of Ophthalmology  Attending Consult Note        Reason for Consult:  Red left eye  Requesting Physician:  Maria L     CHIEF COMPLAINT:  Red left eye    HISTORY OF PRESENT ILLNESS:                Patient is a 62 y.o. male who presents with red eye. Patient is intubated and sedated and unable to answer questions. Noted by hospital staff left eye appear red and swollen  Past Ophthalmic History:    unknow    Past Medical History:        Diagnosis Date    Anxiety      Past Surgical History:  No past surgical history on file. Medications Prior to Admission:  Medications Prior to Admission: benzonatate (TESSALON PERLES) 100 MG capsule, Take 1 capsule by mouth 3 times daily as needed for Cough    Allergies: Patient has no known allergies. Social History:    TOBACCO:   reports that he has never smoked. His smokeless tobacco use includes snuff. ETOH:   reports current alcohol use. DRUGS:   reports no history of drug use. SEXUAL HISTORY:  unkown  }       Family History:     Glaucoma:  unkown  Retinal Problems:  unknown      Problem Relation Age of Onset    Coronary Art Dis Mother     Cancer Father         tongue    Diabetes Other         aunt       REVIEW OF SYSTEMS:    Review of systems not obtained due to patient factors - intubation    PHYSICAL EXAM:      Vitals:  BP (!) 103/40   Pulse 51   Temp 98.8 °F (37.1 °C) (Rectal)   Resp 22   Ht (S) 5' 10\" (1.778 m)   Wt 213 lb 13.5 oz (97 kg)   SpO2 98%   BMI 30.68 kg/m²     Ophthalmic Examination:      Visual Acuity: unable    Near Vision: unable            Pupils Reactive to Light:  3 mm each eye, reactive, no APD    Motility (EOM):  Unable to test    Visual Field: unable to test    Intraocular Pressure: Right eye pen 15 and Left eye pen 16    Drops Given: tropicamide 1 % each eye    Anterior Segment Exam:   L/L wnl both eyes  Conj/sclera  Right wnl  Left, inferior subconj heme with chemosis  Cornea,.   Clear both eyes  iris wnl both eyes  Lens, trace NS both eyes    Fundus  Optic nerve wnl each eye. C/d 0.4  Vitreous clear both eyes  Macula wnl each eyes  Vessels wnl each eye  Periphery wnl each eye. External exam, no proptosis. LAB REVIEW:        IMPRESSION/RECOMMENDATIONS:  1. Subconjunctival hemorrhage OS. With chemosis. Likely related to being on ventilator. No signs of orbital hemorrhage. Should resolved on own. If conjunctiva appears desiccated over time can add ointment.

## 2020-03-16 NOTE — PROGRESS NOTES
Daily Progress Note    On vent sedated  Heart rate remains same  Not sure if he is going through DT   He is more calm  Hopefully extubate soon  From cardiac stand he is stable  Pacer and AVR later on   ID on case   Pt. Sedated on vent this am  HR stable, 2:1 AV block in the 40s, BP low     Sepsis    Secondary to acute diverticulitis, PNA    LP done, off antivirals now, HSV neg.    On ABx    On vent    Acidosis has improved     pulm following-attempting to wean, but very agitated with this     AV block    Off dopamine now and stable    Will need PPM    LHC done-mild disease noted    GINGER done for AI-Severe- will need AVR when stable    CTS has seen-planning for surgery when stable     On Lasix- per Renal- good UOP  Hold on midodrine as can cause bradycardia  EEG nonspecific  MRI neg. Will cont. To follow-await extubation       GINGER-3/9/20   Summary   Left ventricular systolic function is normal.   Ejection fraction is visually estimated at 50-55%.   Mildly dilated left ventricle.   Severe eccentric aortic regurgitation.   Possible fenestration and prolapse of the aortic valve.   Mild to moderate mitral regurgitation is present.  Melia Rast was no thrombus noted in the left atrial appendage.   Negative bubble study. No PFO or ASD noted.     [facility]-3/9/20  IMPRESSION:  1.  EDP is around between 25 and 30 mmHg present. 2.  Left main is patent. 3.  LAD, circ, ramus, and RCA all have mild coronary artery disease  present.     PAST MEDICAL HISTORY:  None.     PAST SURGICAL HISTORY:  None.     SOCIAL HISTORY:  He smokes and drinks.  Also there is history of having  drug abuse present and possibly some abuse still present.     ALLERGIES:  NKDA.       Objective:   BP (!) 103/36   Pulse 73   Temp 98.4 °F (36.9 °C) (Rectal)   Resp 16   Ht (S) 5' 10\" (1.778 m)   Wt 213 lb 13.5 oz (97 kg)   SpO2 99%   BMI 30.68 kg/m²       Intake/Output Summary (Last 24 hours) at 3/16/2020 0918  Last data filed at 3/16/2020 0646  Gross per 24 hour   Intake 2095.95 ml   Output 3250 ml   Net -1154.05 ml       Medications:   Scheduled Meds:   LORazepam  1 mg Oral BID    magnesium oxide  400 mg Oral BID    furosemide  20 mg Intravenous BID    QUEtiapine  25 mg Per NG tube BID    cefTRIAXone (ROCEPHIN) IV  1 g Intravenous Daily    metroNIDAZOLE  500 mg Intravenous Q8H    vancomycin  1,250 mg Intravenous Q18H    enoxaparin  30 mg Subcutaneous Daily    sodium chloride flush  10 mL Intravenous 2 times per day    sodium chloride flush  10 mL Intravenous BID    sodium chloride flush  10 mL Intravenous 2 times per day    pantoprazole  40 mg Intravenous Daily    insulin lispro  0-12 Units Subcutaneous Q6H    chlorhexidine  15 mL Mouth/Throat BID    aspirin  81 mg Oral Daily    albuterol sulfate HFA  4 puff Inhalation Q4H WA    ipratropium  2 puff Inhalation Q4H WA      Infusions:   0.45 % NaCl with KCl 20 mEq 10 mL/hr at 03/14/20 0918    dextrose      midazolam 10 mg/hr (03/16/20 0753)    fentanyl 175 mcg/hr (03/16/20 0700)      PRN Meds:  ipratropium-albuterol, LORazepam, magnesium sulfate, ziprasidone, carboxymethylcellulose, sodium chloride flush, acetaminophen, glucose, dextrose, glucagon (rDNA), dextrose, sodium chloride flush, [DISCONTINUED] acetaminophen **OR** acetaminophen, calcium gluconate IVPB **OR** calcium gluconate IVPB **OR** calcium gluconate IVPB **OR** calcium gluconate IVPB, sodium phosphate IVPB **OR** sodium phosphate IVPB       Physical Exam:  Vitals:    03/16/20 0810   BP:    Pulse:    Resp:    Temp: 98.4 °F (36.9 °C)   SpO2:         General: AAO, NAD  Chest: Nontender  Cardiac: First and Second Heart Sounds are Normal, No Murmurs or Gallops noted  Lungs:Clear to auscultation and percussion. Abdomen: Soft, NT, ND, +BS  Extremities: No clubbing, no edema  Vascular:  Equal 2+ peripheral pulses.         Lab Data:  CBC:   Recent Labs     03/13/20  0921 03/14/20  0601 03/15/20  0220   WBC 7.5 7.4 7.6   HGB 10.0* 9.2* 9.6*

## 2020-03-17 ENCOUNTER — APPOINTMENT (OUTPATIENT)
Dept: GENERAL RADIOLOGY | Age: 55
DRG: 853 | End: 2020-03-17
Payer: COMMERCIAL

## 2020-03-17 ENCOUNTER — APPOINTMENT (OUTPATIENT)
Dept: INTERVENTIONAL RADIOLOGY/VASCULAR | Age: 55
DRG: 853 | End: 2020-03-17
Payer: COMMERCIAL

## 2020-03-17 LAB
ALBUMIN SERPL-MCNC: 2.6 GM/DL (ref 3.4–5)
ALP BLD-CCNC: 107 IU/L (ref 40–129)
ALT SERPL-CCNC: 27 U/L (ref 10–40)
ANION GAP SERPL CALCULATED.3IONS-SCNC: 10 MMOL/L (ref 4–16)
AST SERPL-CCNC: 27 IU/L (ref 15–37)
BASE EXCESS MIXED: ABNORMAL (ref 0–1.2)
BILIRUB SERPL-MCNC: 0.2 MG/DL (ref 0–1)
BILIRUBIN DIRECT: 0.2 MG/DL (ref 0–0.3)
BILIRUBIN, INDIRECT: 0 MG/DL (ref 0–0.7)
BUN BLDV-MCNC: 21 MG/DL (ref 6–23)
CALCIUM SERPL-MCNC: 8.2 MG/DL (ref 8.3–10.6)
CARBON MONOXIDE, BLOOD: 1.3 % (ref 0–5)
CHLORIDE BLD-SCNC: 103 MMOL/L (ref 99–110)
CO2 CONTENT: 35.9 MMOL/L (ref 19–24)
CO2: 30 MMOL/L (ref 21–32)
COMMENT: ABNORMAL
CREAT SERPL-MCNC: 1.2 MG/DL (ref 0.9–1.3)
DOSE AMOUNT: NORMAL
DOSE TIME: NORMAL
FOLATE: 14.6 NG/ML (ref 3.1–17.5)
GFR AFRICAN AMERICAN: >60 ML/MIN/1.73M2
GFR NON-AFRICAN AMERICAN: >60 ML/MIN/1.73M2
GLUCOSE BLD-MCNC: 100 MG/DL (ref 70–99)
GLUCOSE BLD-MCNC: 111 MG/DL (ref 70–99)
GLUCOSE BLD-MCNC: 127 MG/DL (ref 70–99)
GLUCOSE BLD-MCNC: 134 MG/DL (ref 70–99)
GLUCOSE BLD-MCNC: 67 MG/DL (ref 70–99)
GLUCOSE BLD-MCNC: 72 MG/DL (ref 70–99)
GLUCOSE BLD-MCNC: 81 MG/DL (ref 70–99)
HCO3 ARTERIAL: 34.1 MMOL/L (ref 18–23)
HCT VFR BLD CALC: 33.2 % (ref 42–52)
HEMOGLOBIN: 9.8 GM/DL (ref 13.5–18)
HIGH SENSITIVE C-REACTIVE PROTEIN: 15.4 MG/L
MCH RBC QN AUTO: 28.9 PG (ref 27–31)
MCHC RBC AUTO-ENTMCNC: 29.5 % (ref 32–36)
MCV RBC AUTO: 97.9 FL (ref 78–100)
METHEMOGLOBIN ARTERIAL: 0.7 %
O2 SATURATION: 94.5 % (ref 96–97)
PCO2 ARTERIAL: 59 MMHG (ref 32–45)
PDW BLD-RTO: 15.1 % (ref 11.7–14.9)
PH BLOOD: 7.37 (ref 7.34–7.45)
PLATELET # BLD: 317 K/CU MM (ref 140–440)
PMV BLD AUTO: 9.5 FL (ref 7.5–11.1)
PO2 ARTERIAL: 77 MMHG (ref 75–100)
POTASSIUM SERPL-SCNC: 4.1 MMOL/L (ref 3.5–5.1)
PROCALCITONIN: 0.19
PROCALCITONIN: 0.2
RBC # BLD: 3.39 M/CU MM (ref 4.6–6.2)
SODIUM BLD-SCNC: 143 MMOL/L (ref 135–145)
TOTAL PROTEIN: 4.8 GM/DL (ref 6.4–8.2)
VANCOMYCIN TROUGH: 12.4 UG/ML (ref 10–20)
VITAMIN B-12: 1125 PG/ML (ref 211–911)
WBC # BLD: 7.3 K/CU MM (ref 4–10.5)

## 2020-03-17 PROCEDURE — 84145 PROCALCITONIN (PCT): CPT

## 2020-03-17 PROCEDURE — 6360000002 HC RX W HCPCS: Performed by: HOSPITALIST

## 2020-03-17 PROCEDURE — 6360000002 HC RX W HCPCS: Performed by: PSYCHIATRY & NEUROLOGY

## 2020-03-17 PROCEDURE — 6360000002 HC RX W HCPCS: Performed by: INTERNAL MEDICINE

## 2020-03-17 PROCEDURE — 36592 COLLECT BLOOD FROM PICC: CPT

## 2020-03-17 PROCEDURE — 6370000000 HC RX 637 (ALT 250 FOR IP): Performed by: INTERNAL MEDICINE

## 2020-03-17 PROCEDURE — 80202 ASSAY OF VANCOMYCIN: CPT

## 2020-03-17 PROCEDURE — 85027 COMPLETE CBC AUTOMATED: CPT

## 2020-03-17 PROCEDURE — 86141 C-REACTIVE PROTEIN HS: CPT

## 2020-03-17 PROCEDURE — C9113 INJ PANTOPRAZOLE SODIUM, VIA: HCPCS | Performed by: INTERNAL MEDICINE

## 2020-03-17 PROCEDURE — 2500000003 HC RX 250 WO HCPCS: Performed by: HOSPITALIST

## 2020-03-17 PROCEDURE — 2580000003 HC RX 258: Performed by: PSYCHIATRY & NEUROLOGY

## 2020-03-17 PROCEDURE — 94640 AIRWAY INHALATION TREATMENT: CPT

## 2020-03-17 PROCEDURE — 82607 VITAMIN B-12: CPT

## 2020-03-17 PROCEDURE — 6370000000 HC RX 637 (ALT 250 FOR IP): Performed by: HOSPITALIST

## 2020-03-17 PROCEDURE — 2700000000 HC OXYGEN THERAPY PER DAY

## 2020-03-17 PROCEDURE — 2580000003 HC RX 258: Performed by: INTERNAL MEDICINE

## 2020-03-17 PROCEDURE — 82962 GLUCOSE BLOOD TEST: CPT

## 2020-03-17 PROCEDURE — 82803 BLOOD GASES ANY COMBINATION: CPT

## 2020-03-17 PROCEDURE — 82746 ASSAY OF FOLIC ACID SERUM: CPT

## 2020-03-17 PROCEDURE — 5A1945Z RESPIRATORY VENTILATION, 24-96 CONSECUTIVE HOURS: ICD-10-PCS | Performed by: INTERNAL MEDICINE

## 2020-03-17 PROCEDURE — 80053 COMPREHEN METABOLIC PANEL: CPT

## 2020-03-17 PROCEDURE — C1729 CATH, DRAINAGE: HCPCS

## 2020-03-17 PROCEDURE — 2000000000 HC ICU R&B

## 2020-03-17 PROCEDURE — 94761 N-INVAS EAR/PLS OXIMETRY MLT: CPT

## 2020-03-17 PROCEDURE — 76604 US EXAM CHEST: CPT

## 2020-03-17 PROCEDURE — 36556 INSERT NON-TUNNEL CV CATH: CPT

## 2020-03-17 PROCEDURE — 94750 HC PULMONARY COMPLIANCE STUDY: CPT

## 2020-03-17 PROCEDURE — 82248 BILIRUBIN DIRECT: CPT

## 2020-03-17 PROCEDURE — 2500000003 HC RX 250 WO HCPCS: Performed by: INTERNAL MEDICINE

## 2020-03-17 PROCEDURE — 71045 X-RAY EXAM CHEST 1 VIEW: CPT

## 2020-03-17 PROCEDURE — 6360000002 HC RX W HCPCS: Performed by: NURSE PRACTITIONER

## 2020-03-17 PROCEDURE — 2709999900 HC NON-CHARGEABLE SUPPLY

## 2020-03-17 PROCEDURE — 0BH17EZ INSERTION OF ENDOTRACHEAL AIRWAY INTO TRACHEA, VIA NATURAL OR ARTIFICIAL OPENING: ICD-10-PCS | Performed by: INTERNAL MEDICINE

## 2020-03-17 PROCEDURE — 36600 WITHDRAWAL OF ARTERIAL BLOOD: CPT

## 2020-03-17 PROCEDURE — 89220 SPUTUM SPECIMEN COLLECTION: CPT

## 2020-03-17 PROCEDURE — 94003 VENT MGMT INPAT SUBQ DAY: CPT

## 2020-03-17 PROCEDURE — 99232 SBSQ HOSP IP/OBS MODERATE 35: CPT | Performed by: NURSE PRACTITIONER

## 2020-03-17 RX ORDER — PROPOFOL 10 MG/ML
10 INJECTION, EMULSION INTRAVENOUS
Status: DISCONTINUED | OUTPATIENT
Start: 2020-03-17 | End: 2020-03-18

## 2020-03-17 RX ORDER — PROPOFOL 10 MG/ML
INJECTION, EMULSION INTRAVENOUS
Status: DISPENSED
Start: 2020-03-17 | End: 2020-03-18

## 2020-03-17 RX ORDER — LORAZEPAM 2 MG/ML
2 INJECTION INTRAMUSCULAR ONCE
Status: COMPLETED | OUTPATIENT
Start: 2020-03-17 | End: 2020-03-17

## 2020-03-17 RX ORDER — IPRATROPIUM BROMIDE AND ALBUTEROL SULFATE 2.5; .5 MG/3ML; MG/3ML
1 SOLUTION RESPIRATORY (INHALATION)
Status: DISCONTINUED | OUTPATIENT
Start: 2020-03-17 | End: 2020-03-17

## 2020-03-17 RX ADMIN — LORAZEPAM 1 MG: 1 TABLET ORAL at 19:58

## 2020-03-17 RX ADMIN — ASPIRIN 81 MG 81 MG: 81 TABLET ORAL at 10:42

## 2020-03-17 RX ADMIN — IPRATROPIUM BROMIDE AND ALBUTEROL SULFATE 1 AMPULE: .5; 3 SOLUTION RESPIRATORY (INHALATION) at 14:57

## 2020-03-17 RX ADMIN — METRONIDAZOLE 500 MG: 500 INJECTION, SOLUTION INTRAVENOUS at 05:14

## 2020-03-17 RX ADMIN — QUETIAPINE FUMARATE 25 MG: 25 TABLET ORAL at 10:42

## 2020-03-17 RX ADMIN — SODIUM CHLORIDE, PRESERVATIVE FREE 10 ML: 5 INJECTION INTRAVENOUS at 20:01

## 2020-03-17 RX ADMIN — DEXTROSE 50 % IN WATER (D50W) INTRAVENOUS SYRINGE 12.5 G: at 20:12

## 2020-03-17 RX ADMIN — FUROSEMIDE 20 MG: 10 INJECTION, SOLUTION INTRAVENOUS at 18:41

## 2020-03-17 RX ADMIN — Medication 2 PUFF: at 07:20

## 2020-03-17 RX ADMIN — ZIPRASIDONE MESYLATE 10 MG: 20 INJECTION, POWDER, LYOPHILIZED, FOR SOLUTION INTRAMUSCULAR at 16:51

## 2020-03-17 RX ADMIN — POTASSIUM CHLORIDE AND SODIUM CHLORIDE: 450; 150 INJECTION, SOLUTION INTRAVENOUS at 19:58

## 2020-03-17 RX ADMIN — FUROSEMIDE 20 MG: 10 INJECTION, SOLUTION INTRAVENOUS at 10:41

## 2020-03-17 RX ADMIN — ALBUTEROL SULFATE 4 PUFF: 90 AEROSOL, METERED RESPIRATORY (INHALATION) at 07:18

## 2020-03-17 RX ADMIN — PANTOPRAZOLE SODIUM 40 MG: 40 INJECTION, POWDER, FOR SOLUTION INTRAVENOUS at 10:42

## 2020-03-17 RX ADMIN — THIAMINE HYDROCHLORIDE 100 MG: 100 INJECTION, SOLUTION INTRAMUSCULAR; INTRAVENOUS at 10:42

## 2020-03-17 RX ADMIN — Medication 200 MCG/HR: at 01:35

## 2020-03-17 RX ADMIN — Medication 10 ML: at 10:43

## 2020-03-17 RX ADMIN — METRONIDAZOLE 500 MG: 500 INJECTION, SOLUTION INTRAVENOUS at 19:58

## 2020-03-17 RX ADMIN — PROPOFOL 10 MCG/KG/MIN: 10 INJECTION, EMULSION INTRAVENOUS at 17:20

## 2020-03-17 RX ADMIN — IPRATROPIUM BROMIDE AND ALBUTEROL SULFATE 1 AMPULE: .5; 3 SOLUTION RESPIRATORY (INHALATION) at 19:09

## 2020-03-17 RX ADMIN — METRONIDAZOLE 500 MG: 500 INJECTION, SOLUTION INTRAVENOUS at 14:21

## 2020-03-17 RX ADMIN — Medication 175 MCG/HR: at 14:15

## 2020-03-17 RX ADMIN — Medication 200 MCG/HR: at 20:23

## 2020-03-17 RX ADMIN — VANCOMYCIN HYDROCHLORIDE 1250 MG: 5 INJECTION, POWDER, LYOPHILIZED, FOR SOLUTION INTRAVENOUS at 05:14

## 2020-03-17 RX ADMIN — DEXTROSE MONOHYDRATE 100 ML/HR: 50 INJECTION, SOLUTION INTRAVENOUS at 20:14

## 2020-03-17 RX ADMIN — MIDAZOLAM HYDROCHLORIDE 10 MG/HR: 5 INJECTION, SOLUTION INTRAMUSCULAR; INTRAVENOUS at 03:42

## 2020-03-17 RX ADMIN — LORAZEPAM 1 MG: 1 TABLET ORAL at 10:42

## 2020-03-17 RX ADMIN — Medication 400 MG: at 19:58

## 2020-03-17 RX ADMIN — Medication 10 ML: at 20:01

## 2020-03-17 RX ADMIN — Medication 400 MG: at 10:42

## 2020-03-17 RX ADMIN — MIDAZOLAM HYDROCHLORIDE 5 MG/HR: 5 INJECTION, SOLUTION INTRAMUSCULAR; INTRAVENOUS at 16:00

## 2020-03-17 RX ADMIN — Medication 175 MCG/HR: at 07:05

## 2020-03-17 RX ADMIN — LORAZEPAM 2 MG: 2 INJECTION, SOLUTION INTRAMUSCULAR; INTRAVENOUS at 17:12

## 2020-03-17 RX ADMIN — CHLORHEXIDINE GLUCONATE 0.12% ORAL RINSE 15 ML: 1.2 LIQUID ORAL at 10:41

## 2020-03-17 RX ADMIN — ENOXAPARIN SODIUM 30 MG: 30 INJECTION SUBCUTANEOUS at 10:42

## 2020-03-17 RX ADMIN — LORAZEPAM 2 MG: 2 INJECTION INTRAMUSCULAR; INTRAVENOUS at 15:35

## 2020-03-17 RX ADMIN — SODIUM CHLORIDE, PRESERVATIVE FREE 10 ML: 5 INJECTION INTRAVENOUS at 20:00

## 2020-03-17 RX ADMIN — CHLORHEXIDINE GLUCONATE 0.12% ORAL RINSE 15 ML: 1.2 LIQUID ORAL at 19:58

## 2020-03-17 RX ADMIN — QUETIAPINE FUMARATE 25 MG: 25 TABLET ORAL at 19:58

## 2020-03-17 ASSESSMENT — PULMONARY FUNCTION TESTS
PIF_VALUE: 15
PIF_VALUE: 28
PIF_VALUE: 28
PIF_VALUE: 22
PIF_VALUE: 40
PIF_VALUE: 37
PIF_VALUE: 20
PIF_VALUE: 25
PIF_VALUE: 27
PIF_VALUE: 29
PIF_VALUE: 16

## 2020-03-17 ASSESSMENT — PAIN SCALES - GENERAL
PAINLEVEL_OUTOF10: 0

## 2020-03-17 NOTE — PROGRESS NOTES
JA at the bedside. Dr. Brian Alberts at the bedside. Dr. Brian Alberts states not enough fluid for a russell thora at this time. Dr. Destini Carrero viewed with Bedside US.

## 2020-03-17 NOTE — PROGRESS NOTES
Attempted to wean patient again but when vent switched to PS, patient became apneic immediately. Patient placed back on SIMV.

## 2020-03-17 NOTE — PROGRESS NOTES
ANIOn gap metabolic acidosis:  from lactic aicdosis//Bicarb level of 9 on admission //Serum acetaminophen and salicylate, ethanol and BHA were negative.////nolrmal osmolal gap of -7//resolved with volume expansion  - Acute hypoxic resp failure  - ?Polysusbstance abuse?: amphetamines positive as of now  - Hyperglycemia  - Leukocytosis/fevers  - Hyperammonemia  - bradycardia  - encephalopathy  - fluid overload     Plan;  - cr stable at 1.2  - Continue gentle diuresis with lasix  - will continue to Monitor for signs of overdiuresis  - Critically ill  - Will follow                    Electronically signed by Ulices Lazo DO on 3/17/2020 at MD Isidro Posada DO [address]  [place], [place] [address]  PHONE: [phone]  FAX: [phone]

## 2020-03-17 NOTE — PROGRESS NOTES
Infectious Disease Progress Note  3/17/2020   Patient Name: Cali Love : 1965   Impression  · Severe Sepsis, Streptococcus Pneumoniae:  ? Fever max 101.5, now afebrile >48 hours  ? Leukocytosis 19.4 initially, now down to 7.3  ? Sputum culture 3/8-Strep pneumoniae, Micrococcus Species, Gamma Strep  ? CSF culture 3/9-NGTD, Spinal Fluid analysis RBC 28, WBC 2, Lymphs 100, Segs 0  ? Blood Culture 3/8-(2 sets) 0/-NGTD  ? HSV PCR 3/10-Negative  ? RDP 3/8-Negative  ? Procalictonin -8.06, -0.18, 3/13-0.74, 3/15-0.353, 3/17-0.204  ? CRP 3/.1, 3/.7, 3/17-15.4  ? Imaging: 3/13/20-CXR reviewed, layering bilateral pleural effusions . ? CT A&P 3/12-No perforation or drainable fluid collection, uncomplicated distal descending colonic diverticulitis, trace likely free fluid in the pelvis. New bibasilar dependent consolidations compatible with atelectasis vs pneumonia vs aspiration. Small bilateral pleural effusions. Possibility of cirrhosis. · Subconjunctival Hemorrhage: Sclera and conjunctiva reddened and edematous, consultation for ophthalmology, Dr. Mumtaz Mata 3/16, impression of likely due to being on vent, no signs of orbital hemorrhage, should self resolve    · Acute Hypoxic Respiratory Failure:  Pulmonology onboard, Dr. Janet Estes,  Intubated on  mechanical ventilation 3/8, attempted to wean 3/12- without success  ? AR:  CTS onboard, Dr. Gala Floyd, will need LHC pending resolution of JUSTIN and resp failure to determine best timing for AVR  ? Diverticulitis:   ? JUSTIN from ATN: Nephrology onboard, Dr. Nati Baker, improving  ? Anion Gap Metabolic Acidosis:  resolved  ? Toxic/Metabolic Encehalopathy:  Neurology onboard, Dr. Dewey Hansen, abnormal EEG, continuing to r/o meningitis/encephalitis. MRI brain no acute abnormality. HSV negative  ?  Bradycardia:  Cardio onboard, Dr. Rosi Aguilar, 2:1 HB, plan for PPM once extubated, underwent LHC 3/8/20: EDP 25-30, left main patent, LAD, cir, ramus and RCA all mild 3/13/20 XR Chest Portable:  Impression   Support tubes and lines as above.  Layering bilateral pleural effusions   adjacent atelectasis. 3/16/20 XR Chest Portable:  Impression   Support lines in good position       Stable congestive changes.        Labs:    Recent Results (from the past 24 hour(s))   Basic Metabolic Panel    Collection Time: 03/16/20  9:50 AM   Result Value Ref Range    Sodium 144 135 - 145 MMOL/L    Potassium 3.8 3.5 - 5.1 MMOL/L    Chloride 106 99 - 110 mMol/L    CO2 31 21 - 32 MMOL/L    Anion Gap 7 4 - 16    BUN 14 6 - 23 MG/DL    CREATININE 1.2 0.9 - 1.3 MG/DL    Glucose 107 (H) 70 - 99 MG/DL    Calcium 8.3 8.3 - 10.6 MG/DL    GFR Non-African American >60 >60 mL/min/1.73m2    GFR African American >60 >60 mL/min/1.73m2   CBC    Collection Time: 03/16/20  9:50 AM   Result Value Ref Range    WBC 7.1 4.0 - 10.5 K/CU MM    RBC 3.46 (L) 4.6 - 6.2 M/CU MM    Hemoglobin 10.2 (L) 13.5 - 18.0 GM/DL    Hematocrit 33.1 (L) 42 - 52 %    MCV 95.7 78 - 100 FL    MCH 29.5 27 - 31 PG    MCHC 30.8 (L) 32.0 - 36.0 %    RDW 15.3 (H) 11.7 - 14.9 %    Platelets 429 099 - 634 K/CU MM    MPV 9.7 7.5 - 11.1 FL   Hepatic Function Panel    Collection Time: 03/16/20  9:50 AM   Result Value Ref Range    Alb 2.5 (L) 3.4 - 5.0 GM/DL    Total Bilirubin 0.3 0.0 - 1.0 MG/DL    Bilirubin, Direct 0.2 0.0 - 0.3 MG/DL    Bilirubin, Indirect 0.1 0 - 0.7 MG/DL    Alkaline Phosphatase 102 40 - 129 IU/L    AST 32 15 - 37 IU/L    ALT 29 10 - 40 U/L    Total Protein 4.7 (L) 6.4 - 8.2 GM/DL   POCT Glucose    Collection Time: 03/16/20  9:50 AM   Result Value Ref Range    POC Glucose 113 (H) 70 - 99 MG/DL   Blood Gas, Arterial    Collection Time: 03/16/20 11:15 AM   Result Value Ref Range    pH, Bld 7.42 7.34 - 7.45    pCO2, Arterial 51.0 (H) 32 - 45 MMHG    pO2, Arterial 70 (L) 75 - 100 MMHG    Base Exc, Mixed 7.2  PLUS   (H) 0 - 1.2    HCO3, Arterial 33.1 (H) 18 - 23 MMOL/L    CO2 Content 34.7 (H) 19 - 24 MMOL/L    O2 Sat 93.9 (L) 96 - 97 %    Carbon Monoxide, Blood 1.5 0 - 5 %    Methemoglobin, Arterial 0.7 <1.5 %    Comment IMV 10 500 PS15 .4 5P    POCT Glucose    Collection Time: 03/16/20  4:47 PM   Result Value Ref Range    POC Glucose 144 (H) 70 - 99 MG/DL   POCT Glucose    Collection Time: 03/16/20  9:09 PM   Result Value Ref Range    POC Glucose 107 (H) 70 - 99 MG/DL   Procalcitonin    Collection Time: 03/17/20 12:05 AM   Result Value Ref Range    Procalcitonin 3.297    Basic Metabolic Panel    Collection Time: 03/17/20  4:10 AM   Result Value Ref Range    Sodium 143 135 - 145 MMOL/L    Potassium 4.1 3.5 - 5.1 MMOL/L    Chloride 103 99 - 110 mMol/L    CO2 30 21 - 32 MMOL/L    Anion Gap 10 4 - 16    BUN 21 6 - 23 MG/DL    CREATININE 1.2 0.9 - 1.3 MG/DL    Glucose 134 (H) 70 - 99 MG/DL    Calcium 8.2 (L) 8.3 - 10.6 MG/DL    GFR Non-African American >60 >60 mL/min/1.73m2    GFR African American >60 >60 mL/min/1.73m2   CBC    Collection Time: 03/17/20  4:10 AM   Result Value Ref Range    WBC 7.3 4.0 - 10.5 K/CU MM    RBC 3.39 (L) 4.6 - 6.2 M/CU MM    Hemoglobin 9.8 (L) 13.5 - 18.0 GM/DL    Hematocrit 33.2 (L) 42 - 52 %    MCV 97.9 78 - 100 FL    MCH 28.9 27 - 31 PG    MCHC 29.5 (L) 32.0 - 36.0 %    RDW 15.1 (H) 11.7 - 14.9 %    Platelets 807 124 - 947 K/CU MM    MPV 9.5 7.5 - 11.1 FL   Hepatic Function Panel    Collection Time: 03/17/20  4:10 AM   Result Value Ref Range    Alb 2.6 (L) 3.4 - 5.0 GM/DL    Total Bilirubin 0.2 0.0 - 1.0 MG/DL    Bilirubin, Direct 0.2 0.0 - 0.3 MG/DL    Bilirubin, Indirect 0.0 0 - 0.7 MG/DL    Alkaline Phosphatase 107 40 - 129 IU/L    AST 27 15 - 37 IU/L    ALT 27 10 - 40 U/L    Total Protein 4.8 (L) 6.4 - 8.2 GM/DL   Procalcitonin    Collection Time: 03/17/20  4:10 AM   Result Value Ref Range    Procalcitonin 0.204    C-reactive protein    Collection Time: 03/17/20  4:10 AM   Result Value Ref Range    CRP, High Sensitivity 15.4 mg/L   Vancomycin, trough    Collection Time: 03/17/20  4:10 AM   Result Value Ref

## 2020-03-17 NOTE — PROGRESS NOTES
Cardiology Progress Note       Aminah Lowery is a 62 y.o. male   1965     SUBJECTIVE:   Patient seen and examined still on the vent rhythm is second-degree AV block type II hemodynamically stable  OBJECTIVE:    Review of Systems:  General appearance: alert, appears stated age and cooperative  Skin: Skin color, texture, normal. No rashes or lesions  HEENT: No nose bleed, headache, vision problems  CV: C/O chest pain, tightness, pressure,   Respiratory: C/o no SOB, JOHNS, Orthopnea, PND  GI: No abdominal pain, black stool, bloating  Limbs: No c/o edema, pain, swelling, intermittent claudication, joint pains  Neuro: No dizziness, lightheadedness, syncope, gait problems, memory problems  Psych: grossly normal. No SI/depression. Vitals:   Blood pressure (!) 114/45, pulse 83, temperature 99.2 °F (37.3 °C), temperature source Rectal, resp. rate 10, height (S) 5' 10\" (1.778 m), weight 213 lb 13.5 oz (97 kg), SpO2 93 %.     HEENT: AT, NC, PERRLA  Neck: No JVD  Heart: S1 S2 audible, no murmur   Lungs: CTA   Abdomen: Nontender   Limbs: No edema   CNS: no focal deficit      Past Medical History:   Diagnosis Date    Anxiety         Patient Active Problem List   Diagnosis    Anxiety    Sepsis ([facility] [address]    Metabolic encephalopathy    Delirium    Hyperammonemia (HCC)    Hyperglycemia    Diverticulitis of colon    Acute respiratory failure with hypoxia (HCC)        No Known Allergies     Current Inpatient Medications:    Current Facility-Administered Medications   Medication Dose Route Frequency Provider Last Rate Last Dose    dexmedetomidine (PRECEDEX) 400 mcg in sodium chloride 0.9 % 100 mL infusion  0.2 mcg/kg/hr Intravenous Continuous Conrado Bauman MD        ipratropium-albuterol (DUONEB) nebulizer solution 1 ampule  1 ampule Inhalation Q4H WA Conrado Bauman MD        thiamine (B-1) 100 mg in sodium chloride 0.9 % 50 mL IVPB  100 mg Intravenous Daily Matthew Jeronimo  mL/hr at 03/17/20 1042 100 mg at 03/17/20 1042    ipratropium-albuterol (DUONEB) nebulizer solution 1 ampule  1 ampule Inhalation Q4H PRN Conrado Finn MD        LORazepam (ATIVAN) tablet 1 mg  1 mg Oral BID Jorgito Jacobs MD   1 mg at 03/17/20 1042    magnesium oxide (MAG-OX) tablet 400 mg  400 mg Oral BID Jorgito Jacobs MD   400 mg at 03/17/20 1042    LORazepam (ATIVAN) injection 2 mg  2 mg Intravenous Q4H PRN Vishal Gutierrez MD   2 mg at 03/16/20 1846    magnesium sulfate 1 g in dextrose 5% 100 mL IVPB  1 g Intravenous PRN Vishal Gutierrez MD        furosemide (LASIX) injection 20 mg  20 mg Intravenous BID Chintan Finley MD   20 mg at 03/17/20 1041    QUEtiapine (SEROQUEL) tablet 25 mg  25 mg Per NG tube BID Vishal Gutierrez MD   25 mg at 03/17/20 1042    metronidazole (FLAGYL) 500 mg in NaCl 100 mL IVPB premix  500 mg Intravenous Q8H Vishal Gutierrez MD   Stopped at 03/17/20 0626    0.45 % NaCl with KCl 20 mEq infusion   Intravenous Continuous Chintan Finley MD 10 mL/hr at 03/14/20 0918      ziprasidone (GEODON) injection 10 mg  10 mg Intramuscular Q6H PRN Vishal Gutierrez MD   10 mg at 03/16/20 1846    enoxaparin (LOVENOX) injection 30 mg  30 mg Subcutaneous Daily ROSALIA Nix - CNP   30 mg at 03/17/20 1042    carboxymethylcellulose (THERATEARS) 1 % ophthalmic gel 1 drop  1 drop Both Eyes Q2H PRN Vishal Gutierrez MD   1 drop at 03/15/20 2237    sodium chloride flush 0.9 % injection 10 mL  10 mL Intravenous 2 times per day Jorgito Jacobs MD   10 mL at 03/16/20 0826    sodium chloride flush 0.9 % injection 10 mL  10 mL Intravenous PRN Jorgito Jacobs MD   10 mL at 03/13/20 1812    acetaminophen (TYLENOL) tablet 650 mg  650 mg Oral Q4H PRN Jorgito Jacobs MD        sodium chloride flush 0.9 % injection 10 mL  10 mL Intravenous BID Jorgito Jacobs MD   10 mL at 03/17/20 1043    glucose (GLUTOSE) 40 % oral gel 15 g  15 g Oral PRN Jorgito Jacobs MD        dextrose 50 % IV solution  12.5 g Intravenous PRN Jorgito Jacobs MD   12.5 g at albuterol sulfate  (90 Base) MCG/ACT inhaler 4 puff  4 puff Inhalation Q4H WA Ulises Hancock MD   4 puff at 03/17/20 0718           Labs:  CBC with Differential:    Lab Results   Component Value Date    WBC 7.3 03/17/2020    RBC 3.39 03/17/2020    HGB 9.8 03/17/2020    HCT 33.2 03/17/2020     03/17/2020    MCV 97.9 03/17/2020    MCH 28.9 03/17/2020    MCHC 29.5 03/17/2020    RDW 15.1 03/17/2020    SEGSPCT 68.0 03/15/2020    BANDSPCT 12 03/15/2020    LYMPHOPCT 11.0 03/15/2020    MONOPCT 9.0 03/15/2020    BASOPCT 1.1 03/14/2020    MONOSABS 0.7 03/15/2020    LYMPHSABS 0.8 03/15/2020    EOSABS 0.2 03/14/2020    BASOSABS 0.1 03/14/2020    DIFFTYPE MANUAL DIFFERENTIAL 03/15/2020     CMP:    Lab Results   Component Value Date     03/17/2020    K 4.1 03/17/2020     03/17/2020    CO2 30 03/17/2020    BUN 21 03/17/2020    CREATININE 1.2 03/17/2020    GFRAA >60 03/17/2020    AGRATIO 1.7 09/12/2019    LABGLOM >60 03/17/2020    GLUCOSE 134 03/17/2020    PROT 4.8 03/17/2020    LABALBU 2.6 03/17/2020    CALCIUM 8.2 03/17/2020    BILITOT 0.2 03/17/2020    ALKPHOS 107 03/17/2020    AST 27 03/17/2020    ALT 27 03/17/2020     Hepatic Function Panel:    Lab Results   Component Value Date    ALKPHOS 107 03/17/2020    ALT 27 03/17/2020    AST 27 03/17/2020    PROT 4.8 03/17/2020    BILITOT 0.2 03/17/2020    BILIDIR 0.2 03/17/2020    IBILI 0.0 03/17/2020    LABALBU 2.6 03/17/2020     Magnesium:    Lab Results   Component Value Date    MG 1.6 03/15/2020     PT/INR:    Lab Results   Component Value Date    PROTIME 15.3 03/08/2020    INR 1.26 03/08/2020     Last 3 Troponin:  No results found for: TROPONINI  U/A:    Lab Results   Component Value Date    COLORU YELLOW 03/08/2020    WBCUA 10 03/08/2020    RBCUA 24 03/08/2020    MUCUS RARE 03/08/2020    TRICHOMONAS NONE SEEN 03/08/2020    BACTERIA OCCASIONAL 03/08/2020    CLARITYU HAZY 03/08/2020    SPECGRAV 1.032 03/08/2020    SPECGRAV  03/08/2020     (NOTE)  CONSIDER

## 2020-03-17 NOTE — PROGRESS NOTES
Hospitalist Progress Note      Name:  Denys Ruby /Age/Sex: 1965  (62 y.o. male)   MRN & CSN:  [id number] & [id number] Admission Date/Time: 3/8/2020  3:44 AM   Location:  -A PCP: No primary care provider on file. Hospital Day: 10    Assessment and Plan:   Denys Ruby is a 62 y.o.  male  who presents with <principal problem not specified>    > Severe sepsis  > Acute diverticulitis,   > Strep Pneumoniae Pneumonia  - HSV: Negative. Stopped acyclovir. CT abdomen pelvis: Diverticulitis; new bibasilar consolidations; possible cirrhosis. - rule out aseptic meningitis , CSF showed 6 WBCs and 100% lymphocytes. CSF culture: No growth to date  - Pulm, ID consulted  -Respiratory culture: Strep pneumo resistant to penicillin. Stopped Unasyn. On Rocephin and add Flagyl.   -Continue vancomycin. Blood culture: No growth. - Vanc and rocephin discontinued, resume flagyl.       >Severe Anion gap metabolic acidosis-resolved  - Nephrology following. Likely from elevated lactate sec to sepsis. Patient received sodium bicarb. - Acidosis has resolved.     >Acute respiratory failure due to pneumonia and now CHF  -CT chest non-acute, on the vent. pulm consulted. -Pulmonology would like to attempt extubation on patient, however patient is requiring significant sedation due to agitation. Unable to use Precedex due to bradycardia. -CXR: Congestive changes. Started on IV Lasix.    -Diuresed at least 3 L.  CXR: Stable congestion.     >Acute metabolic encephalopathy, Likely multifactorial from hyperammonemia, amphetamine use, acute kidney injury, sepsis. - Ammonia level:470, but quickly normalized. CT head non-acute. - Neuro consulted. CSF: slightly elevated WBCs. - PRN Ativan and Geodon. Agitation may be due to possible alcohol withdrawal.  EEG: Severe diffuse slowing of the background. MRI brain without contrast: No acute process.   - Started Seroquel, PRN Ativan to use in between

## 2020-03-17 NOTE — PROGRESS NOTES
Pulmonary and Critical Care  Progress Note    Subjective: The patient is awake off sedation. Shortness of breath none. Chest pain none. Addressing respiratory complaints Patient is negative for  hemoptysis and cyanosis  CONSTITUTIONAL:  negative for fevers and chills      Past Medical History:     has a past medical history of Anxiety. has no past surgical history on file. reports that he has never smoked. His smokeless tobacco use includes snuff. He reports current alcohol use. He reports that he does not use drugs. Family history:  family history includes Cancer in his father; Coronary Art Dis in his mother; Diabetes in an other family member. No Known Allergies  Social History:    Reviewed; no changes    Objective:   PHYSICAL EXAM:        VITALS:  BP (!) 114/45   Pulse 83   Temp 99.2 °F (37.3 °C) (Rectal)   Resp 10   Ht (S) 5' 10\" (1.778 m)   Wt 213 lb 13.5 oz (97 kg)   SpO2 93%   BMI 30.68 kg/m²     24HR INTAKE/OUTPUT:      Intake/Output Summary (Last 24 hours) at 3/17/2020 1102  Last data filed at 3/17/2020 1001  Gross per 24 hour   Intake 2467.38 ml   Output 1350 ml   Net 1117.38 ml       CONSTITUTIONAL:  Sedated on vent. LUNGS:  decreased breath sounds, occ basilar crackles. CARDIOVASCULAR:  normal S1 and S2 and negative JVD  ABD:Abdomen soft, non-tender. BS normal. No masses,  No organomegaly  NEURO:Sedated on vent.   DATA:    CBC:  Recent Labs     03/15/20  0220 03/16/20  0950 03/17/20  0410   WBC 7.6 7.1 7.3   RBC 3.28* 3.46* 3.39*   HGB 9.6* 10.2* 9.8*   HCT 32.3* 33.1* 33.2*    331 317   MCV 98.5 95.7 97.9   MCH 29.3 29.5 28.9   MCHC 29.7* 30.8* 29.5*   RDW 15.0* 15.3* 15.1*   SEGSPCT 68.0*  --   --    BANDSPCT 12*  --   --       BMP:  Recent Labs     03/15/20  0220 03/16/20  0950 03/17/20  0410    144 143   K 4.7 3.8 4.1    106 103   CO2 23 31 30   BUN 17 14 21   CREATININE 1.2 1.2 1.2   CALCIUM 8.3 8.3 8.2*   GLUCOSE 96 107* 134*      ABG:  Recent Labs 03/15/20  1300 03/16/20  1115 03/17/20  0700   PH 7.43 7.42 7.37   PO2ART 72* 70* 77   [id number] 44.0 51.0* 59.0*   O2SAT 93.7* 93.9* 94.5*     Lab Results   Component Value Date    PROBNP 722.7 (H) 03/13/2020     No results found for: [facility]    Radiology Review:  Pertinent images / reports were reviewed as a part of this visit. Assessment:     Patient Active Problem List   Diagnosis    Anxiety    Sepsis ([address]    Metabolic encephalopathy    Delirium    Hyperammonemia (HCC)    Hyperglycemia    Diverticulitis of colon    Acute respiratory failure with hypoxia ([address]       Plan:   1. Wean per protocol. 2. Bipap post extubation.   Rahul Mak MD  3/17/2020  11:02 AM

## 2020-03-18 ENCOUNTER — APPOINTMENT (OUTPATIENT)
Dept: GENERAL RADIOLOGY | Age: 55
DRG: 853 | End: 2020-03-18
Payer: COMMERCIAL

## 2020-03-18 LAB
ALBUMIN SERPL-MCNC: 2.7 GM/DL (ref 3.4–5)
ALP BLD-CCNC: 94 IU/L (ref 40–129)
ALT SERPL-CCNC: 31 U/L (ref 10–40)
ANION GAP SERPL CALCULATED.3IONS-SCNC: 11 MMOL/L (ref 4–16)
AST SERPL-CCNC: 42 IU/L (ref 15–37)
BASE EXCESS MIXED: ABNORMAL (ref 0–1.2)
BILIRUB SERPL-MCNC: 0.3 MG/DL (ref 0–1)
BILIRUBIN DIRECT: 0.2 MG/DL (ref 0–0.3)
BILIRUBIN, INDIRECT: 0.1 MG/DL (ref 0–0.7)
BUN BLDV-MCNC: 21 MG/DL (ref 6–23)
CALCIUM SERPL-MCNC: 8.1 MG/DL (ref 8.3–10.6)
CARBON MONOXIDE, BLOOD: 1.9 % (ref 0–5)
CHLORIDE BLD-SCNC: 104 MMOL/L (ref 99–110)
CO2 CONTENT: 33.4 MMOL/L (ref 19–24)
CO2: 29 MMOL/L (ref 21–32)
COMMENT: ABNORMAL
CREAT SERPL-MCNC: 1.1 MG/DL (ref 0.9–1.3)
GFR AFRICAN AMERICAN: >60 ML/MIN/1.73M2
GFR NON-AFRICAN AMERICAN: >60 ML/MIN/1.73M2
GLUCOSE BLD-MCNC: 106 MG/DL (ref 70–99)
GLUCOSE BLD-MCNC: 107 MG/DL (ref 70–99)
GLUCOSE BLD-MCNC: 108 MG/DL (ref 70–99)
GLUCOSE BLD-MCNC: 86 MG/DL (ref 70–99)
GLUCOSE BLD-MCNC: 88 MG/DL (ref 70–99)
HCO3 ARTERIAL: 32 MMOL/L (ref 18–23)
HCT VFR BLD CALC: 30.7 % (ref 42–52)
HEMOGLOBIN: 9.2 GM/DL (ref 13.5–18)
MCH RBC QN AUTO: 29.4 PG (ref 27–31)
MCHC RBC AUTO-ENTMCNC: 30 % (ref 32–36)
MCV RBC AUTO: 98.1 FL (ref 78–100)
METHEMOGLOBIN ARTERIAL: 0.9 %
O2 SATURATION: 94.3 % (ref 96–97)
PCO2 ARTERIAL: 46 MMHG (ref 32–45)
PDW BLD-RTO: 15.4 % (ref 11.7–14.9)
PH BLOOD: 7.45 (ref 7.34–7.45)
PLATELET # BLD: 286 K/CU MM (ref 140–440)
PMV BLD AUTO: 9.9 FL (ref 7.5–11.1)
PO2 ARTERIAL: 79 MMHG (ref 75–100)
POTASSIUM SERPL-SCNC: 4 MMOL/L (ref 3.5–5.1)
RBC # BLD: 3.13 M/CU MM (ref 4.6–6.2)
SODIUM BLD-SCNC: 144 MMOL/L (ref 135–145)
TOTAL PROTEIN: 4.8 GM/DL (ref 6.4–8.2)
WBC # BLD: 8.1 K/CU MM (ref 4–10.5)

## 2020-03-18 PROCEDURE — 94761 N-INVAS EAR/PLS OXIMETRY MLT: CPT

## 2020-03-18 PROCEDURE — 94003 VENT MGMT INPAT SUBQ DAY: CPT

## 2020-03-18 PROCEDURE — 6360000002 HC RX W HCPCS: Performed by: INTERNAL MEDICINE

## 2020-03-18 PROCEDURE — 6370000000 HC RX 637 (ALT 250 FOR IP): Performed by: INTERNAL MEDICINE

## 2020-03-18 PROCEDURE — 2500000003 HC RX 250 WO HCPCS: Performed by: INTERNAL MEDICINE

## 2020-03-18 PROCEDURE — 6360000002 HC RX W HCPCS: Performed by: PHYSICIAN ASSISTANT

## 2020-03-18 PROCEDURE — 85027 COMPLETE CBC AUTOMATED: CPT

## 2020-03-18 PROCEDURE — 84145 PROCALCITONIN (PCT): CPT

## 2020-03-18 PROCEDURE — 6360000002 HC RX W HCPCS: Performed by: HOSPITALIST

## 2020-03-18 PROCEDURE — 2000000000 HC ICU R&B

## 2020-03-18 PROCEDURE — 82248 BILIRUBIN DIRECT: CPT

## 2020-03-18 PROCEDURE — C9113 INJ PANTOPRAZOLE SODIUM, VIA: HCPCS | Performed by: INTERNAL MEDICINE

## 2020-03-18 PROCEDURE — 2580000003 HC RX 258: Performed by: INTERNAL MEDICINE

## 2020-03-18 PROCEDURE — 6360000002 HC RX W HCPCS: Performed by: PSYCHIATRY & NEUROLOGY

## 2020-03-18 PROCEDURE — 99233 SBSQ HOSP IP/OBS HIGH 50: CPT | Performed by: NURSE PRACTITIONER

## 2020-03-18 PROCEDURE — 36600 WITHDRAWAL OF ARTERIAL BLOOD: CPT

## 2020-03-18 PROCEDURE — 6360000002 HC RX W HCPCS: Performed by: NURSE PRACTITIONER

## 2020-03-18 PROCEDURE — 94640 AIRWAY INHALATION TREATMENT: CPT

## 2020-03-18 PROCEDURE — 82803 BLOOD GASES ANY COMBINATION: CPT

## 2020-03-18 PROCEDURE — 80053 COMPREHEN METABOLIC PANEL: CPT

## 2020-03-18 PROCEDURE — 71045 X-RAY EXAM CHEST 1 VIEW: CPT

## 2020-03-18 PROCEDURE — 82962 GLUCOSE BLOOD TEST: CPT

## 2020-03-18 PROCEDURE — 2700000000 HC OXYGEN THERAPY PER DAY

## 2020-03-18 PROCEDURE — 2500000003 HC RX 250 WO HCPCS: Performed by: HOSPITALIST

## 2020-03-18 PROCEDURE — 2580000003 HC RX 258: Performed by: PSYCHIATRY & NEUROLOGY

## 2020-03-18 PROCEDURE — 94750 HC PULMONARY COMPLIANCE STUDY: CPT

## 2020-03-18 PROCEDURE — 6370000000 HC RX 637 (ALT 250 FOR IP): Performed by: HOSPITALIST

## 2020-03-18 RX ORDER — ATROPINE SULFATE 1 MG/ML
0.5 INJECTION, SOLUTION INTRAMUSCULAR; INTRAVENOUS; SUBCUTANEOUS ONCE
Status: COMPLETED | OUTPATIENT
Start: 2020-03-18 | End: 2020-03-18

## 2020-03-18 RX ORDER — DIPHENHYDRAMINE HYDROCHLORIDE 50 MG/ML
50 INJECTION INTRAMUSCULAR; INTRAVENOUS ONCE
Status: COMPLETED | OUTPATIENT
Start: 2020-03-18 | End: 2020-03-18

## 2020-03-18 RX ORDER — LORAZEPAM 2 MG/ML
2 INJECTION INTRAMUSCULAR ONCE
Status: COMPLETED | OUTPATIENT
Start: 2020-03-18 | End: 2020-03-18

## 2020-03-18 RX ORDER — LORAZEPAM 2 MG/ML
1 INJECTION INTRAMUSCULAR EVERY 4 HOURS
Status: DISCONTINUED | OUTPATIENT
Start: 2020-03-18 | End: 2020-03-19

## 2020-03-18 RX ORDER — IPRATROPIUM BROMIDE AND ALBUTEROL SULFATE 2.5; .5 MG/3ML; MG/3ML
1 SOLUTION RESPIRATORY (INHALATION)
Status: DISCONTINUED | OUTPATIENT
Start: 2020-03-18 | End: 2020-03-24 | Stop reason: HOSPADM

## 2020-03-18 RX ADMIN — LORAZEPAM 2 MG: 2 INJECTION INTRAMUSCULAR; INTRAVENOUS at 10:48

## 2020-03-18 RX ADMIN — Medication 200 MCG/HR: at 02:00

## 2020-03-18 RX ADMIN — DIPHENHYDRAMINE HYDROCHLORIDE 50 MG: 50 INJECTION INTRAMUSCULAR; INTRAVENOUS at 21:28

## 2020-03-18 RX ADMIN — METRONIDAZOLE 500 MG: 500 INJECTION, SOLUTION INTRAVENOUS at 13:26

## 2020-03-18 RX ADMIN — SODIUM CHLORIDE, PRESERVATIVE FREE 10 ML: 5 INJECTION INTRAVENOUS at 10:14

## 2020-03-18 RX ADMIN — LORAZEPAM 1 MG: 2 INJECTION, SOLUTION INTRAMUSCULAR; INTRAVENOUS at 20:36

## 2020-03-18 RX ADMIN — Medication 200 MCG/HR: at 06:53

## 2020-03-18 RX ADMIN — IPRATROPIUM BROMIDE AND ALBUTEROL SULFATE 1 AMPULE: .5; 3 SOLUTION RESPIRATORY (INHALATION) at 18:23

## 2020-03-18 RX ADMIN — SODIUM CHLORIDE, PRESERVATIVE FREE 10 ML: 5 INJECTION INTRAVENOUS at 20:36

## 2020-03-18 RX ADMIN — MIDAZOLAM HYDROCHLORIDE 9 MG/HR: 5 INJECTION, SOLUTION INTRAMUSCULAR; INTRAVENOUS at 02:00

## 2020-03-18 RX ADMIN — THIAMINE HYDROCHLORIDE 100 MG: 100 INJECTION, SOLUTION INTRAMUSCULAR; INTRAVENOUS at 10:17

## 2020-03-18 RX ADMIN — FUROSEMIDE 20 MG: 10 INJECTION, SOLUTION INTRAVENOUS at 17:18

## 2020-03-18 RX ADMIN — METRONIDAZOLE 500 MG: 500 INJECTION, SOLUTION INTRAVENOUS at 04:13

## 2020-03-18 RX ADMIN — FUROSEMIDE 20 MG: 10 INJECTION, SOLUTION INTRAVENOUS at 10:08

## 2020-03-18 RX ADMIN — PANTOPRAZOLE SODIUM 40 MG: 40 INJECTION, POWDER, FOR SOLUTION INTRAVENOUS at 10:14

## 2020-03-18 RX ADMIN — ATROPINE SULFATE 0.5 MG: 1 INJECTION, SOLUTION INTRAMUSCULAR; INTRAVENOUS; SUBCUTANEOUS at 14:47

## 2020-03-18 RX ADMIN — Medication 10 ML: at 10:15

## 2020-03-18 RX ADMIN — CHLORHEXIDINE GLUCONATE 0.12% ORAL RINSE 15 ML: 1.2 LIQUID ORAL at 10:07

## 2020-03-18 RX ADMIN — SODIUM CHLORIDE, PRESERVATIVE FREE 10 ML: 5 INJECTION INTRAVENOUS at 10:15

## 2020-03-18 RX ADMIN — ENOXAPARIN SODIUM 30 MG: 30 INJECTION SUBCUTANEOUS at 10:07

## 2020-03-18 RX ADMIN — Medication 400 MG: at 10:08

## 2020-03-18 RX ADMIN — QUETIAPINE FUMARATE 25 MG: 25 TABLET ORAL at 10:08

## 2020-03-18 RX ADMIN — Medication 125 MCG/HR: at 13:16

## 2020-03-18 RX ADMIN — ATROPINE SULFATE 0.5 MG: 1 INJECTION, SOLUTION INTRAMUSCULAR; INTRAVENOUS; SUBCUTANEOUS at 16:58

## 2020-03-18 RX ADMIN — ASPIRIN 81 MG 81 MG: 81 TABLET ORAL at 10:08

## 2020-03-18 RX ADMIN — MIDAZOLAM HYDROCHLORIDE 5 MG/HR: 5 INJECTION, SOLUTION INTRAMUSCULAR; INTRAVENOUS at 13:15

## 2020-03-18 RX ADMIN — LORAZEPAM 1 MG: 1 TABLET ORAL at 10:08

## 2020-03-18 RX ADMIN — LORAZEPAM 2 MG: 2 INJECTION INTRAMUSCULAR; INTRAVENOUS at 16:53

## 2020-03-18 RX ADMIN — CHLORHEXIDINE GLUCONATE 0.12% ORAL RINSE 15 ML: 1.2 LIQUID ORAL at 20:35

## 2020-03-18 RX ADMIN — Medication 10 ML: at 20:37

## 2020-03-18 RX ADMIN — ZIPRASIDONE MESYLATE 10 MG: 20 INJECTION, POWDER, LYOPHILIZED, FOR SOLUTION INTRAMUSCULAR at 21:10

## 2020-03-18 RX ADMIN — LORAZEPAM 2 MG: 2 INJECTION INTRAMUSCULAR; INTRAVENOUS at 21:28

## 2020-03-18 ASSESSMENT — PULMONARY FUNCTION TESTS
PIF_VALUE: 32
PIF_VALUE: 26
PIF_VALUE: 29
PIF_VALUE: 34
PIF_VALUE: 16
PIF_VALUE: 23
PIF_VALUE: 34
PIF_VALUE: 27
PIF_VALUE: 30
PIF_VALUE: 26
PIF_VALUE: 28
PIF_VALUE: 26
PIF_VALUE: 27
PIF_VALUE: 37
PIF_VALUE: 28
PIF_VALUE: 34
PIF_VALUE: 27
PIF_VALUE: 28
PIF_VALUE: 30
PIF_VALUE: 27
PIF_VALUE: 29
PIF_VALUE: 33

## 2020-03-18 ASSESSMENT — PAIN SCALES - GENERAL
PAINLEVEL_OUTOF10: 0

## 2020-03-18 NOTE — PLAN OF CARE
Problem: Falls - Risk of:  Goal: Will remain free from falls  Description: Will remain free from falls  Outcome: Ongoing  Goal: Absence of physical injury  Description: Absence of physical injury  Outcome: Ongoing     Problem: Restraint Use - Nonviolent/Non-Self-Destructive Behavior:  Goal: Absence of restraint indications  Description: Absence of restraint indications  Outcome: Ongoing  Goal: Absence of restraint-related injury  Description: Absence of restraint-related injury  Outcome: Ongoing     Problem: Nutrition  Goal: Optimal nutrition therapy  Outcome: Ongoing     Problem: Pain:  Goal: Pain level will decrease  Description: Pain level will decrease  Outcome: Ongoing  Goal: Control of acute pain  Description: Control of acute pain  Outcome: Ongoing  Goal: Control of chronic pain  Description: Control of chronic pain  Outcome: Ongoing

## 2020-03-18 NOTE — PROGRESS NOTES
03/17/20  0700 03/18/20  0800   PH 7.42 7.37 7.45   PO2ART 70* 77 79   [id number] 51.0* 59.0* 46.0*   O2SAT 93.9* 94.5* 94.3*     Lab Results   Component Value Date    PROBNP 722.7 (H) 03/13/2020     No results found for: [facility]    Radiology Review:  Pertinent images / reports were reviewed as a part of this visit. Assessment:     Patient Active Problem List   Diagnosis    Anxiety    Sepsis ([address]    Metabolic encephalopathy    Delirium    Hyperammonemia (HCC)    Hyperglycemia    Diverticulitis of colon    Acute respiratory failure with hypoxia ([facility] [address]       Plan:   1. Wean sedation. 2. Wean per protocol.   Zenaida Miller MD  3/18/2020  10:34 AM

## 2020-03-18 NOTE — PROGRESS NOTES
Edward Roberts MD   Stopped at 03/17/20 1935    thiamine (B-1) 100 mg in sodium chloride 0.9 % 50 mL IVPB  100 mg Intravenous Daily Mya Hensley MD   Stopped at 03/18/20 1050    ipratropium-albuterol (DUONEB) nebulizer solution 1 ampule  1 ampule Inhalation Q4H PRN Conrado Hoang MD   1 ampule at 03/17/20 1909    LORazepam (ATIVAN) tablet 1 mg  1 mg Oral BID Madeleine Gamino MD   1 mg at 03/18/20 1008    magnesium oxide (MAG-OX) tablet 400 mg  400 mg Oral BID Madeleine Gamino MD   400 mg at 03/18/20 1008    LORazepam (ATIVAN) injection 2 mg  2 mg Intravenous Q4H PRN Liudmila Ramires MD   2 mg at 03/18/20 1048    magnesium sulfate 1 g in dextrose 5% 100 mL IVPB  1 g Intravenous PRN Liudmila Ramires MD        furosemide (LASIX) injection 20 mg  20 mg Intravenous BID Horacio Valdovinos DO   20 mg at 03/18/20 1008    QUEtiapine (SEROQUEL) tablet 25 mg  25 mg Per NG tube BID Liudmila Ramires MD   25 mg at 03/18/20 1008    metronidazole (FLAGYL) 500 mg in NaCl 100 mL IVPB premix  500 mg Intravenous Q8H Liudmila Ramires MD   Stopped at 03/18/20 0515    ziprasidone (GEODON) injection 10 mg  10 mg Intramuscular Q6H PRN Liudmila Ramires MD   10 mg at 03/17/20 1651    enoxaparin (LOVENOX) injection 30 mg  30 mg Subcutaneous Daily Lima Winkler APRN - CNP   30 mg at 03/18/20 1007    carboxymethylcellulose (THERATEARS) 1 % ophthalmic gel 1 drop  1 drop Both Eyes Q2H PRN Liudmila Ramires MD   1 drop at 03/15/20 2237    sodium chloride flush 0.9 % injection 10 mL  10 mL Intravenous 2 times per day Madeleine Gamino MD   10 mL at 03/18/20 1014    sodium chloride flush 0.9 % injection 10 mL  10 mL Intravenous PRN Madeleine Gamino MD   10 mL at 03/13/20 1812    acetaminophen (TYLENOL) tablet 650 mg  650 mg Oral Q4H PRN Madeleine Gamino MD        sodium chloride flush 0.9 % injection 10 mL  10 mL Intravenous BID Madeleine Gamino MD   10 mL at 03/18/20 1015    glucose (GLUTOSE) 40 % oral gel 15 g  15 g Oral PRN MD Tonya Tarango dextrose 50 % IV solution  12.5 g Intravenous PRN Philippe Russell MD   12.5 g at 03/17/20 2012    glucagon (rDNA) injection 1 mg  1 mg Intramuscular PRN Philippe Russell MD        dextrose 5 % solution  100 mL/hr Intravenous PRN Philippe Russell  mL/hr at 03/17/20 2014 100 mL/hr at 03/17/20 2014    sodium chloride flush 0.9 % injection 10 mL  10 mL Intravenous 2 times per day Philippe Russell MD   10 mL at 03/18/20 1015    sodium chloride flush 0.9 % injection 10 mL  10 mL Intravenous PRN Philippe Russell MD        acetaminophen (TYLENOL) suppository 650 mg  650 mg Rectal Q6H PRN Philippe Russell MD   650 mg at 03/09/20 1951    pantoprazole (PROTONIX) injection 40 mg  40 mg Intravenous Daily Philippe Russell MD   40 mg at 03/18/20 1014    insulin lispro (HUMALOG) injection vial 0-12 Units  0-12 Units Subcutaneous Q6H Philippe Russell MD   Stopped at 03/08/20 0858    chlorhexidine (PERIDEX) 0.12 % solution 15 mL  15 mL Mouth/Throat BID Philippe Russell MD   15 mL at 03/18/20 1007    midazolam (VERSED) 100 mg in dextrose 5 % 100 mL infusion  1 mg/hr Intravenous Continuous Philippe Russell MD 6 mL/hr at 03/18/20 1203 6 mg/hr at 03/18/20 1203    calcium gluconate 1 g in dextrose 5 % 100 mL IVPB  1 g Intravenous PRN Philippe Russell MD   Stopped at 03/12/20 1107    Or    calcium gluconate 2 g in dextrose 5 % 100 mL IVPB  2 g Intravenous PRN Philippe Russell MD        Or    calcium gluconate 3 g in dextrose 5 % 100 mL IVPB  3 g Intravenous PRN Philippe Russell MD        Or    calcium gluconate 4 g in dextrose 5 % 100 mL IVPB  4 g Intravenous PRN Philippe Russell MD        sodium phosphate 16.02 mmol in dextrose 5 % 250 mL IVPB  0.16 mmol/kg Intravenous PRHIEN Russell MD        Or    sodium phosphate 32.07 mmol in dextrose 5 % 250 mL IVPB  0.32 mmol/kg Intravenous PRHIEN Russell MD        aspirin chewable tablet 81 mg  81 mg Oral Daily Aimee Lisa MD   81 mg at 03/18/20 1008    fentanyl (SUBLIMAZE) 1,000 mcg in sodium chloride 0.9% 100 mL infusion  25 mcg/hr Intravenous Continuous Jeremias Soto MD 15 mL/hr at 03/18/20 1158 150 mcg/hr at 03/18/20 1158           Labs:  CBC with Differential:    Lab Results   Component Value Date    WBC 8.1 03/18/2020    RBC 3.13 03/18/2020    HGB 9.2 03/18/2020    HCT 30.7 03/18/2020     03/18/2020    MCV 98.1 03/18/2020    MCH 29.4 03/18/2020    MCHC 30.0 03/18/2020    RDW 15.4 03/18/2020    SEGSPCT 68.0 03/15/2020    BANDSPCT 12 03/15/2020    LYMPHOPCT 11.0 03/15/2020    MONOPCT 9.0 03/15/2020    BASOPCT 1.1 03/14/2020    MONOSABS 0.7 03/15/2020    LYMPHSABS 0.8 03/15/2020    EOSABS 0.2 03/14/2020    BASOSABS 0.1 03/14/2020    DIFFTYPE MANUAL DIFFERENTIAL 03/15/2020     CMP:    Lab Results   Component Value Date     03/18/2020    K 4.0 03/18/2020     03/18/2020    CO2 29 03/18/2020    BUN 21 03/18/2020    CREATININE 1.1 03/18/2020    GFRAA >60 03/18/2020    AGRATIO 1.7 09/12/2019    LABGLOM >60 03/18/2020    GLUCOSE 106 03/18/2020    PROT 4.8 03/18/2020    LABALBU 2.7 03/18/2020    CALCIUM 8.1 03/18/2020    BILITOT 0.3 03/18/2020    ALKPHOS 94 03/18/2020    AST 42 03/18/2020    ALT 31 03/18/2020     Hepatic Function Panel:    Lab Results   Component Value Date    ALKPHOS 94 03/18/2020    ALT 31 03/18/2020    AST 42 03/18/2020    PROT 4.8 03/18/2020    BILITOT 0.3 03/18/2020    BILIDIR 0.2 03/18/2020    IBILI 0.1 03/18/2020    LABALBU 2.7 03/18/2020     Magnesium:    Lab Results   Component Value Date    MG 1.6 03/15/2020     PT/INR:    Lab Results   Component Value Date    PROTIME 15.3 03/08/2020    INR 1.26 03/08/2020     Last 3 Troponin:  No results found for: TROPONINI  U/A:    Lab Results   Component Value Date    COLORU YELLOW 03/08/2020    WBCUA 10 03/08/2020    RBCUA 24 03/08/2020    MUCUS RARE 03/08/2020    TRICHOMONAS NONE SEEN 03/08/2020    BACTERIA OCCASIONAL 03/08/2020    CLARITYU HAZY 03/08/2020    SPECGRAV 1.032 03/08/2020    SPECGRAV  03/08/2020     (NOTE)  CONSIDER URINE

## 2020-03-18 NOTE — PROGRESS NOTES
Barbara Eisenberg MD.  Section of General Neurology - Adult  Consult Note        Reason for Consult:    Requesting Physician:  No referring provider defined for this encounter. Thank you for your kind referral.    Pt continues to be obtunded    Pulmonology tried to extubate pt but could not    Pt was agitated     Past Medical History:        Diagnosis Date    Anxiety      Past Surgical History:    No past surgical history on file.   Current Medications:   Current Facility-Administered Medications: dexmedetomidine (PRECEDEX) 400 mcg in sodium chloride 0.9 % 100 mL infusion, 0.2 mcg/kg/hr, Intravenous, Continuous  propofol injection, 10 mcg/kg/min, Intravenous, Titrated  propofol 1000 MG/100ML injection, , ,   thiamine (B-1) 100 mg in sodium chloride 0.9 % 50 mL IVPB, 100 mg, Intravenous, Daily  ipratropium-albuterol (DUONEB) nebulizer solution 1 ampule, 1 ampule, Inhalation, Q4H PRN  LORazepam (ATIVAN) tablet 1 mg, 1 mg, Oral, BID  magnesium oxide (MAG-OX) tablet 400 mg, 400 mg, Oral, BID  LORazepam (ATIVAN) injection 2 mg, 2 mg, Intravenous, Q4H PRN  magnesium sulfate 1 g in dextrose 5% 100 mL IVPB, 1 g, Intravenous, PRN  furosemide (LASIX) injection 20 mg, 20 mg, Intravenous, BID  QUEtiapine (SEROQUEL) tablet 25 mg, 25 mg, Per NG tube, BID  metronidazole (FLAGYL) 500 mg in NaCl 100 mL IVPB premix, 500 mg, Intravenous, Q8H  0.45 % NaCl with KCl 20 mEq infusion, , Intravenous, Continuous  ziprasidone (GEODON) injection 10 mg, 10 mg, Intramuscular, Q6H PRN  enoxaparin (LOVENOX) injection 30 mg, 30 mg, Subcutaneous, Daily  carboxymethylcellulose (THERATEARS) 1 % ophthalmic gel 1 drop, 1 drop, Both Eyes, Q2H PRN  sodium chloride flush 0.9 % injection 10 mL, 10 mL, Intravenous, 2 times per day  sodium chloride flush 0.9 % injection 10 mL, 10 mL, Intravenous, PRN  acetaminophen (TYLENOL) tablet 650 mg, 650 mg, Oral, Q4H PRN  sodium chloride flush 0.9 % injection 10 mL, 10 mL, Intravenous, BID  glucose (GLUTOSE) 40 % oral gel 15 g, 15 g, Oral, PRN  dextrose 50 % IV solution, 12.5 g, Intravenous, PRN  glucagon (rDNA) injection 1 mg, 1 mg, Intramuscular, PRN  dextrose 5 % solution, 100 mL/hr, Intravenous, PRN  sodium chloride flush 0.9 % injection 10 mL, 10 mL, Intravenous, 2 times per day  sodium chloride flush 0.9 % injection 10 mL, 10 mL, Intravenous, PRN  [DISCONTINUED] acetaminophen (TYLENOL) tablet 650 mg, 650 mg, Oral, Q6H PRN **OR** acetaminophen (TYLENOL) suppository 650 mg, 650 mg, Rectal, Q6H PRN  pantoprazole (PROTONIX) injection 40 mg, 40 mg, Intravenous, Daily  insulin lispro (HUMALOG) injection vial 0-12 Units, 0-12 Units, Subcutaneous, Q6H  chlorhexidine (PERIDEX) 0.12 % solution 15 mL, 15 mL, Mouth/Throat, BID  midazolam (VERSED) 100 mg in dextrose 5 % 100 mL infusion, 1 mg/hr, Intravenous, Continuous  calcium gluconate 1 g in dextrose 5 % 100 mL IVPB, 1 g, Intravenous, PRN **OR** calcium gluconate 2 g in dextrose 5 % 100 mL IVPB, 2 g, Intravenous, PRN **OR** calcium gluconate 3 g in dextrose 5 % 100 mL IVPB, 3 g, Intravenous, PRN **OR** calcium gluconate 4 g in dextrose 5 % 100 mL IVPB, 4 g, Intravenous, PRN  sodium phosphate 16.02 mmol in dextrose 5 % 250 mL IVPB, 0.16 mmol/kg, Intravenous, PRN **OR** sodium phosphate 32.07 mmol in dextrose 5 % 250 mL IVPB, 0.32 mmol/kg, Intravenous, PRN  aspirin chewable tablet 81 mg, 81 mg, Oral, Daily  fentanyl (SUBLIMAZE) 1,000 mcg in sodium chloride 0.9% 100 mL infusion, 25 mcg/hr, Intravenous, Continuous  Allergies:  Patient has no known allergies. Social History:  TOBACCO:   reports that he has never smoked. His smokeless tobacco use includes snuff. ETOH:   reports current alcohol use. DRUGS:   reports no history of drug use.   Family History:       Problem Relation Age of Onset    Coronary Art Dis Mother     Cancer Father         tongue    Diabetes Other         aunt       REVIEW OF SYSTEMS:  CONSTITUTIONAL:  negative  HEENT:  negative  RESPIRATORY: 03/17/2020    CO2 30 03/17/2020    BUN 21 03/17/2020    CREATININE 1.2 03/17/2020    GFRAA >60 03/17/2020    AGRATIO 1.7 09/12/2019    LABGLOM >60 03/17/2020    GLUCOSE 134 03/17/2020    PROT 4.8 03/17/2020    LABALBU 2.6 03/17/2020    CALCIUM 8.2 03/17/2020    BILITOT 0.2 03/17/2020    ALKPHOS 107 03/17/2020    AST 27 03/17/2020    ALT 27 03/17/2020     BMP:    Lab Results   Component Value Date     03/17/2020    K 4.1 03/17/2020     03/17/2020    CO2 30 03/17/2020    BUN 21 03/17/2020    LABALBU 2.6 03/17/2020    CREATININE 1.2 03/17/2020    CALCIUM 8.2 03/17/2020    GFRAA >60 03/17/2020    LABGLOM >60 03/17/2020    GLUCOSE 134 03/17/2020     PT/INR:    Lab Results   Component Value Date    PROTIME 15.3 03/08/2020    INR 1.26 03/08/2020     PTT:  No results found for: APTT, PTT[APTT  U/A:    Lab Results   Component Value Date    COLORU YELLOW 03/08/2020    WBCUA 10 03/08/2020    RBCUA 24 03/08/2020    MUCUS RARE 03/08/2020    TRICHOMONAS NONE SEEN 03/08/2020    BACTERIA OCCASIONAL 03/08/2020    CLARITYU HAZY 03/08/2020    SPECGRAV 1.032 03/08/2020    SPECGRAV  03/08/2020     (NOTE)  CONSIDER URINE OSMOLARITY TEST IF CLINICALLY INDICATED        LEUKOCYTESUR NEGATIVE 03/08/2020    UROBILINOGEN NORMAL 03/08/2020    BILIRUBINUR NEGATIVE 03/08/2020    BLOODU MODERATE 03/08/2020     TSH:    Lab Results   Component Value Date    TSH 1.80 09/12/2019     VITAMIN B12: No components found for: B12  FOLATE:    Lab Results   Component Value Date    FOLATE 14.6 03/17/2020     RPR:  No results found for: RPR  LIANNE:  No results found for: ANATITER, LIANNE  Urine Toxicology:  No components found for: IAMMENTA, IBARBIT, IBENZO, ICOCAINE, IMARTHC, IOPIATES, IPHENCYC     IMPRESSION:    Metabolic encephalopathy    Hyperammonemia    Hyperglycemia    Acute respiratory failure    ETOH abuse    PLAN:    CT Mri brain neg    B 12 folate nl    pend TSh    Thiamine    Continue supportive care        Lupillo Buchanan MD  BOARD

## 2020-03-18 NOTE — PROGRESS NOTES
Extubated at [id number] per protocol with  RN at bed side. Placed on 4 L 02 NC. Pre extubation NIF -46, RSBI 35, Vital capacity 567  . Patient tolerating extubation well. Duoneb given after extubation. Will continue to monitor.

## 2020-03-18 NOTE — PROGRESS NOTES
0.1 0 - 0.7 MG/DL    Alkaline Phosphatase 94 40 - 129 IU/L    AST 42 (H) 15 - 37 IU/L    ALT 31 10 - 40 U/L    Total Protein 4.8 (L) 6.4 - 8.2 GM/DL   Blood Gas, Arterial    Collection Time: 03/18/20  8:00 AM   Result Value Ref Range    pH, Bld 7.45 7.34 - 7.45    pCO2, Arterial 46.0 (H) 32 - 45 MMHG    pO2, Arterial 79 75 - 100 MMHG    Base Exc, Mixed 6.9  PLUS   (H) 0 - 1.2    HCO3, Arterial 32.0 (H) 18 - 23 MMOL/L    CO2 Content 33.4 (H) 19 - 24 MMOL/L    O2 Sat 94.3 (L) 96 - 97 %    Carbon Monoxide, Blood 1.9 0 - 5 %    Methemoglobin, Arterial 0.9 <1.5 %    Comment SIMV 16 500 PS15 40% p5      CULTURE results: Invalid input(s): BLOOD CULTURE,  URINE CULTURE, SURGICAL CULTURE    Diagnosis:  Patient Active Problem List   Diagnosis    Anxiety    Sepsis ([facility] [address]    Metabolic encephalopathy    Delirium    Hyperammonemia (HCC)    Hyperglycemia    Diverticulitis of colon    Acute respiratory failure with hypoxia (HCC)       Active Problems  Active Problems:    Sepsis (HCC)    Metabolic encephalopathy    Delirium    Hyperammonemia (HCC)    Hyperglycemia    Diverticulitis of colon    Acute respiratory failure with hypoxia (HCC)  Resolved Problems:    * No resolved hospital problems. *    Electronically signed by: Electronically signed by Yobani Coburn.  ROSALIA Floyd CNP on 3/18/2020 at 8:46 AM

## 2020-03-18 NOTE — PROGRESS NOTES
Pt continues to have anxious moments/outbursts. Working to wean pt from sedation for extubation. Will continue to monitor.

## 2020-03-18 NOTE — PROGRESS NOTES
Pt extubated to 4L Nasal Cannula. RT administered breathing tx. O2 sat 94%. See flow sheet for vitals. Will continue to monitor.

## 2020-03-18 NOTE — PROGRESS NOTES
process. - on versed, fentanyl, ativan prn with CIWA , geodon prn, consider precedex and if drops HR with drop in BP  then can try atropine / external pacing prn     >NSTEMI with arrythmia/2:1 heart block  -Cardio following  -Cardiac cath: Mild CAD. Echo: 50-55%; severe eccentric aortic regurgitation. Aspirin.  -Off dopamine drip. Will likely need pacemaker once more stable. >Severe aortic regurg  need valve surgery eval once stable.     >Alcoholism  - Possible withdrawal.  On Versed , precedex,   - Ativan as needed.      >Acute kidney injury   -Nephrology following. Creatinine appears to be stable.      >Rhabdomyolysis  Possible concern for seizure. On IV fluids. CK improving.     >Anemia  Monitor hemoglobin. Stable     >Subconjunctival hemorrhage OS. With chemosis. - was seen by ophthalmology   - Likely related to being on ventilator. No signs of orbital hemorrhage.    - conservative management, expected to resolve on its own.     >Hypomagnesemia  >Hypocalcemia  Started replacement protocol. >Hyperglycemia  -Not diabetic. hemoglobin A1C: normal     Diet DIET TUBE FEED CONTINUOUS/CYCLIC NPO; Immune Enhancing (Pivot 1.5 Isaias); Nasoenteric; Continuous; 20; 60; 24  Dietary Nutrition Supplements: Snack (see comment)   DVT Prophylaxis ? Lovenox   GI Prophylaxis ? PPI,   Code Status Full Code   Disposition  pending clinical improvement      History of Present Illness:     Pt S&E.     limited due to pt sedated intubated on Vent. 10-14 point ROS limited due to pt sedated intubated on Vent. Objective: Intake/Output Summary (Last 24 hours) at 3/18/2020 0930  Last data filed at 3/18/2020 0400  Gross per 24 hour   Intake 1621 ml   Output 1030 ml   Net 591 ml      Vitals:   Vitals:    03/18/20 0750   BP:    Pulse:    Resp: 12   Temp:    SpO2:      Physical Exam:    GEN  male, intubated sedated on Vent  RESP Coarse breathing sounds on Vent. Symmetric chest movement . CARDIO/VASC S1/S2 auscultated.

## 2020-03-19 ENCOUNTER — APPOINTMENT (OUTPATIENT)
Dept: GENERAL RADIOLOGY | Age: 55
DRG: 853 | End: 2020-03-19
Payer: COMMERCIAL

## 2020-03-19 LAB
ALBUMIN SERPL-MCNC: 3.8 GM/DL (ref 3.4–5)
ALP BLD-CCNC: 118 IU/L (ref 40–129)
ALT SERPL-CCNC: 47 U/L (ref 10–40)
ANION GAP SERPL CALCULATED.3IONS-SCNC: 16 MMOL/L (ref 4–16)
ANION GAP SERPL CALCULATED.3IONS-SCNC: 20 MMOL/L (ref 4–16)
AST SERPL-CCNC: 76 IU/L (ref 15–37)
BASE EXCESS MIXED: ABNORMAL (ref 0–1.2)
BASE EXCESS MIXED: ABNORMAL (ref 0–1.2)
BILIRUB SERPL-MCNC: 0.8 MG/DL (ref 0–1)
BILIRUBIN DIRECT: 0.3 MG/DL (ref 0–0.3)
BILIRUBIN, INDIRECT: 0.5 MG/DL (ref 0–0.7)
BUN BLDV-MCNC: 21 MG/DL (ref 6–23)
BUN BLDV-MCNC: 23 MG/DL (ref 6–23)
CALCIUM SERPL-MCNC: 9.4 MG/DL (ref 8.3–10.6)
CALCIUM SERPL-MCNC: 9.8 MG/DL (ref 8.3–10.6)
CARBON MONOXIDE, BLOOD: 1.7 % (ref 0–5)
CARBON MONOXIDE, BLOOD: 1.9 % (ref 0–5)
CHLORIDE BLD-SCNC: 100 MMOL/L (ref 99–110)
CHLORIDE BLD-SCNC: 97 MMOL/L (ref 99–110)
CO2 CONTENT: 24.6 MMOL/L (ref 19–24)
CO2 CONTENT: 29 MMOL/L (ref 19–24)
CO2: 24 MMOL/L (ref 21–32)
CO2: 27 MMOL/L (ref 21–32)
COMMENT: ABNORMAL
COMMENT: ABNORMAL
CREAT SERPL-MCNC: 1.2 MG/DL (ref 0.9–1.3)
CREAT SERPL-MCNC: 1.3 MG/DL (ref 0.9–1.3)
EKG ATRIAL RATE: 101 BPM
EKG ATRIAL RATE: 61 BPM
EKG DIAGNOSIS: NORMAL
EKG DIAGNOSIS: NORMAL
EKG P AXIS: 65 DEGREES
EKG P AXIS: 74 DEGREES
EKG Q-T INTERVAL: 508 MS
EKG Q-T INTERVAL: 546 MS
EKG QRS DURATION: 140 MS
EKG QRS DURATION: 140 MS
EKG QTC CALCULATION (BAZETT): 444 MS
EKG QTC CALCULATION (BAZETT): 466 MS
EKG R AXIS: -55 DEGREES
EKG R AXIS: -56 DEGREES
EKG T AXIS: 22 DEGREES
EKG T AXIS: 57 DEGREES
EKG VENTRICULAR RATE: 44 BPM
EKG VENTRICULAR RATE: 46 BPM
GFR AFRICAN AMERICAN: >60 ML/MIN/1.73M2
GFR AFRICAN AMERICAN: >60 ML/MIN/1.73M2
GFR NON-AFRICAN AMERICAN: 57 ML/MIN/1.73M2
GFR NON-AFRICAN AMERICAN: >60 ML/MIN/1.73M2
GLUCOSE BLD-MCNC: 107 MG/DL (ref 70–99)
GLUCOSE BLD-MCNC: 117 MG/DL (ref 70–99)
GLUCOSE BLD-MCNC: 117 MG/DL (ref 70–99)
GLUCOSE BLD-MCNC: 125 MG/DL (ref 70–99)
GLUCOSE BLD-MCNC: 140 MG/DL (ref 70–99)
HCO3 ARTERIAL: 23.6 MMOL/L (ref 18–23)
HCO3 ARTERIAL: 27.9 MMOL/L (ref 18–23)
HCT VFR BLD CALC: 36.3 % (ref 42–52)
HEMOGLOBIN: 11.1 GM/DL (ref 13.5–18)
MAGNESIUM: 1.9 MG/DL (ref 1.8–2.4)
MAGNESIUM: 2.3 MG/DL (ref 1.8–2.4)
MCH RBC QN AUTO: 28.9 PG (ref 27–31)
MCHC RBC AUTO-ENTMCNC: 30.6 % (ref 32–36)
MCV RBC AUTO: 94.5 FL (ref 78–100)
METHEMOGLOBIN ARTERIAL: 0.8 %
METHEMOGLOBIN ARTERIAL: 0.9 %
O2 SATURATION: 86.6 % (ref 96–97)
O2 SATURATION: 91.8 % (ref 96–97)
PCO2 ARTERIAL: 31 MMHG (ref 32–45)
PCO2 ARTERIAL: 35 MMHG (ref 32–45)
PDW BLD-RTO: 15.4 % (ref 11.7–14.9)
PH BLOOD: 7.49 (ref 7.34–7.45)
PH BLOOD: 7.51 (ref 7.34–7.45)
PLATELET # BLD: 445 K/CU MM (ref 140–440)
PMV BLD AUTO: 10 FL (ref 7.5–11.1)
PO2 ARTERIAL: 49 MMHG (ref 75–100)
PO2 ARTERIAL: 62 MMHG (ref 75–100)
POTASSIUM SERPL-SCNC: 4 MMOL/L (ref 3.5–5.1)
POTASSIUM SERPL-SCNC: 4.7 MMOL/L (ref 3.5–5.1)
PROCALCITONIN: 0.18
PROCALCITONIN: 0.21
RBC # BLD: 3.84 M/CU MM (ref 4.6–6.2)
SODIUM BLD-SCNC: 140 MMOL/L (ref 135–145)
SODIUM BLD-SCNC: 144 MMOL/L (ref 135–145)
TOTAL PROTEIN: 6.5 GM/DL (ref 6.4–8.2)
TROPONIN T: 0.09 NG/ML
WBC # BLD: 21.3 K/CU MM (ref 4–10.5)

## 2020-03-19 PROCEDURE — 87040 BLOOD CULTURE FOR BACTERIA: CPT

## 2020-03-19 PROCEDURE — 6360000002 HC RX W HCPCS: Performed by: NURSE PRACTITIONER

## 2020-03-19 PROCEDURE — 6360000002 HC RX W HCPCS: Performed by: INTERNAL MEDICINE

## 2020-03-19 PROCEDURE — C1887 CATHETER, GUIDING: HCPCS

## 2020-03-19 PROCEDURE — 6370000000 HC RX 637 (ALT 250 FOR IP): Performed by: INTERNAL MEDICINE

## 2020-03-19 PROCEDURE — 6360000002 HC RX W HCPCS

## 2020-03-19 PROCEDURE — 6360000002 HC RX W HCPCS: Performed by: PHYSICIAN ASSISTANT

## 2020-03-19 PROCEDURE — 2700000000 HC OXYGEN THERAPY PER DAY

## 2020-03-19 PROCEDURE — 93005 ELECTROCARDIOGRAM TRACING: CPT | Performed by: HOSPITALIST

## 2020-03-19 PROCEDURE — 85027 COMPLETE CBC AUTOMATED: CPT

## 2020-03-19 PROCEDURE — 80053 COMPREHEN METABOLIC PANEL: CPT

## 2020-03-19 PROCEDURE — 2580000003 HC RX 258: Performed by: PSYCHIATRY & NEUROLOGY

## 2020-03-19 PROCEDURE — 93308 TTE F-UP OR LMTD: CPT

## 2020-03-19 PROCEDURE — 6360000002 HC RX W HCPCS: Performed by: PSYCHIATRY & NEUROLOGY

## 2020-03-19 PROCEDURE — 5A1213Z PERFORMANCE OF CARDIAC PACING, INTERMITTENT: ICD-10-PCS | Performed by: INTERNAL MEDICINE

## 2020-03-19 PROCEDURE — 5A2204Z RESTORATION OF CARDIAC RHYTHM, SINGLE: ICD-10-PCS | Performed by: INTERNAL MEDICINE

## 2020-03-19 PROCEDURE — 99233 SBSQ HOSP IP/OBS HIGH 50: CPT | Performed by: NURSE PRACTITIONER

## 2020-03-19 PROCEDURE — 36592 COLLECT BLOOD FROM PICC: CPT

## 2020-03-19 PROCEDURE — 2580000003 HC RX 258: Performed by: INTERNAL MEDICINE

## 2020-03-19 PROCEDURE — 6360000002 HC RX W HCPCS: Performed by: HOSPITALIST

## 2020-03-19 PROCEDURE — 82962 GLUCOSE BLOOD TEST: CPT

## 2020-03-19 PROCEDURE — 80048 BASIC METABOLIC PNL TOTAL CA: CPT

## 2020-03-19 PROCEDURE — 93005 ELECTROCARDIOGRAM TRACING: CPT | Performed by: PHYSICIAN ASSISTANT

## 2020-03-19 PROCEDURE — 82803 BLOOD GASES ANY COMBINATION: CPT

## 2020-03-19 PROCEDURE — 93010 ELECTROCARDIOGRAM REPORT: CPT | Performed by: INTERNAL MEDICINE

## 2020-03-19 PROCEDURE — 2580000003 HC RX 258

## 2020-03-19 PROCEDURE — 71045 X-RAY EXAM CHEST 1 VIEW: CPT

## 2020-03-19 PROCEDURE — 6360000004 HC RX CONTRAST MEDICATION

## 2020-03-19 PROCEDURE — 92610 EVALUATE SWALLOWING FUNCTION: CPT

## 2020-03-19 PROCEDURE — 36600 WITHDRAWAL OF ARTERIAL BLOOD: CPT

## 2020-03-19 PROCEDURE — 2709999900 HC NON-CHARGEABLE SUPPLY

## 2020-03-19 PROCEDURE — 83735 ASSAY OF MAGNESIUM: CPT

## 2020-03-19 PROCEDURE — 82248 BILIRUBIN DIRECT: CPT

## 2020-03-19 PROCEDURE — C1894 INTRO/SHEATH, NON-LASER: HCPCS

## 2020-03-19 PROCEDURE — C9113 INJ PANTOPRAZOLE SODIUM, VIA: HCPCS | Performed by: INTERNAL MEDICINE

## 2020-03-19 PROCEDURE — 33210 INSERT ELECTRD/PM CATH SNGL: CPT

## 2020-03-19 PROCEDURE — 84484 ASSAY OF TROPONIN QUANT: CPT

## 2020-03-19 PROCEDURE — 2500000003 HC RX 250 WO HCPCS: Performed by: INTERNAL MEDICINE

## 2020-03-19 PROCEDURE — 94640 AIRWAY INHALATION TREATMENT: CPT

## 2020-03-19 PROCEDURE — 5A12012 PERFORMANCE OF CARDIAC OUTPUT, SINGLE, MANUAL: ICD-10-PCS | Performed by: INTERNAL MEDICINE

## 2020-03-19 PROCEDURE — 2720000010 HC SURG SUPPLY STERILE

## 2020-03-19 PROCEDURE — 2000000000 HC ICU R&B

## 2020-03-19 PROCEDURE — 84145 PROCALCITONIN (PCT): CPT

## 2020-03-19 RX ORDER — LORAZEPAM 2 MG/ML
1 INJECTION INTRAMUSCULAR EVERY 4 HOURS PRN
Status: DISCONTINUED | OUTPATIENT
Start: 2020-03-19 | End: 2020-03-19

## 2020-03-19 RX ORDER — DIAZEPAM 5 MG/1
5 TABLET ORAL EVERY 6 HOURS PRN
Status: DISCONTINUED | OUTPATIENT
Start: 2020-03-19 | End: 2020-03-24 | Stop reason: HOSPADM

## 2020-03-19 RX ORDER — LORAZEPAM 2 MG/ML
2 INJECTION INTRAMUSCULAR ONCE
Status: COMPLETED | OUTPATIENT
Start: 2020-03-19 | End: 2020-03-19

## 2020-03-19 RX ORDER — SODIUM CHLORIDE 9 MG/ML
INJECTION, SOLUTION INTRAVENOUS CONTINUOUS
Status: DISCONTINUED | OUTPATIENT
Start: 2020-03-19 | End: 2020-03-20

## 2020-03-19 RX ORDER — MAGNESIUM SULFATE IN WATER 40 MG/ML
2 INJECTION, SOLUTION INTRAVENOUS ONCE
Status: COMPLETED | OUTPATIENT
Start: 2020-03-19 | End: 2020-03-19

## 2020-03-19 RX ADMIN — IPRATROPIUM BROMIDE AND ALBUTEROL SULFATE 1 AMPULE: .5; 3 SOLUTION RESPIRATORY (INHALATION) at 19:37

## 2020-03-19 RX ADMIN — METOPROLOL TARTRATE 50 MG: 25 TABLET, FILM COATED ORAL at 20:19

## 2020-03-19 RX ADMIN — Medication 400 MG: at 10:29

## 2020-03-19 RX ADMIN — Medication 400 MG: at 20:19

## 2020-03-19 RX ADMIN — ZIPRASIDONE MESYLATE 10 MG: 20 INJECTION, POWDER, LYOPHILIZED, FOR SOLUTION INTRAMUSCULAR at 14:16

## 2020-03-19 RX ADMIN — SODIUM CHLORIDE, PRESERVATIVE FREE 10 ML: 5 INJECTION INTRAVENOUS at 20:20

## 2020-03-19 RX ADMIN — LORAZEPAM 2 MG: 2 INJECTION, SOLUTION INTRAMUSCULAR; INTRAVENOUS at 17:16

## 2020-03-19 RX ADMIN — ENOXAPARIN SODIUM 30 MG: 30 INJECTION SUBCUTANEOUS at 10:29

## 2020-03-19 RX ADMIN — SODIUM CHLORIDE: 9 INJECTION, SOLUTION INTRAVENOUS at 18:53

## 2020-03-19 RX ADMIN — Medication 10 ML: at 10:31

## 2020-03-19 RX ADMIN — LORAZEPAM 1 MG: 2 INJECTION, SOLUTION INTRAMUSCULAR; INTRAVENOUS at 12:15

## 2020-03-19 RX ADMIN — LORAZEPAM 1 MG: 2 INJECTION, SOLUTION INTRAMUSCULAR; INTRAVENOUS at 08:40

## 2020-03-19 RX ADMIN — SODIUM CHLORIDE, PRESERVATIVE FREE 10 ML: 5 INJECTION INTRAVENOUS at 10:30

## 2020-03-19 RX ADMIN — MAGNESIUM SULFATE HEPTAHYDRATE 2 G: 40 INJECTION, SOLUTION INTRAVENOUS at 06:41

## 2020-03-19 RX ADMIN — THIAMINE HYDROCHLORIDE 100 MG: 100 INJECTION, SOLUTION INTRAMUSCULAR; INTRAVENOUS at 14:04

## 2020-03-19 RX ADMIN — PANTOPRAZOLE SODIUM 40 MG: 40 INJECTION, POWDER, FOR SOLUTION INTRAVENOUS at 10:29

## 2020-03-19 RX ADMIN — IPRATROPIUM BROMIDE AND ALBUTEROL SULFATE 1 AMPULE: .5; 3 SOLUTION RESPIRATORY (INHALATION) at 08:38

## 2020-03-19 RX ADMIN — ASPIRIN 81 MG 81 MG: 81 TABLET ORAL at 10:29

## 2020-03-19 RX ADMIN — DEXMEDETOMIDINE 0.2 MCG/KG/HR: 100 INJECTION, SOLUTION, CONCENTRATE INTRAVENOUS at 18:47

## 2020-03-19 RX ADMIN — LORAZEPAM 1 MG: 2 INJECTION, SOLUTION INTRAMUSCULAR; INTRAVENOUS at 00:40

## 2020-03-19 RX ADMIN — SODIUM CHLORIDE, PRESERVATIVE FREE 10 ML: 5 INJECTION INTRAVENOUS at 10:29

## 2020-03-19 RX ADMIN — Medication 10 ML: at 20:21

## 2020-03-19 RX ADMIN — LORAZEPAM 1 MG: 2 INJECTION, SOLUTION INTRAMUSCULAR; INTRAVENOUS at 04:33

## 2020-03-19 ASSESSMENT — PAIN SCALES - GENERAL
PAINLEVEL_OUTOF10: 0

## 2020-03-19 NOTE — SIGNIFICANT EVENT
Call addressed around: 3/19/2020 6:31 AM    Reason for call:   Code blue/rapid response  Physical Exam:   Vitals:    03/19/20 0500   BP:    Pulse: (!) 49   Resp: 16   Temp:    SpO2:       Mental status: A&Ox3, no lateralizing weakness  CV:Bradycardic  Lungs: CTA    Medical decision making/Orders placed:   Code blue was called due to patient being in vfib for approximately 31 seconds. Patient became unresponsive and turned pale. Before compressions were started, vfib broke and patient became alert, patient's rhythm went back to previous heart block with rate in high 40s. BP and other vitals stable. Dr. Carri Williamson was also present. Patient denied chest pain. I spoke with Dr. Ansley Yanes who recommended ABG, recheck Mag and BMP, EKG, troponin, and give 2 g magnesium.        Malgorzata Giordano PA-C  Hospitalist

## 2020-03-19 NOTE — PROGRESS NOTES
Pulmonary and Critical Care  Progress Note    Subjective: The patient is doing better, off vent. Had episode of V. Fib this am.Didnt require cardioversion. Shortness of breath none. Chest pain none. Addressing respiratory complaints Patient is negative for  hemoptysis and cyanosis. CONSTITUTIONAL:  negative for fevers and chills. Past Medical History:     has a past medical history of Anxiety. has no past surgical history on file. reports that he has never smoked. His smokeless tobacco use includes snuff. He reports current alcohol use. He reports that he does not use drugs. Family history:  family history includes Cancer in his father; Coronary Art Dis in his mother; Diabetes in an other family member. No Known Allergies  Social History:    Reviewed; no changes    Objective:   PHYSICAL EXAM:        VITALS:  BP (!) 164/60   Pulse (!) 46   Temp 98 °F (36.7 °C) (Axillary)   Resp 16   Ht (S) 5' 10\" (1.778 m)   Wt 213 lb 3 oz (96.7 kg)   SpO2 97%   BMI 30.59 kg/m²     24HR INTAKE/OUTPUT:      Intake/Output Summary (Last 24 hours) at 3/19/2020 1047  Last data filed at 3/19/2020 0430  Gross per 24 hour   Intake 445 ml   Output 2125 ml   Net -1680 ml       CONSTITUTIONAL: Alert. LUNGS:  decreased breath sounds, occ basilar crackles. CARDIOVASCULAR:  normal S1 and S2 and negative JVD  ABD:Abdomen soft, non-tender. BS normal. No masses,  No organomegaly  NEURO:Sedated on vent.   DATA:    CBC:  Recent Labs     03/17/20  0410 03/18/20  0420 03/19/20  0500   WBC 7.3 8.1 21.3*   RBC 3.39* 3.13* 3.84*   HGB 9.8* 9.2* 11.1*   HCT 33.2* 30.7* 36.3*    286 445*   MCV 97.9 98.1 94.5   MCH 28.9 29.4 28.9   MCHC 29.5* 30.0* 30.6*   RDW 15.1* 15.4* 15.4*      BMP:  Recent Labs     03/17/20  0410 03/18/20  0420 03/19/20  0500    144 144   K 4.1 4.0 4.7    104 100   CO2 30 29 24   BUN 21 21 23   CREATININE 1.2 1.1 1.3   CALCIUM 8.2* 8.1* 9.8   GLUCOSE 134* 106* 140*      ABG:  Recent Labs

## 2020-03-19 NOTE — PROGRESS NOTES
Yes  Behavior/Cognition: Alert;Agitated;Confused  Respiratory Status: O2 via nasual cannula  O2 Device: Nasal cannula  Communication Observation: Functional  Follows Directions: Simple  Dentition: Adequate  Patient Positioning: Upright in bed  Baseline Vocal Quality: Dysphonic;Normal  Volitional Cough: Weak  Prior Dysphagia History: unknown   Consistencies Administered: Thin - teaspoon; Thin - straw; Ice Chips;Dysphagia Minced and Moist (Dysphagia II); Dysphagia Pureed (Dysphagia I)           Vision/Hearing       Oral Motor Deficits  Oral/Motor  Oral Motor: Within functional limits    Oral Phase Dysfunction  Oral Phase  Oral Phase: Exceptions  Oral Phase Dysfunction  Impaired Mastication: Soft Solid  Lingual/Palatal Residue: Pudding;Mechanical soft     Indicators of Pharyngeal Phase Dysfunction   Pharyngeal Phase  Pharyngeal Phase: Exceptions  Indicators of Pharyngeal Phase Dysfunction  Decreased Laryngeal Elevation: All  Throat Clearing - Immediate: Puree  Cough - Delayed: Soft Solid    Prognosis  Prognosis  Prognosis for safe diet advancement: good  Barriers to reach goals: time post onset  Individuals consulted  Consulted and agree with results and recommendations: Patient;RN    Education  Patient Education: recommendations/POC  Patient Education Response: Needs reinforcement  Safety Devices in place: Yes  Type of devices:  All fall risk precautions in place       Therapy Time  SLP Individual Minutes  Time In: 0945  Time Out: 7448  Minutes: [address], [address], [address] [facility], 3/19/2020

## 2020-03-19 NOTE — OP NOTE
[id number]--[id number] pacer wire placed  No complications  Rate 306  10 brandi amps    Start on lopressor 50mg bid

## 2020-03-19 NOTE — PROGRESS NOTES
mL/hr, Intravenous, PRN  sodium chloride flush 0.9 % injection 10 mL, 10 mL, Intravenous, 2 times per day  sodium chloride flush 0.9 % injection 10 mL, 10 mL, Intravenous, PRN  [DISCONTINUED] acetaminophen (TYLENOL) tablet 650 mg, 650 mg, Oral, Q6H PRN **OR** acetaminophen (TYLENOL) suppository 650 mg, 650 mg, Rectal, Q6H PRN  pantoprazole (PROTONIX) injection 40 mg, 40 mg, Intravenous, Daily  insulin lispro (HUMALOG) injection vial 0-12 Units, 0-12 Units, Subcutaneous, Q6H  chlorhexidine (PERIDEX) 0.12 % solution 15 mL, 15 mL, Mouth/Throat, BID  aspirin chewable tablet 81 mg, 81 mg, Oral, Daily  Allergies:  Patient has no known allergies. Social History:  TOBACCO:   reports that he has never smoked. His smokeless tobacco use includes snuff. ETOH:   reports current alcohol use. DRUGS:   reports no history of drug use. Family History:       Problem Relation Age of Onset    Coronary Art Dis Mother     Cancer Father         tongue    Diabetes Other         aunt       REVIEW OF SYSTEMS:  CONSTITUTIONAL:  negative  HEENT:  negative  RESPIRATORY:  negative  CARDIOVASCULAR:  negative  GASTROINTESTINAL:  negative  GENITOURINARY:  negative  MUSCULOSKELETAL:  negative  BEHAVIOR/PSYCH:  Negative    ROS unavailable    Family hx neg    PHYSICAL EXAM  ------------------------  Vitals:  BP (!) 172/72   Pulse 51   Temp 97.7 °F (36.5 °C) (Rectal)   Resp 23   Ht (S) 5' 10\" (1.778 m)   Wt 213 lb 6.5 oz (96.8 kg)   SpO2 95%   BMI 30.62 kg/m²      General: drowsy  Well developed, well nourished, well groomed. No apparent distress. HEENT:  Normocephalic, atraumatic. Pupils equal, round, reactive to light. No scleral icterus. No conjunctival injection. Normal lips, teeth, and gums. No nasal discharge. Neck:  Supple  Heart:  RRR, no murmurs, gallops, rubs  Lungs:  CTA bilaterally, bilat symmetrical expansion, no wheeze, rales, or rhonchi  Abdomen:   Bowel sounds present, soft, nontender, no masses, no organomegaly, no peritoneal signs  Extremities:  No clubbing, cyanosis, or edema  Skin:  Warm and dry, no open lesions or rash  Breast: deferred  Rectal: deferred  Genitalia:  deferred    NEUROLOGICAL EXAM  ---------------------------------    Mental Status Exam:              Occasionally follows commands,  Moves eyes occasionally to commands  Moves extremities to painful stimulii  Equivocal toes  resp on the vent           CBC with Differential:    Lab Results   Component Value Date    WBC 8.1 03/18/2020    RBC 3.13 03/18/2020    HGB 9.2 03/18/2020    HCT 30.7 03/18/2020     03/18/2020    MCV 98.1 03/18/2020    MCH 29.4 03/18/2020    MCHC 30.0 03/18/2020    RDW 15.4 03/18/2020    SEGSPCT 68.0 03/15/2020    BANDSPCT 12 03/15/2020    LYMPHOPCT 11.0 03/15/2020    MONOPCT 9.0 03/15/2020    BASOPCT 1.1 03/14/2020    MONOSABS 0.7 03/15/2020    LYMPHSABS 0.8 03/15/2020    EOSABS 0.2 03/14/2020    BASOSABS 0.1 03/14/2020    DIFFTYPE MANUAL DIFFERENTIAL 03/15/2020     CMP:    Lab Results   Component Value Date     03/18/2020    K 4.0 03/18/2020     03/18/2020    CO2 29 03/18/2020    BUN 21 03/18/2020    CREATININE 1.1 03/18/2020    GFRAA >60 03/18/2020    AGRATIO 1.7 09/12/2019    LABGLOM >60 03/18/2020    GLUCOSE 106 03/18/2020    PROT 4.8 03/18/2020    LABALBU 2.7 03/18/2020    CALCIUM 8.1 03/18/2020    BILITOT 0.3 03/18/2020    ALKPHOS 94 03/18/2020    AST 42 03/18/2020    ALT 31 03/18/2020     BMP:    Lab Results   Component Value Date     03/18/2020    K 4.0 03/18/2020     03/18/2020    CO2 29 03/18/2020    BUN 21 03/18/2020    LABALBU 2.7 03/18/2020    CREATININE 1.1 03/18/2020    CALCIUM 8.1 03/18/2020    GFRAA >60 03/18/2020    LABGLOM >60 03/18/2020    GLUCOSE 106 03/18/2020     PT/INR:    Lab Results   Component Value Date    PROTIME 15.3 03/08/2020    INR 1.26 03/08/2020     PTT:  No results found for: APTT, PTT[APTT  U/A:    Lab Results   Component Value Date    COLORU YELLOW 03/08/2020    WBCUA 10 03/08/2020    RBCUA 24 03/08/2020    MUCUS RARE 03/08/2020    TRICHOMONAS NONE SEEN 03/08/2020    BACTERIA OCCASIONAL 03/08/2020    CLARITYU HAZY 03/08/2020    SPECGRAV 1.032 03/08/2020    SPECGRAV  03/08/2020     (NOTE)  CONSIDER URINE OSMOLARITY TEST IF CLINICALLY INDICATED        LEUKOCYTESUR NEGATIVE 03/08/2020    UROBILINOGEN NORMAL 03/08/2020    BILIRUBINUR NEGATIVE 03/08/2020    BLOODU MODERATE 03/08/2020     TSH:    Lab Results   Component Value Date    TSH 1.80 09/12/2019     VITAMIN B12: No components found for: B12  FOLATE:    Lab Results   Component Value Date    FOLATE 14.6 03/17/2020     RPR:  No results found for: RPR  LIANNE:  No results found for: ANATITER, LIANNE  Urine Toxicology:  No components found for: IAMMENTA, IBARBIT, IBENZO, ICOCAINE, IMARTHC, IOPIATES, IPHENCYC     IMPRESSION:    Metabolic encephalopathy    Hyperammonemia    Hyperglycemia    Acute respiratory failure    ETOH abuse    PLAN:    CT Mri brain neg    B 12 folate nl    pend TSh    Thiamine    Continue supportive care    Discussed with pts nurse. Nurse states Pulmonary is having difficulty extubating patient due to agitation. Anne Urban MD  BOARD CERTIFIED-NEUROLOGY.

## 2020-03-19 NOTE — PROGRESS NOTES
Occupational Therapy    OT/PT attempted to see pt for initial evaluation this afternoon. Pt currently restrained and screaming \"get me scissors. \" Will re-attempt when pt's mental status and ability to participate improves.      Devon Cox, OTR/L, [address] [facility], [id number]

## 2020-03-19 NOTE — PROGRESS NOTES
Infectious Disease Progress Note  3/19/2020   Patient Name: Anahi Hebert : 1965   Impression  · Severe Sepsis, Streptococcus Pneumoniae:  ? Afebrile >48 hours  ? Leukocytosis spiked up to 21.3 today, may be stress-induced  , repeated blood cultures  ? Had code blue event of VF approx 31 seconds, unresponsive and turned pale, VF broke to heart block with rate in 40s  ? Sputum culture 3/8-Strep pneumoniae, Micrococcus Species, Gamma Strep  ? CSF culture 3/9-NGTD, Spinal Fluid analysis RBC 28, WBC 2, Lymphs 100, Segs 0  ? Blood Culture 3/8-(2 sets) 0/-NGTD, 3/19-pending  ? HSV PCR 3/10-Negative  ? RDP 3/8-Negative  ? Procalictonin -3.66, -6.93, 3/13-0.74, 3/15-0.353, 3/17-0.204, 3/19-0.207  ? CRP 3/.1, 3/.7, 3/17-15.4  ? Imaging: 3/13/20-CXR reviewed, layering bilateral pleural effusions . ? CT A&P 3/12-No perforation or drainable fluid collection, uncomplicated distal descending colonic diverticulitis, trace likely free fluid in the pelvis. New bibasilar dependent consolidations compatible with atelectasis vs pneumonia vs aspiration. Small bilateral pleural effusions. Possibility of cirrhosis. · Subconjunctival Hemorrhage: Sclera and conjunctiva reddened and edematous, consultation for ophthalmology, Dr. Marion Luther 3/16, impression of likely due to being on vent, no signs of orbital hemorrhage, should self resolve    · Acute Hypoxic Respiratory Failure:  Pulmonology onboard, Dr. Johnella Gowers,  Intubated on  mechanical ventilation 3/8, weaned off vent 3/18  ? AR:  CTS onboard, Dr. Roger Radford, will need LHC pending resolution of JUSTIN and resp failure to determine best timing for AVR  ? Diverticulitis:   ? JUSTIN from ATN: Nephrology onboard, Dr. Ayde Hightower, improving  ? Anion Gap Metabolic Acidosis:  resolved  ? Toxic/Metabolic Encephalopathy:  Neurology onboard, Dr. Brody Castellano, abnormal EEG, continuing to r/o meningitis/encephalitis. MRI brain no acute abnormality. HSV negative.   Remains agitated XR Chest Portable:  Impression   1. Endotracheal tube in satisfactory position above the esa   2. Otherwise, stable chest demonstrating bibasilar hypoaeration     3/13/20 XR Chest Portable:  Impression   Support tubes and lines as above.  Layering bilateral pleural effusions   adjacent atelectasis. 3/16/20 XR Chest Portable:  Impression   Support lines in good position       Stable congestive changes.        Labs:    Recent Results (from the past 24 hour(s))   POCT Glucose    Collection Time: 03/18/20  2:58 PM   Result Value Ref Range    POC Glucose 86 70 - 99 MG/DL   POCT Glucose    Collection Time: 03/18/20  8:34 PM   Result Value Ref Range    POC Glucose 108 (H) 70 - 99 MG/DL   Basic Metabolic Panel    Collection Time: 03/19/20  5:00 AM   Result Value Ref Range    Sodium 144 135 - 145 MMOL/L    Potassium 4.7 3.5 - 5.1 MMOL/L    Chloride 100 99 - 110 mMol/L    CO2 24 21 - 32 MMOL/L    Anion Gap 20 (H) 4 - 16    BUN 23 6 - 23 MG/DL    CREATININE 1.3 0.9 - 1.3 MG/DL    Glucose 140 (H) 70 - 99 MG/DL    Calcium 9.8 8.3 - 10.6 MG/DL    GFR Non- 57 (L) >60 mL/min/1.73m2    GFR African American >60 >60 mL/min/1.73m2   CBC    Collection Time: 03/19/20  5:00 AM   Result Value Ref Range    WBC 21.3 (H) 4.0 - 10.5 K/CU MM    RBC 3.84 (L) 4.6 - 6.2 M/CU MM    Hemoglobin 11.1 (L) 13.5 - 18.0 GM/DL    Hematocrit 36.3 (L) 42 - 52 %    MCV 94.5 78 - 100 FL    MCH 28.9 27 - 31 PG    MCHC 30.6 (L) 32.0 - 36.0 %    RDW 15.4 (H) 11.7 - 14.9 %    Platelets 161 (H) 576 - 440 K/CU MM    MPV 10.0 7.5 - 11.1 FL   Hepatic Function Panel    Collection Time: 03/19/20  5:00 AM   Result Value Ref Range    Alb 3.8 3.4 - 5.0 GM/DL    Total Bilirubin 0.8 0.0 - 1.0 MG/DL    Bilirubin, Direct 0.3 0.0 - 0.3 MG/DL    Bilirubin, Indirect 0.5 0 - 0.7 MG/DL    Alkaline Phosphatase 118 40 - 129 IU/L    AST 76 (H) 15 - 37 IU/L    ALT 47 (H) 10 - 40 U/L    Total Protein 6.5 6.4 - 8.2 GM/DL   Procalcitonin    Collection Time: 03/19/20 5:00 AM   Result Value Ref Range    Procalcitonin 0.207    Magnesium    Collection Time: 03/19/20  5:00 AM   Result Value Ref Range    Magnesium 1.9 1.8 - 2.4 mg/dl   Troponin    Collection Time: 03/19/20  5:00 AM   Result Value Ref Range    Troponin T 0.086 (H) <0.01 NG/ML   EKG 12 Lead    Collection Time: 03/19/20  6:28 AM   Result Value Ref Range    Ventricular Rate 44 BPM    Atrial Rate 101 BPM    QRS Duration 140 ms    Q-T Interval 546 ms    QTc Calculation (Bazett) 466 ms    P Axis 74 degrees    R Axis -56 degrees    T Axis 57 degrees    Diagnosis       Sinus tachycardia with complete heart block and Wide QRS rhythm  Right bundle branch block  Left anterior fascicular block  Abnormal ECG  When compared with ECG of 09-MAR-2020 12:41,  Patient is in 3rd degree AV block now    Confirmed by [facility] Kallie DAVENPORT ([id number]) on 3/19/2020 10:35:15 AM     Blood gas, arterial    Collection Time: 03/19/20  6:30 AM   Result Value Ref Range    pH, Bld 7.51 (H) 7.34 - 7.45    pCO2, Arterial 35.0 32 - 45 MMHG    pO2, Arterial 49 (L) 75 - 100 MMHG    Base Exc, Mixed 4.9  PLUS   (H) 0 - 1.2    HCO3, Arterial 27.9 (H) 18 - 23 MMOL/L    CO2 Content 29.0 (H) 19 - 24 MMOL/L    O2 Sat 86.6 (L) 96 - 97 %    Carbon Monoxide, Blood 1.7 0 - 5 %    Methemoglobin, Arterial 0.9 <1.5 %    Comment 3L NATALYA    POCT Glucose    Collection Time: 03/19/20 10:33 AM   Result Value Ref Range    POC Glucose 125 (H) 70 - 99 MG/DL     CULTURE results: Invalid input(s): BLOOD CULTURE,  URINE CULTURE, SURGICAL CULTURE    Diagnosis:  Patient Active Problem List   Diagnosis    Anxiety    Sepsis ([address]    Metabolic encephalopathy    Delirium    Hyperammonemia (HCC)    Hyperglycemia    Diverticulitis of colon    Acute respiratory failure with hypoxia (HCC)       Active Problems  Active Problems:    Sepsis (HCC)    Metabolic encephalopathy    Delirium    Hyperammonemia (HCC)    Hyperglycemia    Diverticulitis of colon    Acute respiratory failure with hypoxia ([facility] [address]  Resolved Problems:    * No resolved hospital problems. *    Electronically signed by: Electronically signed by Parminder Cordero.  ROSALIA Floyd CNP on 3/19/2020 at 8:46 AM

## 2020-03-19 NOTE — PROGRESS NOTES
Cardiology Progress Note       Alisson Paz is a 62 y.o. male   1965     SUBJECTIVE:   Patient had episode of torsade V. fib  did not require cardioversion patient currently back to sinus rhythm magnesium was 1.9 currently patient is seen complete heart block  OBJECTIVE:    Review of Systems:  General appearance: alert, appears stated age and cooperative  Skin: Skin color, texture, normal. No rashes or lesions  HEENT: No nose bleed, headache, vision problems  CV: C/O chest pain, tightness, pressure,   Respiratory: C/o no SOB, JOHNS, Orthopnea, PND  GI: No abdominal pain, black stool, bloating  Limbs: No c/o edema, pain, swelling, intermittent claudication, joint pains  Neuro: No dizziness, lightheadedness, syncope, gait problems, memory problems  Psych: grossly normal. No SI/depression. Vitals:   Blood pressure (!) 164/60, pulse (!) 46, temperature 98 °F (36.7 °C), temperature source Axillary, resp. rate 16, height (S) 5' 10\" (1.778 m), weight 213 lb 3 oz (96.7 kg), SpO2 97 %.     HEENT: AT, NC, PERRLA  Neck: No JVD  Heart: S1 S2 audible, no murmur   Lungs:   bilat  Rhonchi         Abdomen: Nontender   Limbs: No edema   CNS: no focal deficit      Past Medical History:   Diagnosis Date    Anxiety         Patient Active Problem List   Diagnosis    Anxiety    Sepsis ([facility] [address]    Metabolic encephalopathy    Delirium    Hyperammonemia (HCC)    Hyperglycemia    Diverticulitis of colon    Acute respiratory failure with hypoxia (HCC)        No Known Allergies     Current Inpatient Medications:    Current Facility-Administered Medications   Medication Dose Route Frequency Provider Last Rate Last Dose    LORazepam (ATIVAN) injection 1 mg  1 mg Intravenous Q4H PRN Jaime Barajas MD        ipratropium-albuterol (DUONEB) nebulizer solution 1 ampule  1 ampule Inhalation Q4H ELEN Bauman MD   1 ampule at 03/19/20 0838    thiamine (B-1) 100 mg in sodium chloride 0.9 % 50 mL IVPB  100 mg Intravenous Daily Ephrigolden Lan MD   Stopped at 03/18/20 1050    ipratropium-albuterol (DUONEB) nebulizer solution 1 ampule  1 ampule Inhalation Q4H PRN Conrado Martini MD   1 ampule at 03/18/20 1823    magnesium oxide (MAG-OX) tablet 400 mg  400 mg Oral BID Graham Eduardo MD   400 mg at 03/19/20 1029    magnesium sulfate 1 g in dextrose 5% 100 mL IVPB  1 g Intravenous PRN Lupe Edward MD        ziprasidone (GEODON) injection 10 mg  10 mg Intramuscular Q6H PRN Lupe Edward MD   10 mg at 03/18/20 2110    enoxaparin (LOVENOX) injection 30 mg  30 mg Subcutaneous Daily ROSALIA Nix - CNP   30 mg at 03/19/20 1029    carboxymethylcellulose (THERATEARS) 1 % ophthalmic gel 1 drop  1 drop Both Eyes Q2H PRN Lupe Edward MD   1 drop at 03/15/20 2237    sodium chloride flush 0.9 % injection 10 mL  10 mL Intravenous 2 times per day Graham Eduardo MD   10 mL at 03/19/20 1029    sodium chloride flush 0.9 % injection 10 mL  10 mL Intravenous PRN Graham Eduardo MD   10 mL at 03/13/20 1812    acetaminophen (TYLENOL) tablet 650 mg  650 mg Oral Q4H PRN Graham Eduardo MD        sodium chloride flush 0.9 % injection 10 mL  10 mL Intravenous BID Graham Eduardo MD   10 mL at 03/19/20 1031    glucose (GLUTOSE) 40 % oral gel 15 g  15 g Oral PRN Graham Eduardo MD        dextrose 50 % IV solution  12.5 g Intravenous PRN Graham Eduardo MD   12.5 g at 03/17/20 2012    glucagon (rDNA) injection 1 mg  1 mg Intramuscular PRN Graham Eduardo MD        dextrose 5 % solution  100 mL/hr Intravenous PRN Graham Eduardo  mL/hr at 03/17/20 2014 100 mL/hr at 03/17/20 2014    sodium chloride flush 0.9 % injection 10 mL  10 mL Intravenous 2 times per day Graham Eduardo MD   10 mL at 03/19/20 1030    sodium chloride flush 0.9 % injection 10 mL  10 mL Intravenous PRN Graham Eduardo MD        acetaminophen (TYLENOL) suppository 650 mg  650 mg Rectal Q6H PRN Graham Eduardo MD   650 mg at 03/09/20 1951    pantoprazole (PROTONIX) injection 40 mg  40 mg Intravenous Daily Ariel Hernandez MD   40 mg at 03/19/20 1029    insulin lispro (HUMALOG) injection vial 0-12 Units  0-12 Units Subcutaneous Q6H Ariel Hernandez MD   Stopped at 03/08/20 0858    aspirin chewable tablet 81 mg  81 mg Oral Daily Ariel Hernandez MD   81 mg at 03/19/20 1029           Labs:  CBC with Differential:    Lab Results   Component Value Date    WBC 21.3 03/19/2020    RBC 3.84 03/19/2020    HGB 11.1 03/19/2020    HCT 36.3 03/19/2020     03/19/2020    MCV 94.5 03/19/2020    MCH 28.9 03/19/2020    MCHC 30.6 03/19/2020    RDW 15.4 03/19/2020    SEGSPCT 68.0 03/15/2020    BANDSPCT 12 03/15/2020    LYMPHOPCT 11.0 03/15/2020    MONOPCT 9.0 03/15/2020    BASOPCT 1.1 03/14/2020    MONOSABS 0.7 03/15/2020    LYMPHSABS 0.8 03/15/2020    EOSABS 0.2 03/14/2020    BASOSABS 0.1 03/14/2020    DIFFTYPE MANUAL DIFFERENTIAL 03/15/2020     CMP:    Lab Results   Component Value Date     03/19/2020    K 4.7 03/19/2020     03/19/2020    CO2 24 03/19/2020    BUN 23 03/19/2020    CREATININE 1.3 03/19/2020    GFRAA >60 03/19/2020    AGRATIO 1.7 09/12/2019    LABGLOM 57 03/19/2020    GLUCOSE 140 03/19/2020    PROT 6.5 03/19/2020    LABALBU 3.8 03/19/2020    CALCIUM 9.8 03/19/2020    BILITOT 0.8 03/19/2020    ALKPHOS 118 03/19/2020    AST 76 03/19/2020    ALT 47 03/19/2020     Hepatic Function Panel:    Lab Results   Component Value Date    ALKPHOS 118 03/19/2020    ALT 47 03/19/2020    AST 76 03/19/2020    PROT 6.5 03/19/2020    BILITOT 0.8 03/19/2020    BILIDIR 0.3 03/19/2020    IBILI 0.5 03/19/2020    LABALBU 3.8 03/19/2020     Magnesium:    Lab Results   Component Value Date    MG 1.9 03/19/2020     PT/INR:    Lab Results   Component Value Date    PROTIME 15.3 03/08/2020    INR 1.26 03/08/2020     Last 3 Troponin:  No results found for: TROPONINI  U/A:    Lab Results   Component Value Date    COLORU YELLOW 03/08/2020    WBCUA 10 03/08/2020    RBCUA 24 03/08/2020    MUCUS RARE 03/08/2020    TRICHOMONAS NONE SEEN 03/08/2020    BACTERIA OCCASIONAL 03/08/2020    CLARITYU HAZY 03/08/2020    SPECGRAV 1.032 03/08/2020    SPECGRAV  03/08/2020     (NOTE)  CONSIDER URINE OSMOLARITY TEST IF CLINICALLY INDICATED        LEUKOCYTESUR NEGATIVE 03/08/2020    UROBILINOGEN NORMAL 03/08/2020    BILIRUBINUR NEGATIVE 03/08/2020    BLOODU MODERATE 03/08/2020     ABG:    Lab Results   Component Value Date    PMR2VLY 35.0 03/19/2020    PO2ART 49 03/19/2020    JEZ4LUZ 27.9 03/19/2020     FLP:    Lab Results   Component Value Date    HDL 66 09/12/2019    LDLCALC 82 09/12/2019    LABVLDL 58 09/12/2019     TSH:    Lab Results   Component Value Date    TSH 1.80 09/12/2019      DATA:   ECG: Sinus Rhythm       ASSESSMENT:   1 severe sepsis GINGER no endocarditis  Severe    Aortic  Regurgitation    #Episode of V. fib or polymorphic V. tach not requiring cardioversion  Appeared to be torsades magnesium 1.9    2 acute respiratory failure currently extubated   Patient agitated and confused     3 non-STEMI Status Post  Cath   mild CAD no intervention    #Acute metabolic encephalopathy    5 severe eccentric regurgitation will need aortic valve replacement evaluations once stable recovers    6 alcoholism with currently metabolic encephalopathy  PLAN   Patient will need a pacemaker  Electrophysiology consult for evaluation

## 2020-03-19 NOTE — PROGRESS NOTES
Extubated yesterday  Bradycardia induced torsades -with a 31 sec run  Remains in 2:1 block  Discussed with ID plan for a pacer today  discussed with Dr.Somuya Junior  Dual chamber pacer  Will decide on AI later when he is more stable

## 2020-03-19 NOTE — PROGRESS NOTES
Hospitalist Progress Note      Name:  Jack Wallace /Age/Sex: 1965  (62 y.o. male)   MRN & CSN:  [id number] & [id number] Admission Date/Time: 3/8/2020  3:44 AM   Location:  -A PCP: No primary care provider on file. Hospital Day: 12    Assessment and Plan:   Jack Wallace is a 62 y.o.  male  who presents with <principal problem not specified>    > Severe sepsis  > Acute diverticulitis,   > Strep Pneumoniae Pneumonia  - HSV: Negative. Stopped acyclovir. CT abdomen pelvis: Diverticulitis; new bibasilar consolidations; possible cirrhosis. - rule out aseptic meningitis , CSF showed 6 WBCs and 100% lymphocytes. CSF culture: No growth to date  - Pulm, ID consulted  -Respiratory culture: Strep pneumo resistant to penicillin. Stopped Unasyn. On Rocephin and add Flagyl.   -Continue vancomycin. Blood culture: No growth. - Vanc, flagyl and rocephin discontinued  - elevated leukocytosis most likely sec to overnight arrhythmia, doubt infection, repeat blood cx ordered, ID on board. >NSTEMI with arrythmia/2:1 heart block  > V-fib  -Cardio following  -Cardiac cath: Mild CAD. Echo: 50-55%; severe eccentric aortic regurgitation. Aspirin.  -Off dopamine drip.    - had Vfib  for 31 sec overnight, converted back to yennifer and pt gained back conscious on his own before CPR start. Pt later on in the day after noon 3/19 had another incident where he had few chest compression and converted back to yennifer and gained conscious, cardiology notified, will take to cath lab and place temporary pacemaker. And start amiodarone. >Acute metabolic encephalopathy, Likely multifactorial from hyperammonemia, amphetamine use, acute kidney injury, sepsis. - Ammonia level:470, but quickly normalized. CT head non-acute. - Neuro consulted. CSF: slightly elevated WBCs. - PRN Ativan and Geodon. Agitation may be due to possible alcohol withdrawal.  EEG: Severe diffuse slowing of the background.   MRI brain

## 2020-03-19 NOTE — CARE COORDINATION
Cm received return call from pt's wife. Wife is distressed about being unable to see her . Cm offered support. Pt updated pt's wife that pt remains confused but is off the ventilator. CM advised wife of orders for PT/OT evals which will help determine next step for pt as he recovers. Cm encouraged pt's wife to call any time and CM will work with she and pt on planning for pt needs at discharge.

## 2020-03-20 ENCOUNTER — ANESTHESIA EVENT (OUTPATIENT)
Dept: OPERATING ROOM | Age: 55
DRG: 853 | End: 2020-03-20
Payer: COMMERCIAL

## 2020-03-20 ENCOUNTER — APPOINTMENT (OUTPATIENT)
Dept: GENERAL RADIOLOGY | Age: 55
DRG: 853 | End: 2020-03-20
Payer: COMMERCIAL

## 2020-03-20 ENCOUNTER — ANESTHESIA (OUTPATIENT)
Dept: OPERATING ROOM | Age: 55
DRG: 853 | End: 2020-03-20
Payer: COMMERCIAL

## 2020-03-20 VITALS — OXYGEN SATURATION: 95 % | DIASTOLIC BLOOD PRESSURE: 61 MMHG | TEMPERATURE: 96.8 F | SYSTOLIC BLOOD PRESSURE: 117 MMHG

## 2020-03-20 LAB
ALBUMIN SERPL-MCNC: 3.5 GM/DL (ref 3.4–5)
ALP BLD-CCNC: 100 IU/L (ref 40–129)
ALT SERPL-CCNC: 39 U/L (ref 10–40)
ANION GAP SERPL CALCULATED.3IONS-SCNC: 16 MMOL/L (ref 4–16)
AST SERPL-CCNC: 49 IU/L (ref 15–37)
BILIRUB SERPL-MCNC: 0.8 MG/DL (ref 0–1)
BILIRUBIN DIRECT: 0.3 MG/DL (ref 0–0.3)
BILIRUBIN, INDIRECT: 0.5 MG/DL (ref 0–0.7)
BUN BLDV-MCNC: 22 MG/DL (ref 6–23)
CALCIUM SERPL-MCNC: 9.1 MG/DL (ref 8.3–10.6)
CHLORIDE BLD-SCNC: 100 MMOL/L (ref 99–110)
CO2: 27 MMOL/L (ref 21–32)
CREAT SERPL-MCNC: 1.2 MG/DL (ref 0.9–1.3)
DOSE AMOUNT: ABNORMAL
DOSE TIME: ABNORMAL
EKG ATRIAL RATE: 94 BPM
EKG DIAGNOSIS: NORMAL
EKG P AXIS: 77 DEGREES
EKG P-R INTERVAL: 154 MS
EKG Q-T INTERVAL: 504 MS
EKG QRS DURATION: 134 MS
EKG QTC CALCULATION (BAZETT): 490 MS
EKG R AXIS: -35 DEGREES
EKG T AXIS: 29 DEGREES
EKG VENTRICULAR RATE: 57 BPM
GFR AFRICAN AMERICAN: >60 ML/MIN/1.73M2
GFR NON-AFRICAN AMERICAN: >60 ML/MIN/1.73M2
GLUCOSE BLD-MCNC: 103 MG/DL (ref 70–99)
GLUCOSE BLD-MCNC: 104 MG/DL (ref 70–99)
GLUCOSE BLD-MCNC: 109 MG/DL (ref 70–99)
GLUCOSE BLD-MCNC: 175 MG/DL (ref 70–99)
HCT VFR BLD CALC: 33.5 % (ref 42–52)
HEMOGLOBIN: 10.4 GM/DL (ref 13.5–18)
MCH RBC QN AUTO: 29.5 PG (ref 27–31)
MCHC RBC AUTO-ENTMCNC: 31 % (ref 32–36)
MCV RBC AUTO: 94.9 FL (ref 78–100)
PDW BLD-RTO: 15.7 % (ref 11.7–14.9)
PLATELET # BLD: 363 K/CU MM (ref 140–440)
PMV BLD AUTO: 10.1 FL (ref 7.5–11.1)
POTASSIUM SERPL-SCNC: 3.7 MMOL/L (ref 3.5–5.1)
RBC # BLD: 3.53 M/CU MM (ref 4.6–6.2)
SODIUM BLD-SCNC: 143 MMOL/L (ref 135–145)
TOTAL PROTEIN: 5.9 GM/DL (ref 6.4–8.2)
VANCOMYCIN TROUGH: <4 UG/ML (ref 10–20)
WBC # BLD: 12.3 K/CU MM (ref 4–10.5)

## 2020-03-20 PROCEDURE — 2580000003 HC RX 258: Performed by: INTERNAL MEDICINE

## 2020-03-20 PROCEDURE — 97116 GAIT TRAINING THERAPY: CPT

## 2020-03-20 PROCEDURE — 82962 GLUCOSE BLOOD TEST: CPT

## 2020-03-20 PROCEDURE — 6360000002 HC RX W HCPCS: Performed by: PSYCHIATRY & NEUROLOGY

## 2020-03-20 PROCEDURE — 02HK3JZ INSERTION OF PACEMAKER LEAD INTO RIGHT VENTRICLE, PERCUTANEOUS APPROACH: ICD-10-PCS | Performed by: SURGERY

## 2020-03-20 PROCEDURE — 2700000000 HC OXYGEN THERAPY PER DAY

## 2020-03-20 PROCEDURE — 2000000000 HC ICU R&B

## 2020-03-20 PROCEDURE — 93005 ELECTROCARDIOGRAM TRACING: CPT | Performed by: INTERNAL MEDICINE

## 2020-03-20 PROCEDURE — 2580000003 HC RX 258: Performed by: SURGERY

## 2020-03-20 PROCEDURE — 3700000001 HC ADD 15 MINUTES (ANESTHESIA): Performed by: SURGERY

## 2020-03-20 PROCEDURE — 6370000000 HC RX 637 (ALT 250 FOR IP): Performed by: INTERNAL MEDICINE

## 2020-03-20 PROCEDURE — 2709999900 HC NON-CHARGEABLE SUPPLY: Performed by: SURGERY

## 2020-03-20 PROCEDURE — 80202 ASSAY OF VANCOMYCIN: CPT

## 2020-03-20 PROCEDURE — 6360000002 HC RX W HCPCS: Performed by: INTERNAL MEDICINE

## 2020-03-20 PROCEDURE — 97530 THERAPEUTIC ACTIVITIES: CPT

## 2020-03-20 PROCEDURE — 2720000010 HC SURG SUPPLY STERILE: Performed by: SURGERY

## 2020-03-20 PROCEDURE — 76000 FLUOROSCOPY <1 HR PHYS/QHP: CPT

## 2020-03-20 PROCEDURE — 97163 PT EVAL HIGH COMPLEX 45 MIN: CPT

## 2020-03-20 PROCEDURE — C1785 PMKR, DUAL, RATE-RESP: HCPCS | Performed by: SURGERY

## 2020-03-20 PROCEDURE — 97535 SELF CARE MNGMENT TRAINING: CPT

## 2020-03-20 PROCEDURE — 92526 ORAL FUNCTION THERAPY: CPT

## 2020-03-20 PROCEDURE — 94761 N-INVAS EAR/PLS OXIMETRY MLT: CPT

## 2020-03-20 PROCEDURE — C9113 INJ PANTOPRAZOLE SODIUM, VIA: HCPCS | Performed by: INTERNAL MEDICINE

## 2020-03-20 PROCEDURE — 71045 X-RAY EXAM CHEST 1 VIEW: CPT

## 2020-03-20 PROCEDURE — 3600000013 HC SURGERY LEVEL 3 ADDTL 15MIN: Performed by: SURGERY

## 2020-03-20 PROCEDURE — 0JH606Z INSERTION OF PACEMAKER, DUAL CHAMBER INTO CHEST SUBCUTANEOUS TISSUE AND FASCIA, OPEN APPROACH: ICD-10-PCS | Performed by: SURGERY

## 2020-03-20 PROCEDURE — 02H63JZ INSERTION OF PACEMAKER LEAD INTO RIGHT ATRIUM, PERCUTANEOUS APPROACH: ICD-10-PCS | Performed by: SURGERY

## 2020-03-20 PROCEDURE — 2500000003 HC RX 250 WO HCPCS: Performed by: SURGERY

## 2020-03-20 PROCEDURE — 93010 ELECTROCARDIOGRAM REPORT: CPT | Performed by: INTERNAL MEDICINE

## 2020-03-20 PROCEDURE — 85027 COMPLETE CBC AUTOMATED: CPT

## 2020-03-20 PROCEDURE — C1898 LEAD, PMKR, OTHER THAN TRANS: HCPCS | Performed by: SURGERY

## 2020-03-20 PROCEDURE — 3600000003 HC SURGERY LEVEL 3 BASE: Performed by: SURGERY

## 2020-03-20 PROCEDURE — 6360000002 HC RX W HCPCS: Performed by: NURSE ANESTHETIST, CERTIFIED REGISTERED

## 2020-03-20 PROCEDURE — 82248 BILIRUBIN DIRECT: CPT

## 2020-03-20 PROCEDURE — 80053 COMPREHEN METABOLIC PANEL: CPT

## 2020-03-20 PROCEDURE — 2580000003 HC RX 258: Performed by: PSYCHIATRY & NEUROLOGY

## 2020-03-20 PROCEDURE — 97166 OT EVAL MOD COMPLEX 45 MIN: CPT

## 2020-03-20 PROCEDURE — 99233 SBSQ HOSP IP/OBS HIGH 50: CPT | Performed by: NURSE PRACTITIONER

## 2020-03-20 PROCEDURE — 3700000000 HC ANESTHESIA ATTENDED CARE: Performed by: SURGERY

## 2020-03-20 PROCEDURE — 6360000002 HC RX W HCPCS: Performed by: SURGERY

## 2020-03-20 PROCEDURE — 2500000003 HC RX 250 WO HCPCS: Performed by: INTERNAL MEDICINE

## 2020-03-20 PROCEDURE — 94640 AIRWAY INHALATION TREATMENT: CPT

## 2020-03-20 DEVICE — PACEMAKER CARD 22.5GM W50.8XH46.6MM D7.4MM TI POLYUR SIL: Type: IMPLANTABLE DEVICE | Site: CHEST | Status: FUNCTIONAL

## 2020-03-20 DEVICE — LEAD PACE AD 6FR L58CM VENT SIL PLAT INSUL BPLR PLATINIZED 507658] MEDTRONIC CARDIAC RTHYM MGT]: Type: IMPLANTABLE DEVICE | Site: CHEST | Status: FUNCTIONAL

## 2020-03-20 DEVICE — LEAD PACE 6FR L52CM SIL INSUL BPLR PLATINIZED STEROID DXAC: Type: IMPLANTABLE DEVICE | Site: CHEST | Status: FUNCTIONAL

## 2020-03-20 RX ORDER — MIDAZOLAM HYDROCHLORIDE 1 MG/ML
INJECTION INTRAMUSCULAR; INTRAVENOUS PRN
Status: DISCONTINUED | OUTPATIENT
Start: 2020-03-20 | End: 2020-03-20 | Stop reason: SDUPTHER

## 2020-03-20 RX ORDER — SODIUM CHLORIDE 0.9 % (FLUSH) 0.9 %
10 SYRINGE (ML) INJECTION EVERY 12 HOURS SCHEDULED
Status: DISCONTINUED | OUTPATIENT
Start: 2020-03-20 | End: 2020-03-24 | Stop reason: HOSPADM

## 2020-03-20 RX ORDER — PROPOFOL 10 MG/ML
INJECTION, EMULSION INTRAVENOUS PRN
Status: DISCONTINUED | OUTPATIENT
Start: 2020-03-20 | End: 2020-03-20 | Stop reason: SDUPTHER

## 2020-03-20 RX ORDER — ACETAMINOPHEN 325 MG/1
650 TABLET ORAL EVERY 4 HOURS PRN
Status: DISCONTINUED | OUTPATIENT
Start: 2020-03-20 | End: 2020-03-24 | Stop reason: HOSPADM

## 2020-03-20 RX ORDER — SODIUM CHLORIDE 0.9 % (FLUSH) 0.9 %
10 SYRINGE (ML) INJECTION PRN
Status: DISCONTINUED | OUTPATIENT
Start: 2020-03-20 | End: 2020-03-24 | Stop reason: HOSPADM

## 2020-03-20 RX ORDER — LISINOPRIL 5 MG/1
5 TABLET ORAL DAILY
Status: DISCONTINUED | OUTPATIENT
Start: 2020-03-20 | End: 2020-03-21

## 2020-03-20 RX ORDER — MORPHINE SULFATE 2 MG/ML
1 INJECTION, SOLUTION INTRAMUSCULAR; INTRAVENOUS
Status: ACTIVE | OUTPATIENT
Start: 2020-03-20 | End: 2020-03-20

## 2020-03-20 RX ORDER — ATROPINE SULFATE 0.4 MG/ML
0.5 AMPUL (ML) INJECTION
Status: ACTIVE | OUTPATIENT
Start: 2020-03-20 | End: 2020-03-20

## 2020-03-20 RX ORDER — LIDOCAINE HYDROCHLORIDE 20 MG/ML
INJECTION, SOLUTION INTRAVENOUS PRN
Status: DISCONTINUED | OUTPATIENT
Start: 2020-03-20 | End: 2020-03-20 | Stop reason: SDUPTHER

## 2020-03-20 RX ORDER — FENTANYL CITRATE 50 UG/ML
INJECTION, SOLUTION INTRAMUSCULAR; INTRAVENOUS PRN
Status: DISCONTINUED | OUTPATIENT
Start: 2020-03-20 | End: 2020-03-20 | Stop reason: SDUPTHER

## 2020-03-20 RX ORDER — SODIUM CHLORIDE 9 MG/ML
INJECTION, SOLUTION INTRAVENOUS CONTINUOUS
Status: DISCONTINUED | OUTPATIENT
Start: 2020-03-20 | End: 2020-03-20

## 2020-03-20 RX ORDER — FUROSEMIDE 10 MG/ML
40 INJECTION INTRAMUSCULAR; INTRAVENOUS ONCE
Status: COMPLETED | OUTPATIENT
Start: 2020-03-20 | End: 2020-03-20

## 2020-03-20 RX ORDER — LIDOCAINE HYDROCHLORIDE 10 MG/ML
INJECTION, SOLUTION INFILTRATION; PERINEURAL
Status: COMPLETED | OUTPATIENT
Start: 2020-03-20 | End: 2020-03-20

## 2020-03-20 RX ORDER — CEFAZOLIN SODIUM 2 G/50ML
SOLUTION INTRAVENOUS PRN
Status: DISCONTINUED | OUTPATIENT
Start: 2020-03-20 | End: 2020-03-20 | Stop reason: SDUPTHER

## 2020-03-20 RX ORDER — POTASSIUM CHLORIDE 20 MEQ/1
20 TABLET, EXTENDED RELEASE ORAL DAILY
Status: DISCONTINUED | OUTPATIENT
Start: 2020-03-20 | End: 2020-03-24 | Stop reason: HOSPADM

## 2020-03-20 RX ORDER — FUROSEMIDE 20 MG/1
20 TABLET ORAL DAILY
Status: DISCONTINUED | OUTPATIENT
Start: 2020-03-20 | End: 2020-03-24 | Stop reason: HOSPADM

## 2020-03-20 RX ADMIN — LIDOCAINE HYDROCHLORIDE 60 MG: 20 INJECTION, SOLUTION INTRAVENOUS at 12:04

## 2020-03-20 RX ADMIN — INSULIN LISPRO 2 UNITS: 100 INJECTION, SOLUTION INTRAVENOUS; SUBCUTANEOUS at 16:16

## 2020-03-20 RX ADMIN — IPRATROPIUM BROMIDE AND ALBUTEROL SULFATE 1 AMPULE: .5; 3 SOLUTION RESPIRATORY (INHALATION) at 21:20

## 2020-03-20 RX ADMIN — METOPROLOL TARTRATE 50 MG: 25 TABLET, FILM COATED ORAL at 21:39

## 2020-03-20 RX ADMIN — SODIUM CHLORIDE, PRESERVATIVE FREE 10 ML: 5 INJECTION INTRAVENOUS at 08:56

## 2020-03-20 RX ADMIN — PANTOPRAZOLE SODIUM 40 MG: 40 INJECTION, POWDER, FOR SOLUTION INTRAVENOUS at 08:55

## 2020-03-20 RX ADMIN — MIDAZOLAM 2 MG: 1 INJECTION INTRAMUSCULAR; INTRAVENOUS at 11:50

## 2020-03-20 RX ADMIN — SODIUM CHLORIDE, PRESERVATIVE FREE 10 ML: 5 INJECTION INTRAVENOUS at 21:39

## 2020-03-20 RX ADMIN — LISINOPRIL 5 MG: 5 TABLET ORAL at 21:39

## 2020-03-20 RX ADMIN — DEXMEDETOMIDINE 0.3 MCG/KG/HR: 100 INJECTION, SOLUTION, CONCENTRATE INTRAVENOUS at 07:31

## 2020-03-20 RX ADMIN — SODIUM CHLORIDE, PRESERVATIVE FREE 10 ML: 5 INJECTION INTRAVENOUS at 08:57

## 2020-03-20 RX ADMIN — FUROSEMIDE 20 MG: 20 TABLET ORAL at 17:30

## 2020-03-20 RX ADMIN — FUROSEMIDE 40 MG: 10 INJECTION, SOLUTION INTRAMUSCULAR; INTRAVENOUS at 08:55

## 2020-03-20 RX ADMIN — Medication 10 ML: at 08:56

## 2020-03-20 RX ADMIN — THIAMINE HYDROCHLORIDE 100 MG: 100 INJECTION, SOLUTION INTRAMUSCULAR; INTRAVENOUS at 08:55

## 2020-03-20 RX ADMIN — CEFAZOLIN SODIUM 2 G: 2 SOLUTION INTRAVENOUS at 12:15

## 2020-03-20 RX ADMIN — IPRATROPIUM BROMIDE AND ALBUTEROL SULFATE 1 AMPULE: .5; 3 SOLUTION RESPIRATORY (INHALATION) at 16:28

## 2020-03-20 RX ADMIN — POTASSIUM PHOSPHATE, MONOBASIC AND POTASSIUM PHOSPHATE, DIBASIC 15 MMOL: 224; 236 INJECTION, SOLUTION, CONCENTRATE INTRAVENOUS at 09:02

## 2020-03-20 RX ADMIN — FENTANYL CITRATE 50 MCG: 50 INJECTION INTRAMUSCULAR; INTRAVENOUS at 12:12

## 2020-03-20 RX ADMIN — POTASSIUM CHLORIDE 20 MEQ: 1500 TABLET, EXTENDED RELEASE ORAL at 17:30

## 2020-03-20 RX ADMIN — IPRATROPIUM BROMIDE AND ALBUTEROL SULFATE 1 AMPULE: .5; 3 SOLUTION RESPIRATORY (INHALATION) at 08:28

## 2020-03-20 RX ADMIN — FENTANYL CITRATE 50 MCG: 50 INJECTION INTRAMUSCULAR; INTRAVENOUS at 12:14

## 2020-03-20 RX ADMIN — Medication 400 MG: at 21:39

## 2020-03-20 RX ADMIN — PROPOFOL 430 MG: 10 INJECTION, EMULSION INTRAVENOUS at 12:04

## 2020-03-20 ASSESSMENT — PULMONARY FUNCTION TESTS
PIF_VALUE: 2
PIF_VALUE: 1
PIF_VALUE: 0
PIF_VALUE: 1
PIF_VALUE: 0
PIF_VALUE: 1
PIF_VALUE: 0
PIF_VALUE: 1
PIF_VALUE: 1
PIF_VALUE: 0
PIF_VALUE: 1
PIF_VALUE: 1
PIF_VALUE: 0
PIF_VALUE: 2
PIF_VALUE: 1
PIF_VALUE: 0
PIF_VALUE: 0
PIF_VALUE: 1
PIF_VALUE: 1
PIF_VALUE: 2
PIF_VALUE: 0
PIF_VALUE: 2
PIF_VALUE: 0
PIF_VALUE: 1
PIF_VALUE: 0
PIF_VALUE: 2
PIF_VALUE: 0
PIF_VALUE: 1
PIF_VALUE: 1
PIF_VALUE: 0
PIF_VALUE: 1
PIF_VALUE: 0
PIF_VALUE: 1
PIF_VALUE: 2
PIF_VALUE: 1
PIF_VALUE: 0
PIF_VALUE: 2
PIF_VALUE: 1
PIF_VALUE: 0
PIF_VALUE: 1
PIF_VALUE: 0
PIF_VALUE: 1
PIF_VALUE: 0
PIF_VALUE: 6
PIF_VALUE: 1
PIF_VALUE: 4
PIF_VALUE: 2
PIF_VALUE: 0
PIF_VALUE: 1
PIF_VALUE: 0
PIF_VALUE: 1
PIF_VALUE: 0
PIF_VALUE: 1
PIF_VALUE: 1
PIF_VALUE: 0
PIF_VALUE: 1
PIF_VALUE: 0
PIF_VALUE: 1
PIF_VALUE: 3
PIF_VALUE: 1
PIF_VALUE: 0
PIF_VALUE: 0
PIF_VALUE: 2
PIF_VALUE: 2
PIF_VALUE: 1
PIF_VALUE: 2
PIF_VALUE: 1

## 2020-03-20 ASSESSMENT — PAIN DESCRIPTION - LOCATION: LOCATION: CHEST

## 2020-03-20 ASSESSMENT — PAIN SCALES - GENERAL
PAINLEVEL_OUTOF10: 0
PAINLEVEL_OUTOF10: 3
PAINLEVEL_OUTOF10: 0

## 2020-03-20 ASSESSMENT — PAIN DESCRIPTION - DESCRIPTORS: DESCRIPTORS: ACHING

## 2020-03-20 ASSESSMENT — PAIN DESCRIPTION - FREQUENCY: FREQUENCY: CONTINUOUS

## 2020-03-20 ASSESSMENT — PAIN - FUNCTIONAL ASSESSMENT: PAIN_FUNCTIONAL_ASSESSMENT: PREVENTS OR INTERFERES SOME ACTIVE ACTIVITIES AND ADLS

## 2020-03-20 ASSESSMENT — PAIN DESCRIPTION - ORIENTATION: ORIENTATION: LEFT

## 2020-03-20 ASSESSMENT — PAIN DESCRIPTION - PAIN TYPE: TYPE: SURGICAL PAIN

## 2020-03-20 NOTE — PROGRESS NOTES
clubbing, cyanosis, or edema  Skin:  Warm and dry, no open lesions or rash  Breast: deferred  Rectal: deferred  Genitalia:  deferred    NEUROLOGICAL EXAM  ---------------------------------    Mental Status Exam:              Exam before cardiac arrest  Confused but  follows commands,  Moves eyes occasionally to commands  Moves extremities to painful stimulii  Equivocal toes  resp on the vent           CBC with Differential:    Lab Results   Component Value Date    WBC 21.3 03/19/2020    RBC 3.84 03/19/2020    HGB 11.1 03/19/2020    HCT 36.3 03/19/2020     03/19/2020    MCV 94.5 03/19/2020    MCH 28.9 03/19/2020    MCHC 30.6 03/19/2020    RDW 15.4 03/19/2020    SEGSPCT 68.0 03/15/2020    BANDSPCT 12 03/15/2020    LYMPHOPCT 11.0 03/15/2020    MONOPCT 9.0 03/15/2020    BASOPCT 1.1 03/14/2020    MONOSABS 0.7 03/15/2020    LYMPHSABS 0.8 03/15/2020    EOSABS 0.2 03/14/2020    BASOSABS 0.1 03/14/2020    DIFFTYPE MANUAL DIFFERENTIAL 03/15/2020     CMP:    Lab Results   Component Value Date     03/19/2020    K 4.0 03/19/2020    CL 97 03/19/2020    CO2 27 03/19/2020    BUN 21 03/19/2020    CREATININE 1.2 03/19/2020    GFRAA >60 03/19/2020    AGRATIO 1.7 09/12/2019    LABGLOM >60 03/19/2020    GLUCOSE 107 03/19/2020    PROT 6.5 03/19/2020    LABALBU 3.8 03/19/2020    CALCIUM 9.4 03/19/2020    BILITOT 0.8 03/19/2020    ALKPHOS 118 03/19/2020    AST 76 03/19/2020    ALT 47 03/19/2020     BMP:    Lab Results   Component Value Date     03/19/2020    K 4.0 03/19/2020    CL 97 03/19/2020    CO2 27 03/19/2020    BUN 21 03/19/2020    LABALBU 3.8 03/19/2020    CREATININE 1.2 03/19/2020    CALCIUM 9.4 03/19/2020    GFRAA >60 03/19/2020    LABGLOM >60 03/19/2020    GLUCOSE 107 03/19/2020     PT/INR:    Lab Results   Component Value Date    PROTIME 15.3 03/08/2020    INR 1.26 03/08/2020     PTT:  No results found for: APTT, PTT[APTT  U/A:    Lab Results   Component Value Date    COLORU YELLOW 03/08/2020    WBCUA 10

## 2020-03-20 NOTE — PROGRESS NOTES
Dr. Rohini Baker was on the unit making rounds and he requested a CVP on Pt. This RN obtained a CVP on Pt and it was 15. Informed Dr. Rohini Baker of this per secure message per [employer].

## 2020-03-20 NOTE — PROGRESS NOTES
Nutrition Assessment    Type and Reason for Visit: Reassess    Nutrition Recommendations:   · Restart diet as soon as possible  · Will order supplements    Nutrition Assessment: Pt is now extubated and started on a full liquid diet. he refused to eat and is now NPO once more. Will order supplements and continue to follow    Malnutrition Assessment:  · Malnutrition Status: At risk for malnutrition  · Context: Acute illness or injury  · Findings of the 6 clinical characteristics of malnutrition (Minimum of 2 out of 6 clinical characteristics is required to make the diagnosis of moderate or severe Protein Calorie Malnutrition based on AND/ASPEN Guidelines):  1. Energy Intake-Greater than 75% of estimated energy requirement, Greater than or equal to 7 days    2. Weight Loss-No significant weight loss, in 6 months  3. Fat Loss-No significant subcutaneous fat loss, Orbital  4. Muscle Loss-No significant muscle mass loss, Clavicles (pectoralis and deltoids)  5. Fluid Accumulation-Mild fluid accumulation, Generalized  6.   Strength-Not measured    Nutrition Risk Level: High    Nutrient Needs:  · Estimated Daily Total Kcal: 4256-0651 based on MSJ  · Estimated Daily Protein (g): 75-90 based on 1-1.2 g/kg/IBW  · Estimated Daily Total Fluid (ml/day): 2103-6010 based on 1 mL/kcal    Nutrition Diagnosis:   · Problem: Inadequate oral intake  · Etiology: related to Impaired respiratory function-inability to consume food     Signs and symptoms:  as evidenced by NPO status due to medical condition, Intubation    Objective Information:  · Wound Type: None  · Current Nutrition Therapies:  · Oral Diet Orders: NPO   · Anthropometric Measures:  · Ht: (S) 5' 10\" (177.8 cm)   · Current Body Wt: 213 lb (96.6 kg)  · Admission Body Wt: 220 lb (99.8 kg)  · Usual Body Wt: 211 lb (95.7 kg)  · % Weight Change: none noted  · Ideal Body Wt: 166 lb (75.3 kg), % Ideal Body 101%  · BMI Classification: BMI 30.0 - 34.9 Obese Class I    Nutrition

## 2020-03-20 NOTE — PROGRESS NOTES
Physical Therapy    Facility/Department: [facility] ICU  Initial Assessment    NAME: Duane Page  : 1965  MRN: [id number]    Date of Service: 3/20/2020    Discharge Recommendations:  Subacute/Skilled Nursing Facility        Assessment   Assessment: Pt is a 62 y.o. male with medical history, surgical history, co-morbidities, and personal factors including Anxiety and alcohol abuse with admission for  Delirium, Metabolic acidosis, Diverticulitis of colon, Hepatic encephalopathy, Septicemia, Elevated lactic acid level, Hyperglycemia, and Pleural effusion. Prior to admission, pt was independent with functional mobility and ADLs. Examination of body systems reveals decreased strength, decreased balance, decreased aerobic capacity, impaired cognition, and decreased independence with functional mobility.       Prognosis: Good  Decision Making: High Complexity  Clinical Presentation: unpredictable characteristics  PT Education: Goals;Transfer Training;Equipment;PT Role;Functional Mobility Training;Plan of Care;General Safety;Gait Training;Orientation  REQUIRES PT FOLLOW UP: Yes  Activity Tolerance  Activity Tolerance: Patient Tolerated treatment well           Restrictions  Restrictions/Precautions  Restrictions/Precautions: Fall Risk, General Precautions  Implants present? : Pacemaker  Position Activity Restriction  Other position/activity restrictions: pacemaker precautions  Vision/Hearing  Vision: Within Functional Limits  Hearing: Within functional limits     Subjective  General  Chart Reviewed: Yes  Patient assessed for rehabilitation services?: Yes  Family / Caregiver Present: Yes  Follows Commands: Within Functional Limits  Pain Screening  Patient Currently in Pain: Denies  Vital Signs  Patient Currently in Pain: Denies       Orientation  Orientation  Overall Orientation Status: Impaired  Orientation Level: Disoriented to time;Oriented to place;Oriented to person  Social/Functional History  Social/Functional History  Lives With: Spouse  Type of Home: House  Home Layout: Two level, Bed/Bath upstairs(12 steps with a handrail to access second floor)  Home Access: Stairs to enter with rails  Entrance Stairs - Number of Steps: 3  Bathroom Shower/Tub: Walk-in shower  Bathroom Toilet: Standard  ADL Assistance: Independent  Homemaking Assistance: Independent  Ambulation Assistance: Independent  Transfer Assistance: Independent  Active : Yes  Occupation: Full time employment  Type of occupation:   Cognition   Cognition  Overall Cognitive Status: Exceptions  Arousal/Alertness: Appropriate responses to stimuli;Delayed responses to stimuli  Following Commands:  Follows multistep commands with repitition  Attention Span: Appears intact  Memory: Decreased short term memory  Safety Judgement: Decreased awareness of need for safety;Decreased awareness of need for assistance  Problem Solving: Decreased awareness of errors  Insights: Decreased awareness of deficits  Initiation: Requires cues for some  Sequencing: Requires cues for some    Objective             Strength RLE  Comment: knee extension: 4/5, ankle DF: 4+/5  Strength LLE  Comment: knee extension: 4/5, ankle DF: 4+/5     Sensation  Overall Sensation Status: WNL  Bed mobility  Rolling to Right: Contact guard assistance(with HOB elevated; with increased time for task completion)  Supine to Sit: Contact guard assistance  Transfers  Sit to Stand: Contact guard assistance  Stand to sit: Minimal Assistance(with verbal cues to feel bed against back of legs, reach back, and sit slowly)  Ambulation  Ambulation?: Yes  Ambulation 1  Surface: level tile  Device: No Device  Assistance: Minimal assistance;Contact guard assistance  Quality of Gait: decreased gait speed, decreased step length bilaterally, unsteady, reaching for IV pole for assistance with balance   Distance: 60 feet + 60 feet  Comments: with verbal and tactile cues for BLE placement throughout ambulation; with verbal and tactile cues to maintain upright posture in order to avoid COM shifting outside of [place]; with verbal cues to practice pursed lip breathing technique; with verbal cues for directions in order to successfully navigate hallway and return to correct room     Balance  Posture: Fair(forward head, rounded shoulders, increased thoracic kyphosis)  Sitting - Static: Good  Sitting - Dynamic: Good  Standing - Static: Fair;-  Standing - Dynamic: Poor;+      Gait Training:  Cues were given for safety, sequence, device management, balance, posture, awareness, path. Therapeutic Activity Training:   Therapeutic activity training was instructed today. Cues were given for safety, sequence, UE/LE placement, awareness, and balance. Activities performed today included bed mobility training, sup-sit, sit-stand. Plan   Plan  Times per week: 3+  Current Treatment Recommendations: Strengthening, ROM, ADL/Self-care Training, Balance Training, Functional Mobility Training, IADL Training, Transfer Training, Endurance Training, Neuromuscular Re-education, Patient/Caregiver Education & Training, Pain Management, Equipment Evaluation, Education, & procurement, Gait Training, Home Exercise Program, Safety Education & Training, Positioning, Stair training, Cognitive Reorientation, Cognitive/Perceptual Training  Safety Devices  Type of devices: All fall risk precautions in place, Patient at risk for falls, Bed alarm in place, Left in bed, Call light within reach, Gait belt, Nurse notified      AM-PAC Score  AM-PAC Inpatient Mobility Raw Score : 16 (03/20/20 1612)  AM-PAC Inpatient T-Scale Score : 40.78 (03/20/20 1612)  Mobility Inpatient CMS 0-100% Score: 54.16 (03/20/20 1612)  Mobility Inpatient CMS G-Code Modifier : CK (03/20/20 1612)          Goals  Long term goals  Long term goal 1: In one week, pt will complete all bed mobility independently  Long term goal 2:  In one week, pt will complete sit <> stand transfers

## 2020-03-20 NOTE — PROGRESS NOTES
Hospitalist Progress Note      Name:  Chun Funez /Age/Sex: 1965  (62 y.o. male)   MRN & CSN:  [id number] & [id number] Admission Date/Time: 3/8/2020  3:44 AM   Location:  -A PCP: No primary care provider on file. Hospital Day: 13    Assessment and Plan:   Chun Funez is a 62 y.o.  male  who presents with <principal problem not specified>    > Severe sepsis  > Acute diverticulitis,   > Strep Pneumoniae Pneumonia  - HSV: Negative. Stopped acyclovir. CT abdomen pelvis: Diverticulitis; new bibasilar consolidations; possible cirrhosis. - rule out aseptic meningitis , CSF showed 6 WBCs and 100% lymphocytes. CSF culture: No growth to date  - Pulm, ID consulted  -Respiratory culture: Strep pneumo resistant to penicillin. Stopped Unasyn. On Rocephin and add Flagyl.   -Continue vancomycin. Blood culture: No growth. - Vanc, flagyl and rocephin discontinued  - 3/19 elevated leukocytosis 21 most likely sec to overnight arrhythmia, doubt infection, repeat blood cx ordered, ID on board. - 3/20 WBC improved 12    >NSTEMI with arrythmia/2:1 heart block  > V-fib  -Cardio following  -Cardiac cath: Mild CAD. Echo: 50-55%; severe eccentric aortic regurgitation. Aspirin.  -Off dopamine drip. - 3/19 had Vfib  for 31 sec overnight, converted back to yennifer and pt gained back conscious on his own before CPR start. Pt later on in the day after noon 3/19 had another incident where he had few chest compression and converted back to yennifer and gained conscious, cardiology notified, will take to cath lab and place temporary pacemaker. And started amiodarone. - 3/20 temporary pacemaker placed, planned for permanent pacemaker today    >Acute metabolic encephalopathy, Likely multifactorial from hyperammonemia, amphetamine use, acute kidney injury, sepsis. - Ammonia level:470, but quickly normalized. CT head non-acute. - Neuro consulted. CSF: slightly elevated WBCs. - PRN Ativan and Geodon.

## 2020-03-20 NOTE — CONSULTS
Department of Cardiovascular & Thoracic Surgery  F?U  Consult Note    Reason for Consult:     Patient is since been extubated 24 hours ago from prolonged vent support and remains in bradycardic episodes to reassess the patient for for placement of permanent pacemaker and to consider Advil replacement once patient makes further recovery    Since the consult is initiated patient had a cardiac arrest with CPR from atrial fibrillation  Recovered from the code  And he is being taken for temporary pacemaker for Dr. Jason Sanchez    Requesting Physician: Dr. Jason Sanchez    Date of Consult: 3/19/20      History Obtained From: EMR Dr. Zahira Porras patient's wife    HISTORY OF PRESENT ILLNESS:    The patient is a 62 y.o. male who presents with with history of altered mental status initially upon admission on 3/8/2020. He was found to be in heart failure underwent cardiac work-up noted to have valvular dysfunction and bradycardia  In the interim patient went respiratory failure requiring ventilatory support for extended period of time and has just been extubated about 24 hours ago  This afternoon he had a sudden episodes of ventricular tachycardia with fibrillation and cardiac arrest requiring CPR and defibrillation  . Past Medical History:        Diagnosis Date    Anxiety      Past Surgical History:    No past surgical history on file.   Current Medications:   Current Facility-Administered Medications: furosemide (LASIX) injection 40 mg, 40 mg, Intravenous, Once  potassium phosphate 15 mmol in dextrose 5 % 250 mL IVPB, 15 mmol, Intravenous, Once  diazePAM (VALIUM) tablet 5 mg, 5 mg, Oral, Q6H PRN  metoprolol tartrate (LOPRESSOR) tablet 50 mg, 50 mg, Oral, BID  dexmedetomidine (PRECEDEX) 400 mcg in sodium chloride 0.9 % 100 mL infusion, 0.2 mcg/kg/hr, Intravenous, Continuous  0.9 % sodium chloride infusion, , Intravenous, Continuous  ipratropium-albuterol (DUONEB) nebulizer solution 1 ampule, 1 ampule, Inhalation, Q4H WA  thiamine (B-1) 100 mg in sodium chloride 0.9 % 50 mL IVPB, 100 mg, Intravenous, Daily  ipratropium-albuterol (DUONEB) nebulizer solution 1 ampule, 1 ampule, Inhalation, Q4H PRN  magnesium oxide (MAG-OX) tablet 400 mg, 400 mg, Oral, BID  magnesium sulfate 1 g in dextrose 5% 100 mL IVPB, 1 g, Intravenous, PRN  ziprasidone (GEODON) injection 10 mg, 10 mg, Intramuscular, Q6H PRN  enoxaparin (LOVENOX) injection 30 mg, 30 mg, Subcutaneous, Daily  carboxymethylcellulose (THERATEARS) 1 % ophthalmic gel 1 drop, 1 drop, Both Eyes, Q2H PRN  sodium chloride flush 0.9 % injection 10 mL, 10 mL, Intravenous, 2 times per day  sodium chloride flush 0.9 % injection 10 mL, 10 mL, Intravenous, PRN  acetaminophen (TYLENOL) tablet 650 mg, 650 mg, Oral, Q4H PRN  sodium chloride flush 0.9 % injection 10 mL, 10 mL, Intravenous, BID  glucose (GLUTOSE) 40 % oral gel 15 g, 15 g, Oral, PRN  dextrose 50 % IV solution, 12.5 g, Intravenous, PRN  glucagon (rDNA) injection 1 mg, 1 mg, Intramuscular, PRN  dextrose 5 % solution, 100 mL/hr, Intravenous, PRN  sodium chloride flush 0.9 % injection 10 mL, 10 mL, Intravenous, 2 times per day  sodium chloride flush 0.9 % injection 10 mL, 10 mL, Intravenous, PRN  [DISCONTINUED] acetaminophen (TYLENOL) tablet 650 mg, 650 mg, Oral, Q6H PRN **OR** acetaminophen (TYLENOL) suppository 650 mg, 650 mg, Rectal, Q6H PRN  pantoprazole (PROTONIX) injection 40 mg, 40 mg, Intravenous, Daily  insulin lispro (HUMALOG) injection vial 0-12 Units, 0-12 Units, Subcutaneous, Q6H  aspirin chewable tablet 81 mg, 81 mg, Oral, Daily  Allergies:  No Known Allergies    Social History:   Social History     Socioeconomic History    Marital status: Single     Spouse name: Not on file    Number of children: Not on file    Years of education: Not on file    Highest education level: Not on file   Occupational History    Not on file   Social Needs    Financial resource strain: Not on file    Food insecurity     Worry: Not on file Inability: Not on file    Transportation needs     Medical: Not on file     Non-medical: Not on file   Tobacco Use    Smoking status: Never Smoker    Smokeless tobacco: Current User     Types: Snuff   Substance and Sexual Activity    Alcohol use: Yes     Comment: DAILY    Drug use: Never    Sexual activity: Not on file   Lifestyle    Physical activity     Days per week: Not on file     Minutes per session: Not on file    Stress: Not on file   Relationships    Social connections     Talks on phone: Not on file     Gets together: Not on file     Attends Sabianist service: Not on file     Active member of club or organization: Not on file     Attends meetings of clubs or organizations: Not on file     Relationship status: Not on file    Intimate partner violence     Fear of current or ex partner: Not on file     Emotionally abused: Not on file     Physically abused: Not on file     Forced sexual activity: Not on file   Other Topics Concern    Not on file   Social History Narrative    Not on file       Family History:        Problem Relation Age of Onset    Coronary Art Dis Mother     Cancer Father         tongue    Diabetes Other         aunt       REVIEW OF SYSTEMS:  Constitutional: + fatigue, - fever, - chills, - night sweats  Eyes: - vision loss  Cardiovascular: -  chest pain, - palpitations, - leg swelling, - leg pain   Respiratory: - cough, + shortness of breath, - wheezing   GI: - nausea, - vomiting, - abdominal pain, - constipation, - diarrhea   : - dysuria   MSK: - joint pain, - muscle pain  Integument: - rash, - skin color change   Heme: - easy bruising or bleeding  Neurologic: Altered mental status- headache, - weakness, - dizziness, - paresthesias       EXAM:  Constitutional: Blood pressure (!) 144/89, pulse 62 pacer dependent, temperature 97.9 °F (36.6 °C), temperature source Oral, resp. rate 22, height (S) 5' 10\" (1.778 m), weight 213 lb 3 oz (96.7 kg), SpO2 98 %.  No apparent distress,

## 2020-03-20 NOTE — PLAN OF CARE
Problem: Falls - Risk of:  Goal: Will remain free from falls  Description: Will remain free from falls  Outcome: Ongoing  Goal: Absence of physical injury  Description: Absence of physical injury  Outcome: Ongoing     Problem: Restraint Use - Nonviolent/Non-Self-Destructive Behavior:  Goal: Absence of restraint indications  Description: Absence of restraint indications  Outcome: Ongoing  Goal: Absence of restraint-related injury  Description: Absence of restraint-related injury  Outcome: Ongoing     Problem: Nutrition  Goal: Optimal nutrition therapy  Outcome: Ongoing     Problem: Pain:  Description: Pain management should include both nonpharmacologic and pharmacologic interventions. Goal: Pain level will decrease  Description: Pain level will decrease  Outcome: Ongoing  Goal: Control of acute pain  Description: Control of acute pain  Outcome: Ongoing  Goal: Control of chronic pain  Description: Control of chronic pain  Outcome: Ongoing     Problem: Discharge Planning:  Goal: Participates in care planning  Description: Participates in care planning  Outcome: Ongoing  Goal: Discharged to appropriate level of care  Description: Discharged to appropriate level of care  Outcome: Ongoing     Problem: Airway Clearance - Ineffective:  Goal: Ability to maintain a clear airway will improve  Description: Ability to maintain a clear airway will improve  Outcome: Ongoing     Problem: Anxiety/Stress:  Goal: Level of anxiety will decrease  Description: Level of anxiety will decrease  Outcome: Ongoing     Problem: Aspiration:  Goal: Absence of aspiration  Description: Absence of aspiration  Outcome: Ongoing     Problem:  Bowel Function - Altered:  Goal: Bowel elimination is within specified parameters  Description: Bowel elimination is within specified parameters  Outcome: Ongoing     Problem: Cardiac Output - Decreased:  Goal: Hemodynamic stability will improve  Description: Hemodynamic stability will improve  Outcome: Ongoing     Problem: Fluid Volume - Imbalance:  Goal: Absence of imbalanced fluid volume signs and symptoms  Description: Absence of imbalanced fluid volume signs and symptoms  Outcome: Ongoing     Problem: Mental Status - Impaired:  Goal: Mental status will be restored to baseline  Description: Mental status will be restored to baseline  Outcome: Ongoing     Problem: Nutrition Deficit:  Goal: Ability to achieve adequate nutritional intake will improve  Description: Ability to achieve adequate nutritional intake will improve  Outcome: Ongoing     Problem: Serum Glucose Level - Abnormal:  Goal: Ability to maintain appropriate glucose levels will improve to within specified parameters  Description: Ability to maintain appropriate glucose levels will improve to within specified parameters  Outcome: Ongoing     Problem: Skin Integrity - Impaired:  Goal: Will show no infection signs and symptoms  Description: Will show no infection signs and symptoms  Outcome: Ongoing  Goal: Absence of new skin breakdown  Description: Absence of new skin breakdown  Outcome: Ongoing     Problem: Sleep Pattern Disturbance:  Goal: Appears well-rested  Description: Appears well-rested  Outcome: Ongoing     Problem: Tissue Perfusion - Cardiopulmonary, Altered:  Goal: Absence of angina  Description: Absence of angina  Outcome: Ongoing  Goal: Hemodynamic stability will improve  Description: Hemodynamic stability will improve  Outcome: Ongoing

## 2020-03-20 NOTE — PROGRESS NOTES
Cardiology Progress Note       Darren Hernandez is a 62 y.o. male   1965     SUBJECTIVE:   Patient had episode of torsade V. fib  did not require cardioversion patient currently back to sinus rhythm magnesium was 1.9 currently patient is seen complete heart block  3/20  Patient  Had  Temp. Pacer  Placed        Sensing  And  Capturing  Well  PPM   Later  today  OBJECTIVE:    Review of Systems:  General appearance: alert, appears stated age and cooperative  Skin: Skin color, texture, normal. No rashes or lesions  HEENT: No nose bleed, headache, vision problems  CV: C/O chest pain, tightness, pressure,   Respiratory: C/o no SOB, JOHNS, Orthopnea, PND  GI: No abdominal pain, black stool, bloating  Limbs: No c/o edema, pain, swelling, intermittent claudication, joint pains  Neuro: No dizziness, lightheadedness, syncope, gait problems, memory problems  Psych: grossly normal. No SI/depression. Vitals:   Blood pressure (!) 144/89, pulse 98, temperature 97.9 °F (36.6 °C), temperature source Oral, resp. rate 22, height (S) 5' 10\" (1.778 m), weight 213 lb 3 oz (96.7 kg), SpO2 98 %.     HEENT: AT, NC, PERRLA  Neck: No JVD  Heart: S1 S2 audible, no murmur   Lungs:   bilat  Rhonchi         Abdomen: Nontender   Limbs: No edema   CNS: no focal deficit      Past Medical History:   Diagnosis Date    Anxiety         Patient Active Problem List   Diagnosis    Anxiety    Sepsis ([facility]    Metabolic encephalopathy    Delirium    Hyperammonemia (HCC)    Hyperglycemia    Diverticulitis of colon    Acute respiratory failure with hypoxia (HCC)        No Known Allergies     Current Inpatient Medications:    Current Facility-Administered Medications   Medication Dose Route Frequency Provider Last Rate Last Dose    potassium phosphate 15 mmol in dextrose 5 % 250 mL IVPB  15 mmol Intravenous Once Sheeba Lebron MD 62.5 mL/hr at 03/20/20 0902 15 mmol at 03/20/20 0902    diazePAM (VALIUM) tablet 5 mg  5 mg Oral Q6H PRN Sheeba Lebron MD 03/20/2020    ALT 39 03/20/2020     Hepatic Function Panel:    Lab Results   Component Value Date    ALKPHOS 100 03/20/2020    ALT 39 03/20/2020    AST 49 03/20/2020    PROT 5.9 03/20/2020    BILITOT 0.8 03/20/2020    BILIDIR 0.3 03/20/2020    IBILI 0.5 03/20/2020    LABALBU 3.5 03/20/2020     Magnesium:    Lab Results   Component Value Date    MG 2.3 03/19/2020     PT/INR:    Lab Results   Component Value Date    PROTIME 15.3 03/08/2020    INR 1.26 03/08/2020     Last 3 Troponin:  No results found for: TROPONINI  U/A:    Lab Results   Component Value Date    COLORU YELLOW 03/08/2020    WBCUA 10 03/08/2020    RBCUA 24 03/08/2020    MUCUS RARE 03/08/2020    TRICHOMONAS NONE SEEN 03/08/2020    BACTERIA OCCASIONAL 03/08/2020    CLARITYU HAZY 03/08/2020    SPECGRAV 1.032 03/08/2020    SPECGRAV  03/08/2020     (NOTE)  CONSIDER URINE OSMOLARITY TEST IF CLINICALLY INDICATED        LEUKOCYTESUR NEGATIVE 03/08/2020    UROBILINOGEN NORMAL 03/08/2020    BILIRUBINUR NEGATIVE 03/08/2020    BLOODU MODERATE 03/08/2020     ABG:    Lab Results   Component Value Date    PQJ1OQF 31.0 03/19/2020    PO2ART 62 03/19/2020    NXZ0XZP 23.6 03/19/2020     FLP:    Lab Results   Component Value Date    HDL 66 09/12/2019    LDLCALC 82 09/12/2019    LABVLDL 58 09/12/2019     TSH:    Lab Results   Component Value Date    TSH 1.80 09/12/2019      DATA:   ECG: Sinus Rhythm       ASSESSMENT:   1 severe sepsis GINGER no endocarditis  Severe    Aortic  Regurgitation  For  AVR   When  Stable  n    #Episode of  polymorphic V. tach /  torades not requiring cardioversion  Appeared to be torsades magnesium  Remains  In  Sinus    Repeat echo   Normal  LVF     2 acute respiratory failure currently extubated   Patient   Still   confused     3 non-STEMI Status Post  Cath   mild CAD no intervention    #Acute metabolic encephalopathy    5 severe eccentric regurgitation will need aortic valve replacement evaluations once stable recovers    6 alcoholism with

## 2020-03-20 NOTE — PROGRESS NOTES
Occupational Therapy  [facility] OCCUPATIONAL THERAPY EVALUATION    History  [place]:  The primary encounter diagnosis was Delirium. Diagnoses of Metabolic acidosis, Diverticulitis of colon, Hepatic encephalopathy ([facility] [address] Septicemia ([facility] [address] Elevated lactic acid level, Hyperglycemia, and Pleural effusion were also pertinent to this visit. Restrictions:  Restrictions/Precautions  Restrictions/Precautions: Fall Risk, General Precautions  Implants present? : Pacemaker    Position Activity Restriction  Other position/activity restrictions: pacemaker precautions                   Communication with other providers: RN, PT    Subjective:  Patient states:  Makes many confused and out of contexts statements. Frequently refers to how he was short of breath PTA  Pain:  No c/o  Patient goal:  Regain functional independence and improve aerobic capacity    Occupational profile (relevant social history and personal factors):    Social/Functional History  Lives With: Spouse  Type of Home: House  Home Layout: Two level, Bed/Bath upstairs(12 steps with a handrail to access second floor)  Home Access: Stairs to enter with rails  Entrance Stairs - Number of Steps: 3  Bathroom Shower/Tub: Walk-in shower  Bathroom Toilet: Standard  ADL Assistance: Independent  Homemaking Assistance: Independent  Ambulation Assistance: Independent  Transfer Assistance: Independent  Active : Yes  Occupation: Full time employment  Type of occupation:     Examination of body systems (includes body structures/functions, activity/participation limitations):  · Orientation: ox4  · Cognition:  Generally confused, but follows commands. Very impulsive and generally unsafe  · Observation:  Received pt in bed. Alert and cooperative.    · Vision:  [facility]   · Hearing:  WFL   · ROM:  WFL BUE  · Strength: WFL BUE  · Sensation: WFL BUE    ADLs  Feeding: SBA    Grooming: SBA    Dressing: UB CGA in seated LB at least CGA    Bathing: UB CGA in seated LB CGA    Toileting: CGA ; managed all buttock hygiene in seated ; needs steadying assistance    *Some ADL determined per observation of actual ADL performance, functional mobility, balance, activity tolerance, and cognition. AM-PAC 6 click short form for inpatient daily activity:  Raw Score: 18  24/24 = unimpaired  23/24 = 1-20% impaired   20/24-22/24 = 21-40% impaired  15/24-19/24 = 41-59% impaired   10/24-14/24 = 60%-79% impaired  7/24-9/24 = 80%-99% impaired  6/24 = 100% impaired    Functional Mobility  Bed mobility:   Supine to sit: CGA  Sit to supine: CGA    Sitting balance: CGA    Transfers:   Sit to stand: CGA  Stand to sit: CGA  *completed STS EOB and low commode, unsafe with approach and early descent to seats related to impulsive behavior    Standing balance: CGA during ADL    Ambulation:  ', pt prefers to hold IV pole ; see PT eval for gait analysis    Activity tolerance  WFL for basic Household mobility and ADL. high level activity limited by reduced endurance. Assessment:  Assessment  Performance deficits / Impairments: Decreased safe awareness  Treatment Diagnosis: new pacemaker  Prognosis: Good  Decision Making: Medium Complexity  REQUIRES OT FOLLOW UP: Yes  Discharge Recommendations: 24 hour supervision or assist    Pt is a 62year old M admitted from home for treated related to SOB, bradycardia, and ETOH abuse. Was intubated 3/08/2020 and extubated 3/18/2020. Had pacemaker placed 3/19. At baseline, he works and is independent with ADL and mobility. He does report increasing SOB for some time PTA and reduced ability to participate in leisure and exercise. Today, he presents with generally unsafe behavior, reduced endurance, but no significant balance impairments or difficulty with ADL      Goals:  By d/c or goals met:     Pt will perform all bed mobility with supervision in prep for EOB/OOB activity.    Pt will perform all functional transfers with supervision and appropriate use of LRD

## 2020-03-20 NOTE — PROGRESS NOTES
Infectious Disease Progress Note  3/20/2020   Patient Name: Dawne Closs : 1965   Impression  · Severe Sepsis, Streptococcus Pneumoniae:  ? Afebrile >48 hours  ? Leukocytosis spiked up to 21.3, may be stress-induced  , repeated blood cultures, back down to 12.3 today  ? 3/19-Had code blue event of VF approx 31 seconds, unresponsive and turned pale, VF broke to heart block with rate in 40s, repeated code blue -torsades-3/19-temp pacer placed per Dr. Beth Gamma  ? Sputum culture 3/8-Strep pneumoniae, Micrococcus Species, Gamma Strep  ? CSF culture 3/9-NGTD, Spinal Fluid analysis RBC 28, WBC 2, Lymphs 100, Segs 0  ? Blood Culture 3/8-(2 sets) -NGTD, 3/19-pending  ? HSV PCR 3/10-Negative  ? RDP 3/8-Negative  ? Procalictonin -3.72, 3/53-5.74, 3/13-0.74, 3/15-0.353, 3/17-0.204, 3/19-0.207  ? CRP 3/.1, 3/.7, 3/17-15.4  ? Imaging: 3/20/20 CXR reviewed, pacer in place RIJ   ? Imaging: 3/19/20-CXR reviewed, improved pulmonary edema . ? CT A&P 3/12-reviewed  · Subconjunctival Hemorrhage: Sclera and conjunctiva reddened and edematous, consultation for ophthalmology, Dr. Sasha Butts 3/16, impression of likely due to being on vent, no signs of orbital hemorrhage, should self resolve    · Acute Hypoxic Respiratory Failure:  Pulmonology onboard, Dr. Tarsha Andrade,  Intubated on  mechanical ventilation 3/8, weaned off vent 3/18  ? AR:  CTS onboard, Dr. Meg Cowan, will need LHC pending resolution of JUSTIN and resp failure to determine best timing for AVR  ? Diverticulitis:   ? JUSTIN from ATN: Nephrology onboard, Dr. Heredia, improving  ? Anion Gap Metabolic Acidosis:  resolved  ? Toxic/Metabolic Encephalopathy:  Neurology onboard, Dr. Zoe Mitchell, abnormal EEG, continuing to r/o meningitis/encephalitis. MRI brain no acute abnormality. HSV negative. Remains agitated and restless in bed, disoriented.    ? Bradycardia:  Cardio onboard, Dr. Kirit Bazzi, underwent [facility] 3/8/20: EDP 25-30, left main patent, LAD, cir, ramus and RCA all mild CAD. TTE 3/9-EF 50-55%, trileaflet AV with severe AR. · Multi-morbidity: per PMHx:  Anxiety, occasional alcohol use   Plan:  · Continue off ABX therapy, will reassess on a daily basis  · Await blood cultures and CVC tip culture from 3/19  · Follow with cardio, temp pacer placed RIJ 3/19 post cardiac arrest, PPM tentative today around noon  · PLAN:  If patient becomes hemodynamically unstable or shows an in increase fever: repeat blood cultures, check urine and sputum cultures and start vancomycin and meropenem     Ongoing Antimicrobial Therapy  DCd 3/17    Completed Antimicrobial Therapy   Vancomycin 3/13-17  Ceftriaxone 3/14-17  Flagyl 3/14-17  Ampicillin-sulbactam 3/9-14  Acyclovir 3/11-12  Cefepime 3/8  Zosyn 3/8-9  Ceftriaxone 3/9? Review of Systems   Unable to perform ROS: Mental status change   Severe sepsis, streptococcus pneumoniae. Physical Exam:  Vital Signs: BP (!) 144/89   Pulse 98   Temp 97.9 °F (36.6 °C) (Oral)   Resp 22   Ht (S) 5' 10\" (1.778 m)   Wt 213 lb 3 oz (96.7 kg)   SpO2 98%   BMI 30.59 kg/m²     Gen: Confused and disoriented, no distress  Skin: no stigmata of endocarditis  HEMT: AT/NC Oropharynx pink, moist, and without lesions or exudates; dentition in good state of repair  Eyes: PERRLA, EOMI, conjunctiva pink right eye, sclera anicteric right eye. Left eye is bulging, with reddened sclera, reddened and edematous conjunctiva. Neck: Supple. Trachea midline. No LAD. Chest: no distress and CTA. Good air movement. Oxygen per NC   Heart: RRR and no MRG. Abd: soft, non-distended, no tenderness, no hepatomegaly. Normoactive bowel sounds. OG to LIS draining hendrix drainage. Ext: no clubbing, cyanosis, or edema  Temporary Pacer: Intact RIJ without erythema or edema at site  Catheter Site: without erythema or tenderness draining clear yellow urine  Neuro: Confused and disoriented.  CN 2-12 intact and no focal sensory or motor deficits     Radiologic / Imaging / TESTING  3/12/20 XR Chest Portable:  Impression   1. Endotracheal tube in satisfactory position above the esa   2. Otherwise, stable chest demonstrating bibasilar hypoaeration     3/13/20 XR Chest Portable:  Impression   Support tubes and lines as above.  Layering bilateral pleural effusions   adjacent atelectasis. 3/16/20 XR Chest Portable:  Impression   Support lines in good position       Stable congestive changes. Labs:       CULTURE results: Invalid input(s): BLOOD CULTURE,  URINE CULTURE, SURGICAL CULTURE    Diagnosis:  Patient Active Problem List   Diagnosis    Anxiety    Sepsis ([address]    Metabolic encephalopathy    Delirium    Hyperammonemia ([address]    Hyperglycemia    Diverticulitis of colon    Acute respiratory failure with hypoxia (HCC)       Active Problems  Active Problems:    Sepsis ([address]    Metabolic encephalopathy    Delirium    Hyperammonemia (HCC)    Hyperglycemia    Diverticulitis of colon    Acute respiratory failure with hypoxia (HCC)  Resolved Problems:    * No resolved hospital problems. *    Electronically signed by: Electronically signed by ROSALIA Gloria CNP on 3/20/2020 at 11:00 AM

## 2020-03-20 NOTE — PROGRESS NOTES
Dr. Soham Vines called and wanted an update on Pt's current status. Informed him Pt had a total urine output out of his ryan catheter of 2,500 cc for night shift after receiving an ordered dose of Lasix last night. Dr. Soham Vines requested a CVP be obtained from Pt. This RN and the ICU Charge RN paused Pt's maintence IV fluids and Precedex gtt and hooked up a CVP to Pt's Right IJ venous sheath and Pt's CVP was 16. Dr. Soham Vines stated he is going to place an order for another dose of Lasix IV to be administered to Pt- please see new order and Pt's MAR.

## 2020-03-20 NOTE — PROGRESS NOTES
EDUCATION: recommendations and safe feeding protocol discussed with patient and nursing     PAIN RATING (0-10 Scale): denies   Time in/Time out: SLP Individual Minutes  Time In: 1320  Time Out: 1350  Minutes: 30    Visit number: [address], [facility], 3/20/2020

## 2020-03-20 NOTE — ANESTHESIA PRE PROCEDURE
Department of Anesthesiology  Preprocedure Note       Name:  Latonya Bhatt   Age:  62 y.o.  :  1965                                          MRN:  [id number]         Date:  3/20/2020      Surgeon: Diana Bergman):  Joselyn Marquez MD    Procedure: PACEMAKER INSERTION PERMANENT (N/A Chest)    Medications prior to admission:   Prior to Admission medications    Medication Sig Start Date End Date Taking?  Authorizing Provider   benzonatate (TESSALON PERLES) 100 MG capsule Take 1 capsule by mouth 3 times daily as needed for Cough 20   ROSALIA Elliott - CNP       Current medications:    Current Facility-Administered Medications   Medication Dose Route Frequency Provider Last Rate Last Dose    potassium phosphate 15 mmol in dextrose 5 % 250 mL IVPB  15 mmol Intravenous Once Damaris Portillo MD 62.5 mL/hr at 20 0902 15 mmol at 20 0902    diazePAM (VALIUM) tablet 5 mg  5 mg Oral Q6H PRN Damaris Portillo MD        metoprolol tartrate (LOPRESSOR) tablet 50 mg  50 mg Oral BID Damaris Portillo MD   Stopped at 20 0850    dexmedetomidine (PRECEDEX) 400 mcg in sodium chloride 0.9 % 100 mL infusion  0.2 mcg/kg/hr Intravenous Continuous Damaris Portillo MD 7.3 mL/hr at 20 0731 0.3 mcg/kg/hr at 20 0731    0.9 % sodium chloride infusion   Intravenous Continuous Damaris Portillo MD 10 mL/hr at 20 1853      ipratropium-albuterol (DUONEB) nebulizer solution 1 ampule  1 ampule Inhalation Q4H ELEN Michaels MD   1 ampule at 20 0828    thiamine (B-1) 100 mg in sodium chloride 0.9 % 50 mL IVPB  100 mg Intravenous Daily Sandradimitry Mcdonald  mL/hr at 20 0855 100 mg at 20 0855    ipratropium-albuterol (DUONEB) nebulizer solution 1 ampule  1 ampule Inhalation Q4H PRN Conrado Kirk MD   1 ampule at 20 1823    magnesium oxide (MAG-OX) tablet 400 mg  400 mg Oral BID Damaris Portillo MD   Stopped at 20 0849    magnesium sulfate 1 g in dextrose 5% 100 Patient Active Problem List   Diagnosis Code    Anxiety F41.9    [facility] W92.5    Metabolic encephalopathy Q62.80    Delirium R41.0    [facility] E72.20    Hyperglycemia R73.9    Diverticulitis of colon K57.32    Acute respiratory failure with hypoxia ([facility] J96.01       Past Medical History:        Diagnosis Date    Anxiety        Past Surgical History:  No past surgical history on file. Social History:    Social History     Tobacco Use    Smoking status: Never Smoker    Smokeless tobacco: Current User     Types: Snuff   Substance Use Topics    Alcohol use: Yes     Comment: DAILY                                Ready to quit: Not Answered  Counseling given: Not Answered      Vital Signs (Current):   Vitals:    03/20/20 0500 03/20/20 0600 03/20/20 0803 03/20/20 0812   BP: (!) 146/81 138/85 (!) 144/89    Pulse: 98 98 98 98   Resp: 25 25 17 22   Temp:   97.9 °F (36.6 °C)    TempSrc:   Oral    SpO2: 96% 95% 97% 98%   Weight:       Height:                                                  BP Readings from Last 3 Encounters:   03/20/20 (!) 144/89   01/07/20 136/64   09/12/19 126/68       NPO Status:                                                                                 BMI:   Wt Readings from Last 3 Encounters:   03/19/20 213 lb 3 oz (96.7 kg)   01/07/20 217 lb (98.4 kg)   09/12/19 211 lb 9.6 oz (96 kg)     Body mass index is 30.59 kg/m².     CBC:   Lab Results   Component Value Date    WBC 12.3 03/20/2020    RBC 3.53 03/20/2020    HGB 10.4 03/20/2020    HCT 33.5 03/20/2020    MCV 94.9 03/20/2020    RDW 15.7 03/20/2020     03/20/2020       CMP:   Lab Results   Component Value Date     03/20/2020    K 3.7 03/20/2020     03/20/2020    CO2 27 03/20/2020    BUN 22 03/20/2020    CREATININE 1.2 03/20/2020    GFRAA >60 03/20/2020    AGRATIO 1.7 09/12/2019    LABGLOM >60 03/20/2020    GLUCOSE 103 03/20/2020    PROT 5.9 03/20/2020    CALCIUM 9.1 03/20/2020    BILITOT 0.8

## 2020-03-20 NOTE — PROGRESS NOTES
A Representative from [employer] was just here to obtain a facesheet on Pt and he looked at a couple of Pt's EKG's. He saw Pt's EKG dated from 3/19/20 at 0621 where he had a 31 second run of V-tach. He called Dr. Momo Velazquez and he stated Dr. Momo Velazquez stated to him that he was made aware of this and Dr. Anastacio Rain took a look at this EKG and feels that the 31 second run of V-tach was bradycardia induced.

## 2020-03-20 NOTE — PROGRESS NOTES
[facility] and OR RNs are currently here to transport Pt to the OR per ICU bed on cardiac monitor. Pt's wife stated she will remain in Pt's room until he returns from the OR.

## 2020-03-21 ENCOUNTER — APPOINTMENT (OUTPATIENT)
Dept: GENERAL RADIOLOGY | Age: 55
DRG: 853 | End: 2020-03-21
Payer: COMMERCIAL

## 2020-03-21 LAB
ALBUMIN SERPL-MCNC: 3.6 GM/DL (ref 3.4–5)
ALP BLD-CCNC: 91 IU/L (ref 40–129)
ALT SERPL-CCNC: 32 U/L (ref 10–40)
ANION GAP SERPL CALCULATED.3IONS-SCNC: 13 MMOL/L (ref 4–16)
AST SERPL-CCNC: 31 IU/L (ref 15–37)
BILIRUB SERPL-MCNC: 0.6 MG/DL (ref 0–1)
BILIRUBIN DIRECT: 0.3 MG/DL (ref 0–0.3)
BILIRUBIN, INDIRECT: 0.3 MG/DL (ref 0–0.7)
BUN BLDV-MCNC: 21 MG/DL (ref 6–23)
CALCIUM SERPL-MCNC: 8.8 MG/DL (ref 8.3–10.6)
CHLORIDE BLD-SCNC: 99 MMOL/L (ref 99–110)
CO2: 30 MMOL/L (ref 21–32)
CREAT SERPL-MCNC: 1.1 MG/DL (ref 0.9–1.3)
EKG ATRIAL RATE: 77 BPM
EKG DIAGNOSIS: NORMAL
EKG Q-T INTERVAL: 508 MS
EKG QRS DURATION: 192 MS
EKG QTC CALCULATION (BAZETT): 548 MS
EKG R AXIS: -73 DEGREES
EKG T AXIS: 106 DEGREES
EKG VENTRICULAR RATE: 70 BPM
GFR AFRICAN AMERICAN: >60 ML/MIN/1.73M2
GFR NON-AFRICAN AMERICAN: >60 ML/MIN/1.73M2
GLUCOSE BLD-MCNC: 101 MG/DL (ref 70–99)
GLUCOSE BLD-MCNC: 112 MG/DL (ref 70–99)
GLUCOSE BLD-MCNC: 118 MG/DL (ref 70–99)
HCT VFR BLD CALC: 35.6 % (ref 42–52)
HEMOGLOBIN: 10.9 GM/DL (ref 13.5–18)
MCH RBC QN AUTO: 28.8 PG (ref 27–31)
MCHC RBC AUTO-ENTMCNC: 30.6 % (ref 32–36)
MCV RBC AUTO: 94.2 FL (ref 78–100)
PDW BLD-RTO: 15.3 % (ref 11.7–14.9)
PLATELET # BLD: 445 K/CU MM (ref 140–440)
PMV BLD AUTO: 9.8 FL (ref 7.5–11.1)
POTASSIUM SERPL-SCNC: 3.4 MMOL/L (ref 3.5–5.1)
POTASSIUM SERPL-SCNC: 3.5 MMOL/L (ref 3.5–5.1)
PROCALCITONIN: 0.37
RBC # BLD: 3.78 M/CU MM (ref 4.6–6.2)
SODIUM BLD-SCNC: 142 MMOL/L (ref 135–145)
TOTAL PROTEIN: 6 GM/DL (ref 6.4–8.2)
WBC # BLD: 14.5 K/CU MM (ref 4–10.5)

## 2020-03-21 PROCEDURE — 71046 X-RAY EXAM CHEST 2 VIEWS: CPT

## 2020-03-21 PROCEDURE — 82248 BILIRUBIN DIRECT: CPT

## 2020-03-21 PROCEDURE — 82962 GLUCOSE BLOOD TEST: CPT

## 2020-03-21 PROCEDURE — 6370000000 HC RX 637 (ALT 250 FOR IP): Performed by: INTERNAL MEDICINE

## 2020-03-21 PROCEDURE — 85027 COMPLETE CBC AUTOMATED: CPT

## 2020-03-21 PROCEDURE — 2700000000 HC OXYGEN THERAPY PER DAY

## 2020-03-21 PROCEDURE — 2580000003 HC RX 258: Performed by: INTERNAL MEDICINE

## 2020-03-21 PROCEDURE — 6360000002 HC RX W HCPCS: Performed by: INTERNAL MEDICINE

## 2020-03-21 PROCEDURE — 2140000000 HC CCU INTERMEDIATE R&B

## 2020-03-21 PROCEDURE — C9113 INJ PANTOPRAZOLE SODIUM, VIA: HCPCS | Performed by: INTERNAL MEDICINE

## 2020-03-21 PROCEDURE — 84145 PROCALCITONIN (PCT): CPT

## 2020-03-21 PROCEDURE — 94761 N-INVAS EAR/PLS OXIMETRY MLT: CPT

## 2020-03-21 PROCEDURE — 94640 AIRWAY INHALATION TREATMENT: CPT

## 2020-03-21 PROCEDURE — 80053 COMPREHEN METABOLIC PANEL: CPT

## 2020-03-21 PROCEDURE — 84132 ASSAY OF SERUM POTASSIUM: CPT

## 2020-03-21 PROCEDURE — 93010 ELECTROCARDIOGRAM REPORT: CPT | Performed by: INTERNAL MEDICINE

## 2020-03-21 RX ORDER — LISINOPRIL 10 MG/1
10 TABLET ORAL DAILY
Status: DISCONTINUED | OUTPATIENT
Start: 2020-03-22 | End: 2020-03-24 | Stop reason: HOSPADM

## 2020-03-21 RX ORDER — POTASSIUM CHLORIDE 20 MEQ/1
40 TABLET, EXTENDED RELEASE ORAL PRN
Status: DISCONTINUED | OUTPATIENT
Start: 2020-03-21 | End: 2020-03-24 | Stop reason: HOSPADM

## 2020-03-21 RX ORDER — METOPROLOL SUCCINATE 50 MG/1
50 TABLET, EXTENDED RELEASE ORAL DAILY
Status: DISCONTINUED | OUTPATIENT
Start: 2020-03-21 | End: 2020-03-24 | Stop reason: HOSPADM

## 2020-03-21 RX ORDER — POTASSIUM CHLORIDE 7.45 MG/ML
10 INJECTION INTRAVENOUS PRN
Status: DISCONTINUED | OUTPATIENT
Start: 2020-03-21 | End: 2020-03-24 | Stop reason: HOSPADM

## 2020-03-21 RX ADMIN — THIAMINE HYDROCHLORIDE 100 MG: 100 INJECTION, SOLUTION INTRAMUSCULAR; INTRAVENOUS at 08:24

## 2020-03-21 RX ADMIN — ASPIRIN 81 MG 81 MG: 81 TABLET ORAL at 08:25

## 2020-03-21 RX ADMIN — ENOXAPARIN SODIUM 30 MG: 30 INJECTION SUBCUTANEOUS at 08:24

## 2020-03-21 RX ADMIN — Medication 400 MG: at 08:24

## 2020-03-21 RX ADMIN — SODIUM CHLORIDE, PRESERVATIVE FREE 10 ML: 5 INJECTION INTRAVENOUS at 08:32

## 2020-03-21 RX ADMIN — IPRATROPIUM BROMIDE AND ALBUTEROL SULFATE 1 AMPULE: .5; 3 SOLUTION RESPIRATORY (INHALATION) at 20:27

## 2020-03-21 RX ADMIN — FUROSEMIDE 20 MG: 20 TABLET ORAL at 08:24

## 2020-03-21 RX ADMIN — IPRATROPIUM BROMIDE AND ALBUTEROL SULFATE 1 AMPULE: .5; 3 SOLUTION RESPIRATORY (INHALATION) at 07:47

## 2020-03-21 RX ADMIN — DIAZEPAM 5 MG: 5 TABLET ORAL at 22:04

## 2020-03-21 RX ADMIN — POTASSIUM CHLORIDE 20 MEQ: 1500 TABLET, EXTENDED RELEASE ORAL at 08:32

## 2020-03-21 RX ADMIN — LISINOPRIL 5 MG: 5 TABLET ORAL at 08:25

## 2020-03-21 RX ADMIN — METOPROLOL TARTRATE 50 MG: 25 TABLET, FILM COATED ORAL at 08:25

## 2020-03-21 RX ADMIN — PANTOPRAZOLE SODIUM 40 MG: 40 INJECTION, POWDER, FOR SOLUTION INTRAVENOUS at 08:24

## 2020-03-21 RX ADMIN — SODIUM CHLORIDE, PRESERVATIVE FREE 10 ML: 5 INJECTION INTRAVENOUS at 22:01

## 2020-03-21 RX ADMIN — METOPROLOL SUCCINATE 50 MG: 50 TABLET, EXTENDED RELEASE ORAL at 16:46

## 2020-03-21 RX ADMIN — Medication 400 MG: at 22:01

## 2020-03-21 ASSESSMENT — PAIN DESCRIPTION - FREQUENCY: FREQUENCY: CONTINUOUS

## 2020-03-21 ASSESSMENT — PAIN - FUNCTIONAL ASSESSMENT: PAIN_FUNCTIONAL_ASSESSMENT: PREVENTS OR INTERFERES SOME ACTIVE ACTIVITIES AND ADLS

## 2020-03-21 ASSESSMENT — PAIN DESCRIPTION - LOCATION: LOCATION: CHEST

## 2020-03-21 ASSESSMENT — PAIN SCALES - GENERAL
PAINLEVEL_OUTOF10: 0
PAINLEVEL_OUTOF10: 0
PAINLEVEL_OUTOF10: 3
PAINLEVEL_OUTOF10: 0

## 2020-03-21 ASSESSMENT — PAIN DESCRIPTION - DESCRIPTORS: DESCRIPTORS: ACHING

## 2020-03-21 ASSESSMENT — PAIN DESCRIPTION - PAIN TYPE: TYPE: SURGICAL PAIN

## 2020-03-21 ASSESSMENT — PAIN DESCRIPTION - ORIENTATION: ORIENTATION: LEFT

## 2020-03-21 NOTE — PROGRESS NOTES
Daily Progress Note     patient is awake more alert  Moved out of ICU  Remained paced AV paced  Rate is in 70's   Chest xray today pending   HTN adjust meds  AI surgery once stable  Supportive care  Increase activity  Would need rehab      Cath-DICTATED --[id number]  LEFT MAIN PATENT  LAD/RAMUS/LCX AND RCA MILD DX  LVEDP 25-30  NON OBSTRUCTIVE CAD    Ryley--Summary   Left ventricular systolic function is normal.   Ejection fraction is visually estimated at 50-55%. Mildly dilated left ventricle. Severe eccentric aortic regurgitation. Possible fenestration and prolapse of the aortic valve. Mild to moderate mitral regurgitation is present. There was no thrombus noted in the left atrial appendage. Negative bubble study. No PFO or ASD noted.          Objective:   BP (!) 141/70   Pulse 70   Temp 97.8 °F (36.6 °C)   Resp 13   Ht (S) 5' 10\" (1.778 m)   Wt 202 lb 9.6 oz (91.9 kg)   SpO2 98%   BMI 29.07 kg/m²       Intake/Output Summary (Last 24 hours) at 3/21/2020 1143  Last data filed at 3/21/2020 0457  Gross per 24 hour   Intake 1309 ml   Output 875 ml   Net 434 ml       Medications:   Scheduled Meds:   sodium chloride flush  10 mL Intravenous 2 times per day    furosemide  20 mg Oral Daily    potassium chloride  20 mEq Oral Daily    lisinopril  5 mg Oral Daily    metoprolol tartrate  50 mg Oral BID    ipratropium-albuterol  1 ampule Inhalation Q4H WA    thiamine (VITAMIN B1) IVPB  100 mg Intravenous Daily    magnesium oxide  400 mg Oral BID    enoxaparin  30 mg Subcutaneous Daily    sodium chloride flush  10 mL Intravenous 2 times per day    sodium chloride flush  10 mL Intravenous BID    sodium chloride flush  10 mL Intravenous 2 times per day    pantoprazole  40 mg Intravenous Daily    insulin lispro  0-12 Units Subcutaneous Q6H    aspirin  81 mg Oral Daily      Infusions:   dexmedetomidine (PRECEDEX) IV infusion Stopped (03/20/20 1600)    dextrose 100 mL/hr (03/17/20 2014)      PRN

## 2020-03-21 NOTE — PROGRESS NOTES
Normal lips, teeth, and gums. No nasal discharge. Neck:  Supple  Heart:  RRR, no murmurs, gallops, rubs  Lungs:  CTA bilaterally, bilat symmetrical expansion, no wheeze, rales, or rhonchi  Abdomen:   Bowel sounds present, soft, nontender, no masses, no organomegaly, no peritoneal signs  Extremities:  No clubbing, cyanosis, or edema  Skin:  Warm and dry, no open lesions or rash  Breast: deferred  Rectal: deferred  Genitalia:  deferred    NEUROLOGICAL EXAM  ---------------------------------    Mental Status Exam:                Not Confused  follows commands  EOMI pupils 3 mm russell  5/5 russell UE LE  resp on own              CBC with Differential:    Lab Results   Component Value Date    WBC 12.3 03/20/2020    RBC 3.53 03/20/2020    HGB 10.4 03/20/2020    HCT 33.5 03/20/2020     03/20/2020    MCV 94.9 03/20/2020    MCH 29.5 03/20/2020    MCHC 31.0 03/20/2020    RDW 15.7 03/20/2020    SEGSPCT 68.0 03/15/2020    BANDSPCT 12 03/15/2020    LYMPHOPCT 11.0 03/15/2020    MONOPCT 9.0 03/15/2020    BASOPCT 1.1 03/14/2020    MONOSABS 0.7 03/15/2020    LYMPHSABS 0.8 03/15/2020    EOSABS 0.2 03/14/2020    BASOSABS 0.1 03/14/2020    DIFFTYPE MANUAL DIFFERENTIAL 03/15/2020     CMP:    Lab Results   Component Value Date     03/20/2020    K 3.7 03/20/2020     03/20/2020    CO2 27 03/20/2020    BUN 22 03/20/2020    CREATININE 1.2 03/20/2020    GFRAA >60 03/20/2020    AGRATIO 1.7 09/12/2019    LABGLOM >60 03/20/2020    GLUCOSE 103 03/20/2020    PROT 5.9 03/20/2020    LABALBU 3.5 03/20/2020    CALCIUM 9.1 03/20/2020    BILITOT 0.8 03/20/2020    ALKPHOS 100 03/20/2020    AST 49 03/20/2020    ALT 39 03/20/2020     BMP:    Lab Results   Component Value Date     03/20/2020    K 3.7 03/20/2020     03/20/2020    CO2 27 03/20/2020    BUN 22 03/20/2020    LABALBU 3.5 03/20/2020    CREATININE 1.2 03/20/2020    CALCIUM 9.1 03/20/2020    GFRAA >60 03/20/2020    LABGLOM >60 03/20/2020    GLUCOSE 103 03/20/2020     PT/INR:

## 2020-03-21 NOTE — FLOWSHEET NOTE
Note that pt monitor rhythm the pacer spike is hitting in the peak of the QRS. EKG done. Took rhythm strip over to Darby Rn in CVICU to look at and she states \"failure to sense\". Dr Prasanth Solis called and informed. No further orders, but states may interrogate pacer in the morning.

## 2020-03-21 NOTE — PROGRESS NOTES
Pulmonary and Critical Care  Progress Note    Subjective: The patient is doing better. Shortness of breath none. Chest pain none. Addressing respiratory complaints Patient is negative for  hemoptysis and cyanosis. CONSTITUTIONAL:  negative for fevers and chills. Past Medical History:     has a past medical history of Anxiety. has no past surgical history on file. reports that he has never smoked. His smokeless tobacco use includes snuff. He reports current alcohol use. He reports that he does not use drugs. Family history:  family history includes Cancer in his father; Coronary Art Dis in his mother; Diabetes in an other family member. No Known Allergies  Social History:    Reviewed; no changes    Objective:   PHYSICAL EXAM:        VITALS:  BP (!) 141/70   Pulse 70   Temp 97.8 °F (36.6 °C)   Resp 13   Ht (S) 5' 10\" (1.778 m)   Wt 202 lb 9.6 oz (91.9 kg)   SpO2 98%   BMI 29.07 kg/m²     24HR INTAKE/OUTPUT:      Intake/Output Summary (Last 24 hours) at 3/21/2020 1219  Last data filed at 3/21/2020 0457  Gross per 24 hour   Intake 1309 ml   Output 875 ml   Net 434 ml       CONSTITUTIONAL: Alert. LUNGS:  decreased breath sounds, occ basilar crackles. CARDIOVASCULAR:  normal S1 and S2 and negative JVD  ABD:Abdomen soft, non-tender. BS normal. No masses,  No organomegaly  NEURO:Sedated on vent.   DATA:    CBC:  Recent Labs     03/19/20  0500 03/20/20  0340 03/21/20  0530   WBC 21.3* 12.3* 14.5*   RBC 3.84* 3.53* 3.78*   HGB 11.1* 10.4* 10.9*   HCT 36.3* 33.5* 35.6*   * 363 445*   MCV 94.5 94.9 94.2   MCH 28.9 29.5 28.8   MCHC 30.6* 31.0* 30.6*   RDW 15.4* 15.7* 15.3*      BMP:  Recent Labs     03/19/20  1840 03/20/20  0340 03/21/20  0530    143 142   K 4.0 3.7 3.4*   CL 97* 100 99   CO2 27 27 30   BUN 21 22 21   CREATININE 1.2 1.2 1.1   CALCIUM 9.4 9.1 8.8   GLUCOSE 107* 103* 118*      ABG:  Recent Labs     03/19/20  0630 03/19/20  1645   PH 7.51* 7.49*   PO2ART 49* 62*   TEY1WIO 35.0 31.0*   O2SAT 86.6* 91.8*     Lab Results   Component Value Date    PROBNP 722.7 (H) 03/13/2020     No results found for: [facility]    Radiology Review:  Pertinent images / reports were reviewed as a part of this visit. Assessment:     Patient Active Problem List   Diagnosis    Anxiety    Sepsis ([facility] [facility]    Metabolic encephalopathy    Delirium    Hyperammonemia (HCC)    Hyperglycemia    Diverticulitis of colon    Acute respiratory failure with hypoxia ([facility]       Plan:   1. Inc. Activity. 2. A/B as per ID.  3. Supp. kcl.   Gala West MD  3/21/2020  12:19 PM

## 2020-03-21 NOTE — PROGRESS NOTES
Hospitalist Progress Note      Name:  Darren Hernandez /Age/Sex: 1965  (62 y.o. male)   MRN & CSN:  [id number] & [id number] Admission Date/Time: 3/8/2020  3:44 AM   Location:  -A PCP: No primary care provider on file. Hospital Day: 14    Assessment and Plan:   Darren Hernandez is a 62 y.o.  male  who presents with <principal problem not specified>    > Severe sepsis  > Acute diverticulitis,   > Strep Pneumoniae Pneumonia  - HSV: Negative. Stopped acyclovir. CT abdomen pelvis: Diverticulitis; new bibasilar consolidations; possible cirrhosis. - rule out aseptic meningitis , CSF showed 6 WBCs and 100% lymphocytes. CSF culture: No growth to date  - Pulm, ID consulted  -Respiratory culture: Strep pneumo resistant to penicillin. Stopped Unasyn. On Rocephin and add Flagyl.   -Continue vancomycin. Blood culture: No growth. - Vanc, flagyl and rocephin discontinued  - 3/19 elevated leukocytosis 21 most likely sec to overnight arrhythmia, doubt infection, repeat blood cx ordered, ID on board. - improved on 4 L./min NC,  Monitor WBC off Abx, transfer to Med surg, PT/OT/DC planning    >NSTEMI with arrythmia/2:1 heart block  > V-fib  -Cardio following  -Cardiac cath: Mild CAD. Echo: 50-55%; severe eccentric aortic regurgitation. Aspirin.  -Off dopamine drip. - 3/19 had Vfib  for 31 sec overnight, converted back to yennifer and pt gained back conscious on his own before CPR start. Pt later on in the day after noon 3/19 had another incident where he had few chest compression and converted back to yennifer and gained conscious, cardiology notified, started amiodarone. Had temporary pacemaker placed. - 3/20 permanent pacemaker placed by CT surgery    >Acute metabolic encephalopathy, Likely multifactorial from hyperammonemia, amphetamine use, acute kidney injury, sepsis. - Ammonia level:470, but quickly normalized. CT head non-acute. - Neuro consulted. CSF: slightly elevated WBCs.     - PRN Illness:     Pt S&E. Pt oriented x 3 , no chest pain, no abd pain, no N/V, improved dyspnea. 10-14 point ROS reviewed negative, unless as noted above    Objective: Intake/Output Summary (Last 24 hours) at 3/21/2020 1001  Last data filed at 3/21/2020 0457  Gross per 24 hour   Intake 1309 ml   Output 875 ml   Net 434 ml      Vitals:   Vitals:    03/21/20 0905   BP: (!) 155/74   Pulse: 71   Resp: 13   Temp:    SpO2: 98%     Physical Exam:      GEN Awake male,  cooperative, no apparent distress. RESP Decreased air sounds. Symmetric chest movement . CARDIO/VASC S1/S2 auscultated. Regular rate. GI Abdomen is soft without significant tenderness, Bowel sounds are normoactive. MSK No gross joint deformities. Spontaneous movement of all extremities  SKIN Normal coloration, warm, dry. NEURO normal speech, no lateralizing weakness. PSYCH Awake, alert, oriented x 3.       Medications:   Medications:    sodium chloride flush  10 mL Intravenous 2 times per day    furosemide  20 mg Oral Daily    potassium chloride  20 mEq Oral Daily    lisinopril  5 mg Oral Daily    metoprolol tartrate  50 mg Oral BID    ipratropium-albuterol  1 ampule Inhalation Q4H WA    thiamine (VITAMIN B1) IVPB  100 mg Intravenous Daily    magnesium oxide  400 mg Oral BID    enoxaparin  30 mg Subcutaneous Daily    sodium chloride flush  10 mL Intravenous 2 times per day    sodium chloride flush  10 mL Intravenous BID    sodium chloride flush  10 mL Intravenous 2 times per day    pantoprazole  40 mg Intravenous Daily    insulin lispro  0-12 Units Subcutaneous Q6H    aspirin  81 mg Oral Daily      Infusions:    dexmedetomidine (PRECEDEX) IV infusion Stopped (03/20/20 1600)    dextrose 100 mL/hr (03/17/20 2014)     PRN Meds: sodium chloride flush, 10 mL, PRN  acetaminophen, 650 mg, Q4H PRN  diazePAM, 5 mg, Q6H PRN  ipratropium-albuterol, 1 ampule, Q4H PRN  magnesium sulfate, 1 g, PRN  carboxymethylcellulose, 1 drop, Q2H PRN  sodium chloride flush, 10 mL, PRN  acetaminophen, 650 mg, Q4H PRN  glucose, 15 g, PRN  dextrose, 12.5 g, PRN  glucagon (rDNA), 1 mg, PRN  dextrose, 100 mL/hr, PRN  sodium chloride flush, 10 mL, PRN  acetaminophen, 650 mg, Q6H PRN          Electronically signed by Dewayne De Paz MD on 3/21/2020 at 10:01 AM

## 2020-03-21 NOTE — OP NOTE
were obtained. There was no diaphragmatic pacing at 10V on either lead. Leads were secured to the cephalic vein and pectoralis fascia. Leads were connected to their respective locations on the [employer] generator. The generator was placed in a pocked under the lower skin flap and anchored to the pectoralis fascia. Wound was irrigated with antibiotic solution and closed in layers. temp wire removed under fluro    position and thresholds of both leads rechecked      Blood loss was minimal. Sponge and instrument count was correct before closure. The lead thresholds were rechecked after closure and a lateral view of the chest was obtained to confirm the position of the leads. Patient tolerated the procedure well.

## 2020-03-22 LAB
ALBUMIN SERPL-MCNC: 3.5 GM/DL (ref 3.4–5)
ALP BLD-CCNC: 95 IU/L (ref 40–129)
ALT SERPL-CCNC: 28 U/L (ref 10–40)
ANION GAP SERPL CALCULATED.3IONS-SCNC: 14 MMOL/L (ref 4–16)
AST SERPL-CCNC: 26 IU/L (ref 15–37)
BILIRUB SERPL-MCNC: 0.7 MG/DL (ref 0–1)
BILIRUBIN DIRECT: 0.3 MG/DL (ref 0–0.3)
BILIRUBIN, INDIRECT: 0.4 MG/DL (ref 0–0.7)
BUN BLDV-MCNC: 17 MG/DL (ref 6–23)
CALCIUM SERPL-MCNC: 9 MG/DL (ref 8.3–10.6)
CHLORIDE BLD-SCNC: 100 MMOL/L (ref 99–110)
CO2: 27 MMOL/L (ref 21–32)
CREAT SERPL-MCNC: 1 MG/DL (ref 0.9–1.3)
GFR AFRICAN AMERICAN: >60 ML/MIN/1.73M2
GFR NON-AFRICAN AMERICAN: >60 ML/MIN/1.73M2
GLUCOSE BLD-MCNC: 107 MG/DL (ref 70–99)
HCT VFR BLD CALC: 36.3 % (ref 42–52)
HEMOGLOBIN: 11.5 GM/DL (ref 13.5–18)
MCH RBC QN AUTO: 29 PG (ref 27–31)
MCHC RBC AUTO-ENTMCNC: 31.7 % (ref 32–36)
MCV RBC AUTO: 91.7 FL (ref 78–100)
PDW BLD-RTO: 15.3 % (ref 11.7–14.9)
PLATELET # BLD: 480 K/CU MM (ref 140–440)
PMV BLD AUTO: 10.2 FL (ref 7.5–11.1)
POTASSIUM SERPL-SCNC: 3.4 MMOL/L (ref 3.5–5.1)
RBC # BLD: 3.96 M/CU MM (ref 4.6–6.2)
SODIUM BLD-SCNC: 141 MMOL/L (ref 135–145)
TOTAL PROTEIN: 5.8 GM/DL (ref 6.4–8.2)
WBC # BLD: 12.8 K/CU MM (ref 4–10.5)

## 2020-03-22 PROCEDURE — 6360000002 HC RX W HCPCS: Performed by: INTERNAL MEDICINE

## 2020-03-22 PROCEDURE — 97116 GAIT TRAINING THERAPY: CPT

## 2020-03-22 PROCEDURE — 82248 BILIRUBIN DIRECT: CPT

## 2020-03-22 PROCEDURE — 94640 AIRWAY INHALATION TREATMENT: CPT

## 2020-03-22 PROCEDURE — 2580000003 HC RX 258: Performed by: INTERNAL MEDICINE

## 2020-03-22 PROCEDURE — 6370000000 HC RX 637 (ALT 250 FOR IP): Performed by: INTERNAL MEDICINE

## 2020-03-22 PROCEDURE — 85027 COMPLETE CBC AUTOMATED: CPT

## 2020-03-22 PROCEDURE — C9113 INJ PANTOPRAZOLE SODIUM, VIA: HCPCS | Performed by: INTERNAL MEDICINE

## 2020-03-22 PROCEDURE — 36415 COLL VENOUS BLD VENIPUNCTURE: CPT

## 2020-03-22 PROCEDURE — 80053 COMPREHEN METABOLIC PANEL: CPT

## 2020-03-22 PROCEDURE — 2140000000 HC CCU INTERMEDIATE R&B

## 2020-03-22 PROCEDURE — 94761 N-INVAS EAR/PLS OXIMETRY MLT: CPT

## 2020-03-22 PROCEDURE — 97530 THERAPEUTIC ACTIVITIES: CPT

## 2020-03-22 RX ADMIN — LISINOPRIL 10 MG: 10 TABLET ORAL at 08:57

## 2020-03-22 RX ADMIN — THIAMINE HYDROCHLORIDE 100 MG: 100 INJECTION, SOLUTION INTRAMUSCULAR; INTRAVENOUS at 09:05

## 2020-03-22 RX ADMIN — IPRATROPIUM BROMIDE AND ALBUTEROL SULFATE 1 AMPULE: .5; 3 SOLUTION RESPIRATORY (INHALATION) at 11:12

## 2020-03-22 RX ADMIN — SODIUM CHLORIDE, PRESERVATIVE FREE 10 ML: 5 INJECTION INTRAVENOUS at 08:58

## 2020-03-22 RX ADMIN — METOPROLOL SUCCINATE 50 MG: 50 TABLET, EXTENDED RELEASE ORAL at 08:58

## 2020-03-22 RX ADMIN — IPRATROPIUM BROMIDE AND ALBUTEROL SULFATE 1 AMPULE: .5; 3 SOLUTION RESPIRATORY (INHALATION) at 19:57

## 2020-03-22 RX ADMIN — Medication 400 MG: at 08:58

## 2020-03-22 RX ADMIN — Medication 400 MG: at 20:54

## 2020-03-22 RX ADMIN — ENOXAPARIN SODIUM 30 MG: 30 INJECTION SUBCUTANEOUS at 08:58

## 2020-03-22 RX ADMIN — POTASSIUM CHLORIDE 20 MEQ: 1500 TABLET, EXTENDED RELEASE ORAL at 08:57

## 2020-03-22 RX ADMIN — IPRATROPIUM BROMIDE AND ALBUTEROL SULFATE 1 AMPULE: .5; 3 SOLUTION RESPIRATORY (INHALATION) at 07:47

## 2020-03-22 RX ADMIN — ASPIRIN 81 MG 81 MG: 81 TABLET ORAL at 08:57

## 2020-03-22 RX ADMIN — FUROSEMIDE 20 MG: 20 TABLET ORAL at 08:58

## 2020-03-22 RX ADMIN — PANTOPRAZOLE SODIUM 40 MG: 40 INJECTION, POWDER, FOR SOLUTION INTRAVENOUS at 08:58

## 2020-03-22 ASSESSMENT — PAIN SCALES - GENERAL: PAINLEVEL_OUTOF10: 0

## 2020-03-22 NOTE — PROGRESS NOTES
Daily Progress Note     patient is awake alert doing better  Compared to last week  Heart rate is paced   Atrial lead dislodged need reposition -plan for tomorrow  Need to increase activity   Overall improved   Continue medical treatment   Moderate to severe AI-AVR  In future   Keep on current meds      Cath-DICTATED --[id number]  LEFT MAIN PATENT  LAD/RAMUS/LCX AND RCA MILD DX  LVEDP 25-30  NON OBSTRUCTIVE CAD     Ryley--Summary   Left ventricular systolic function is normal.   Ejection fraction is visually estimated at 50-55%.   Mildly dilated left ventricle.   Severe eccentric aortic regurgitation.   Possible fenestration and prolapse of the aortic valve.   Mild to moderate mitral regurgitation is present.  Margarito Acosta was no thrombus noted in the left atrial appendage.   Negative bubble study.  No PFO or ASD noted.       Objective:   BP (!) 141/61   Pulse 73   Temp 98.1 °F (36.7 °C) (Oral)   Resp 15   Ht (S) 5' 10\" (1.778 m)   Wt 199 lb (90.3 kg)   SpO2 94%   BMI 28.55 kg/m²       Intake/Output Summary (Last 24 hours) at 3/22/2020 1028  Last data filed at 3/22/2020 0232  Gross per 24 hour   Intake 240 ml   Output 250 ml   Net -10 ml       Medications:   Scheduled Meds:   metoprolol succinate  50 mg Oral Daily    lisinopril  10 mg Oral Daily    sodium chloride flush  10 mL Intravenous 2 times per day    furosemide  20 mg Oral Daily    potassium chloride  20 mEq Oral Daily    ipratropium-albuterol  1 ampule Inhalation Q4H WA    thiamine (VITAMIN B1) IVPB  100 mg Intravenous Daily    magnesium oxide  400 mg Oral BID    enoxaparin  30 mg Subcutaneous Daily    sodium chloride flush  10 mL Intravenous 2 times per day    sodium chloride flush  10 mL Intravenous BID    sodium chloride flush  10 mL Intravenous 2 times per day    pantoprazole  40 mg Intravenous Daily    aspirin  81 mg Oral Daily      Infusions:   dextrose 100 mL/hr (03/17/20 2014)      PRN Meds:  potassium chloride **OR** potassium

## 2020-03-22 NOTE — PROGRESS NOTES
times per day  sodium chloride flush 0.9 % injection 10 mL, 10 mL, Intravenous, PRN  acetaminophen (TYLENOL) tablet 650 mg, 650 mg, Oral, Q4H PRN  sodium chloride flush 0.9 % injection 10 mL, 10 mL, Intravenous, BID  glucose (GLUTOSE) 40 % oral gel 15 g, 15 g, Oral, PRN  dextrose 50 % IV solution, 12.5 g, Intravenous, PRN  glucagon (rDNA) injection 1 mg, 1 mg, Intramuscular, PRN  dextrose 5 % solution, 100 mL/hr, Intravenous, PRN  sodium chloride flush 0.9 % injection 10 mL, 10 mL, Intravenous, 2 times per day  sodium chloride flush 0.9 % injection 10 mL, 10 mL, Intravenous, PRN  [DISCONTINUED] acetaminophen (TYLENOL) tablet 650 mg, 650 mg, Oral, Q6H PRN **OR** acetaminophen (TYLENOL) suppository 650 mg, 650 mg, Rectal, Q6H PRN  pantoprazole (PROTONIX) injection 40 mg, 40 mg, Intravenous, Daily  aspirin chewable tablet 81 mg, 81 mg, Oral, Daily  Allergies:  Patient has no known allergies. Social History:  TOBACCO:   reports that he has never smoked. His smokeless tobacco use includes snuff. ETOH:   reports current alcohol use. DRUGS:   reports no history of drug use. Family History:       Problem Relation Age of Onset    Coronary Art Dis Mother     Cancer Father         tongue    Diabetes Other         aunt       REVIEW OF SYSTEMS:  CONSTITUTIONAL:  negative  HEENT:  negative  RESPIRATORY:  negative  CARDIOVASCULAR:  negative  GASTROINTESTINAL:  negative  GENITOURINARY:  negative  MUSCULOSKELETAL:  negative  BEHAVIOR/PSYCH:  Negative    ROS unavailable    Family hx neg    PHYSICAL EXAM  ------------------------  Vitals:  BP (!) 149/62   Pulse 70   Temp 98.6 °F (37 °C) (Oral)   Resp 18   Ht (S) 5' 10\" (1.778 m)   Wt 202 lb 9.6 oz (91.9 kg)   SpO2 97%   BMI 29.07 kg/m²      General: drowsy  Well developed, well nourished, well groomed. No apparent distress. HEENT:  Normocephalic, atraumatic. Pupils equal, round, reactive to light. No scleral icterus. No conjunctival injection.   Normal lips, teeth,

## 2020-03-22 NOTE — PROGRESS NOTES
Pt's wife called requesting physician to contact her regarding the scheduled procedure for pt. Mathieu bledsoe sent to Dr. Peewee Huerta.

## 2020-03-22 NOTE — CARE COORDINATION
Called referral to [facility]. with referral.  Leticia Sanchez is unsure if they accept [employer] and will contact their on-call nurse. Electronically signed by MYA Tyler on 3/22/2020 at 2:00 PM     Addendum:  Received return phone call from [facility] and they do not accept [employer].   M for [facility] with potential referral.  Electronically signed by MYA Tyler on 3/22/2020 at 2:11 PM

## 2020-03-22 NOTE — PROGRESS NOTES
consulted. CSF: slightly elevated WBCs. - PRN Ativan and Geodon. Agitation may be due to possible alcohol withdrawal.  EEG: Severe diffuse slowing of the background. MRI brain without contrast: No acute process. - Started Seroquel, PRN Ativan to use in between Geodon. CT head: No acute process. - on versed, fentanyl, ativan prn with CIWA , geodon prn,  - 3/19 extubated, off versed , fentanyl, monitor for improvement   - 3/20 on precedex, improving mental status  - 3/21 weaned off precedex, back to normal      >Severe Anion gap metabolic acidosis-resolved  - Nephrology following. Likely from elevated lactate sec to sepsis. Patient received sodium bicarb. - Acidosis has resolved.     >Acute respiratory failure due to pneumonia and now CHF  -CT chest non-acute, on the vent. pulm consulted. -Pulmonology would like to attempt extubation on patient, however patient is requiring significant sedation due to agitation. Unable to use Precedex due to bradycardia. -CXR: Congestive changes. on  Lasix. - extubated 3/18, on oxygen NC      >Severe aortic regurg  need valve surgery eval once stable. valve surgery once stable      >Alcoholism  - Possible withdrawal.  On Versed , precedex,   - Ativan as needed.      >Acute kidney injury   -Nephrology following. Creatinine appears to be stable.      >Rhabdomyolysis  Possible concern for seizure. On IV fluids. CK improved     >Anemia  Monitor hemoglobin. Stable     >Subconjunctival hemorrhage OS. With chemosis. - was seen by ophthalmology   - Likely related to being on ventilator. No signs of orbital hemorrhage.    - conservative management, expected to resolve on its own.     >Hypomagnesemia  >Hypocalcemia  Started replacement protocol. >Hyperglycemia  -Not diabetic. hemoglobin A1C: normal     Diet DIET CARDIAC; Dietary Nutrition Supplements: Standard High Calorie Oral Supplement   DVT Prophylaxis ? Lovenox   GI Prophylaxis ?  PPI,   Code Status Full Code Disposition  pending cardiology clearance and DC planning, possibly to SNF     History of Present Illness:     Pt S&E. No chest pain, improved dyspnea and cough, no abd pain, no N/V, admit weakness    10-14 point ROS reviewed negative, unless as noted above    Objective: Intake/Output Summary (Last 24 hours) at 3/22/2020 0813  Last data filed at 3/22/2020 0232  Gross per 24 hour   Intake 240 ml   Output 250 ml   Net -10 ml      Vitals:   Vitals:    03/22/20 0748   BP:    Pulse:    Resp:    Temp:    SpO2: 97%     Physical Exam:      GEN Awake male,  cooperative, no apparent distress. RESP Decreased air sounds. Symmetric chest movement . CARDIO/VASC S1/S2 auscultated. Regular rate. GI Abdomen is soft without significant tenderness, Bowel sounds are normoactive. MSK No gross joint deformities. Spontaneous movement of all extremities  SKIN Normal coloration, warm, dry. NEURO normal speech, no lateralizing weakness. PSYCH Awake, alert, oriented x 3.       Medications:   Medications:    metoprolol succinate  50 mg Oral Daily    lisinopril  10 mg Oral Daily    sodium chloride flush  10 mL Intravenous 2 times per day    furosemide  20 mg Oral Daily    potassium chloride  20 mEq Oral Daily    ipratropium-albuterol  1 ampule Inhalation Q4H WA    thiamine (VITAMIN B1) IVPB  100 mg Intravenous Daily    magnesium oxide  400 mg Oral BID    enoxaparin  30 mg Subcutaneous Daily    sodium chloride flush  10 mL Intravenous 2 times per day    sodium chloride flush  10 mL Intravenous BID    sodium chloride flush  10 mL Intravenous 2 times per day    pantoprazole  40 mg Intravenous Daily    aspirin  81 mg Oral Daily      Infusions:    dextrose 100 mL/hr (03/17/20 2014)     PRN Meds: potassium chloride, 40 mEq, PRN    Or  potassium alternative oral replacement, 40 mEq, PRN    Or  potassium chloride, 10 mEq, PRN  sodium chloride flush, 10 mL, PRN  acetaminophen, 650 mg, Q4H PRN  diazePAM, 5 mg, Q6H PRN  ipratropium-albuterol, 1 ampule, Q4H PRN  magnesium sulfate, 1 g, PRN  carboxymethylcellulose, 1 drop, Q2H PRN  sodium chloride flush, 10 mL, PRN  acetaminophen, 650 mg, Q4H PRN  glucose, 15 g, PRN  dextrose, 12.5 g, PRN  glucagon (rDNA), 1 mg, PRN  dextrose, 100 mL/hr, PRN  sodium chloride flush, 10 mL, PRN  acetaminophen, 650 mg, Q6H PRN          Electronically signed by Ml Lyman MD on 3/22/2020 at 8:13 AM

## 2020-03-22 NOTE — PLAN OF CARE
care  3/22/2020 1456 by Jose Alberto Molina RN  Outcome: Ongoing  3/22/2020 0154 by De Juarez RN  Outcome: Ongoing     Problem: Airway Clearance - Ineffective:  Goal: Ability to maintain a clear airway will improve  Description: Ability to maintain a clear airway will improve  3/22/2020 1456 by Jose Alberto Molina RN  Outcome: Ongoing  3/22/2020 0154 by De Juarez RN  Outcome: Ongoing     Problem: Anxiety/Stress:  Goal: Level of anxiety will decrease  Description: Level of anxiety will decrease  3/22/2020 1456 by Jose Albetro Molina RN  Outcome: Ongoing  3/22/2020 0154 by De Juarez RN  Outcome: Ongoing     Problem: Aspiration:  Goal: Absence of aspiration  Description: Absence of aspiration  3/22/2020 1456 by Jose Alberto Molina RN  Outcome: Ongoing  3/22/2020 0154 by De Juarez RN  Outcome: Ongoing     Problem:  Bowel Function - Altered:  Goal: Bowel elimination is within specified parameters  Description: Bowel elimination is within specified parameters  3/22/2020 1456 by Jose Alberto Molina RN  Outcome: Ongoing  3/22/2020 0154 by De Juarez RN  Outcome: Ongoing     Problem: Cardiac Output - Decreased:  Goal: Hemodynamic stability will improve  Description: Hemodynamic stability will improve  3/22/2020 1456 by Jose Alberto Molina RN  Outcome: Ongoing  3/22/2020 0154 by De Juarez RN  Outcome: Ongoing     Problem: Fluid Volume - Imbalance:  Goal: Absence of imbalanced fluid volume signs and symptoms  Description: Absence of imbalanced fluid volume signs and symptoms  3/22/2020 1456 by Jose Alberto Molina RN  Outcome: Ongoing  3/22/2020 0154 by De Juarez RN  Outcome: Ongoing     Problem: Mental Status - Impaired:  Goal: Mental status will be restored to baseline  Description: Mental status will be restored to baseline  3/22/2020 1456 by Jose Alberto Molina RN  Outcome: Ongoing  3/22/2020 0154 by De Juarez RN  Outcome: Ongoing     Problem: Nutrition Deficit:  Goal: Ability to achieve

## 2020-03-22 NOTE — CARE COORDINATION
Pt's wife called CM and states that she spoke with pt and they have decided that he will go home. Discussed HHC with her and she is agreeable. Offered to take pt a HHC list with the Medicare rating list.  She states that she does not care which [place] [address] agency they have. Referral made to  [facility]. She informed CM that [facility]. CM will try other [place] [address] agencies.  TE

## 2020-03-22 NOTE — PROGRESS NOTES
wife who affirms availability for 24 h supervision. Patient's tolerance of treatment:  good   Adverse Reaction: none  Significant change in status and impact:  none  Barriers to improvement:  Cognition, behavioral  Plan for Next Session:    Per POC  Time in:  1033  Time out:  1112  Timed treatment minutes:  39  Total treatment time:  44    Previously filed items:  Social/Functional History  Lives With: Spouse  Type of Home: House  Home Layout: Two level, Bed/Bath upstairs(12 steps with a handrail to access second floor)  Home Access: Stairs to enter with rails  Entrance Stairs - Number of Steps: 3  Bathroom Shower/Tub: Walk-in shower  Bathroom Toilet: Standard  ADL Assistance: Independent  Homemaking Assistance: Independent  Ambulation Assistance: Independent  Transfer Assistance: Independent  Active : Yes  Occupation: Full time employment  Type of occupation:      Long term goals  Long term goal 1: In one week, pt will complete all bed mobility independently  Long term goal 2: In one week, pt will complete sit <> stand transfers independently  Long term goal 3: In one week, pt will ambulate 400 feet with supervision with LRAD  Long term goal 4: In one week, pt will ascend/descend 12 steps with a handrail with SBAx1  Long term goal 5:  In one week, pt will independently complete 3 sets of 10 reps of BLE AROM exercises in available and allowed ROM    Electronically signed by:    Bay Mancera PT  3/22/2020, 12:55 PM

## 2020-03-22 NOTE — CARE COORDINATION
Met with pt to discuss the PT/OT recommendation for SNF. Pt is agreeable. SNF list and Medicare rating list provided. Pt wants to discuss with his wife this evening and will have a decision in the am.  CM to f/u in the am for his SNF choices. Pt has [employer] and will require a pre-cert.   TE

## 2020-03-22 NOTE — PLAN OF CARE
Problem: Falls - Risk of:  Goal: Will remain free from falls  Description: Will remain free from falls  Outcome: Ongoing  Goal: Absence of physical injury  Description: Absence of physical injury  Outcome: Ongoing     Problem: Restraint Use - Nonviolent/Non-Self-Destructive Behavior:  Goal: Absence of restraint indications  Description: Absence of restraint indications  Outcome: Ongoing  Goal: Absence of restraint-related injury  Description: Absence of restraint-related injury  Outcome: Ongoing     Problem: Nutrition  Goal: Optimal nutrition therapy  Outcome: Ongoing     Problem: Pain:  Goal: Pain level will decrease  Description: Pain level will decrease  Outcome: Ongoing  Goal: Control of acute pain  Description: Control of acute pain  Outcome: Ongoing  Goal: Control of chronic pain  Description: Control of chronic pain  Outcome: Ongoing     Problem: Discharge Planning:  Goal: Participates in care planning  Description: Participates in care planning  Outcome: Ongoing  Goal: Discharged to appropriate level of care  Description: Discharged to appropriate level of care  Outcome: Ongoing     Problem: Airway Clearance - Ineffective:  Goal: Ability to maintain a clear airway will improve  Description: Ability to maintain a clear airway will improve  Outcome: Ongoing     Problem: Anxiety/Stress:  Goal: Level of anxiety will decrease  Description: Level of anxiety will decrease  Outcome: Ongoing     Problem: Aspiration:  Goal: Absence of aspiration  Description: Absence of aspiration  Outcome: Ongoing     Problem:  Bowel Function - Altered:  Goal: Bowel elimination is within specified parameters  Description: Bowel elimination is within specified parameters  Outcome: Ongoing     Problem: Mental Status - Impaired:  Goal: Mental status will be restored to baseline  Description: Mental status will be restored to baseline  Outcome: Ongoing     Problem: Nutrition Deficit:  Goal: Ability to achieve adequate nutritional intake will improve  Description: Ability to achieve adequate nutritional intake will improve  Outcome: Ongoing     Problem: Sleep Pattern Disturbance:  Goal: Appears well-rested  Description: Appears well-rested  Outcome: Ongoing     Problem: Tissue Perfusion - Cardiopulmonary, Altered:  Goal: Absence of angina  Description: Absence of angina  Outcome: Ongoing  Goal: Hemodynamic stability will improve  Description: Hemodynamic stability will improve  Outcome: Ongoing

## 2020-03-23 ENCOUNTER — ANESTHESIA EVENT (OUTPATIENT)
Dept: OPERATING ROOM | Age: 55
DRG: 853 | End: 2020-03-23
Payer: COMMERCIAL

## 2020-03-23 ENCOUNTER — APPOINTMENT (OUTPATIENT)
Dept: GENERAL RADIOLOGY | Age: 55
DRG: 853 | End: 2020-03-23
Payer: COMMERCIAL

## 2020-03-23 ENCOUNTER — ANESTHESIA (OUTPATIENT)
Dept: OPERATING ROOM | Age: 55
DRG: 853 | End: 2020-03-23
Payer: COMMERCIAL

## 2020-03-23 VITALS
RESPIRATION RATE: 16 BRPM | DIASTOLIC BLOOD PRESSURE: 67 MMHG | SYSTOLIC BLOOD PRESSURE: 135 MMHG | OXYGEN SATURATION: 100 %

## 2020-03-23 LAB
ALBUMIN SERPL-MCNC: 3.3 GM/DL (ref 3.4–5)
ALP BLD-CCNC: 86 IU/L (ref 40–129)
ALT SERPL-CCNC: 39 U/L (ref 10–40)
ANION GAP SERPL CALCULATED.3IONS-SCNC: 14 MMOL/L (ref 4–16)
AST SERPL-CCNC: 46 IU/L (ref 15–37)
BILIRUB SERPL-MCNC: 0.5 MG/DL (ref 0–1)
BILIRUBIN DIRECT: 0.2 MG/DL (ref 0–0.3)
BILIRUBIN, INDIRECT: 0.3 MG/DL (ref 0–0.7)
BUN BLDV-MCNC: 18 MG/DL (ref 6–23)
CALCIUM SERPL-MCNC: 8.5 MG/DL (ref 8.3–10.6)
CHLORIDE BLD-SCNC: 101 MMOL/L (ref 99–110)
CO2: 25 MMOL/L (ref 21–32)
CREAT SERPL-MCNC: 1 MG/DL (ref 0.9–1.3)
GFR AFRICAN AMERICAN: >60 ML/MIN/1.73M2
GFR NON-AFRICAN AMERICAN: >60 ML/MIN/1.73M2
GLUCOSE BLD-MCNC: 100 MG/DL (ref 70–99)
HCT VFR BLD CALC: 35.6 % (ref 42–52)
HEMOGLOBIN: 11 GM/DL (ref 13.5–18)
MCH RBC QN AUTO: 29 PG (ref 27–31)
MCHC RBC AUTO-ENTMCNC: 30.9 % (ref 32–36)
MCV RBC AUTO: 93.9 FL (ref 78–100)
PDW BLD-RTO: 15.3 % (ref 11.7–14.9)
PLATELET # BLD: 446 K/CU MM (ref 140–440)
PMV BLD AUTO: 10.1 FL (ref 7.5–11.1)
POTASSIUM SERPL-SCNC: 3.3 MMOL/L (ref 3.5–5.1)
POTASSIUM SERPL-SCNC: 3.4 MMOL/L (ref 3.5–5.1)
PROCALCITONIN: 0.17
RBC # BLD: 3.79 M/CU MM (ref 4.6–6.2)
SODIUM BLD-SCNC: 140 MMOL/L (ref 135–145)
TOTAL PROTEIN: 5.7 GM/DL (ref 6.4–8.2)
WBC # BLD: 11.2 K/CU MM (ref 4–10.5)

## 2020-03-23 PROCEDURE — 2720000010 HC SURG SUPPLY STERILE: Performed by: SURGERY

## 2020-03-23 PROCEDURE — 85027 COMPLETE CBC AUTOMATED: CPT

## 2020-03-23 PROCEDURE — 6360000002 HC RX W HCPCS: Performed by: SURGERY

## 2020-03-23 PROCEDURE — 6370000000 HC RX 637 (ALT 250 FOR IP): Performed by: INTERNAL MEDICINE

## 2020-03-23 PROCEDURE — 84145 PROCALCITONIN (PCT): CPT

## 2020-03-23 PROCEDURE — 2580000003 HC RX 258: Performed by: INTERNAL MEDICINE

## 2020-03-23 PROCEDURE — 0JH606Z INSERTION OF PACEMAKER, DUAL CHAMBER INTO CHEST SUBCUTANEOUS TISSUE AND FASCIA, OPEN APPROACH: ICD-10-PCS | Performed by: SURGERY

## 2020-03-23 PROCEDURE — 94640 AIRWAY INHALATION TREATMENT: CPT

## 2020-03-23 PROCEDURE — 71045 X-RAY EXAM CHEST 1 VIEW: CPT

## 2020-03-23 PROCEDURE — 94761 N-INVAS EAR/PLS OXIMETRY MLT: CPT

## 2020-03-23 PROCEDURE — 2580000003 HC RX 258

## 2020-03-23 PROCEDURE — 6360000002 HC RX W HCPCS: Performed by: NURSE ANESTHETIST, CERTIFIED REGISTERED

## 2020-03-23 PROCEDURE — 2580000003 HC RX 258: Performed by: NURSE ANESTHETIST, CERTIFIED REGISTERED

## 2020-03-23 PROCEDURE — 76000 FLUOROSCOPY <1 HR PHYS/QHP: CPT

## 2020-03-23 PROCEDURE — C9113 INJ PANTOPRAZOLE SODIUM, VIA: HCPCS | Performed by: INTERNAL MEDICINE

## 2020-03-23 PROCEDURE — 02PA3MZ REMOVAL OF CARDIAC LEAD FROM HEART, PERCUTANEOUS APPROACH: ICD-10-PCS | Performed by: SURGERY

## 2020-03-23 PROCEDURE — 80053 COMPREHEN METABOLIC PANEL: CPT

## 2020-03-23 PROCEDURE — C1898 LEAD, PMKR, OTHER THAN TRANS: HCPCS | Performed by: SURGERY

## 2020-03-23 PROCEDURE — 6360000002 HC RX W HCPCS: Performed by: INTERNAL MEDICINE

## 2020-03-23 PROCEDURE — 84132 ASSAY OF SERUM POTASSIUM: CPT

## 2020-03-23 PROCEDURE — 3E0102A INTRODUCTION OF ANTI-INFECTIVE ENVELOPE INTO SUBCUTANEOUS TISSUE, OPEN APPROACH: ICD-10-PCS | Performed by: SURGERY

## 2020-03-23 PROCEDURE — C1894 INTRO/SHEATH, NON-LASER: HCPCS | Performed by: SURGERY

## 2020-03-23 PROCEDURE — 02H63JZ INSERTION OF PACEMAKER LEAD INTO RIGHT ATRIUM, PERCUTANEOUS APPROACH: ICD-10-PCS | Performed by: SURGERY

## 2020-03-23 PROCEDURE — 2580000003 HC RX 258: Performed by: SURGERY

## 2020-03-23 PROCEDURE — 2140000000 HC CCU INTERMEDIATE R&B

## 2020-03-23 PROCEDURE — 2780000010 HC IMPLANT OTHER: Performed by: SURGERY

## 2020-03-23 PROCEDURE — 82248 BILIRUBIN DIRECT: CPT

## 2020-03-23 PROCEDURE — 3700000000 HC ANESTHESIA ATTENDED CARE: Performed by: SURGERY

## 2020-03-23 PROCEDURE — 0JPT0PZ REMOVAL OF CARDIAC RHYTHM RELATED DEVICE FROM TRUNK SUBCUTANEOUS TISSUE AND FASCIA, OPEN APPROACH: ICD-10-PCS | Performed by: SURGERY

## 2020-03-23 PROCEDURE — 3600000003 HC SURGERY LEVEL 3 BASE: Performed by: SURGERY

## 2020-03-23 PROCEDURE — 2500000003 HC RX 250 WO HCPCS: Performed by: SURGERY

## 2020-03-23 PROCEDURE — 3700000001 HC ADD 15 MINUTES (ANESTHESIA): Performed by: SURGERY

## 2020-03-23 PROCEDURE — 92526 ORAL FUNCTION THERAPY: CPT

## 2020-03-23 PROCEDURE — 3600000013 HC SURGERY LEVEL 3 ADDTL 15MIN: Performed by: SURGERY

## 2020-03-23 PROCEDURE — 2709999900 HC NON-CHARGEABLE SUPPLY: Performed by: SURGERY

## 2020-03-23 PROCEDURE — 99232 SBSQ HOSP IP/OBS MODERATE 35: CPT | Performed by: NURSE PRACTITIONER

## 2020-03-23 DEVICE — Z DUP USE 2281943 LEAD PACE 6FR L53CM PLAT ALLOY/POROUS TI NITRIDE PERM RT ATR: Type: IMPLANTABLE DEVICE | Site: CHEST | Status: FUNCTIONAL

## 2020-03-23 DEVICE — ENVELOPE PACEMKR L W2.9XL3.3IN ABSRB ANTIBACT TYRX: Type: IMPLANTABLE DEVICE | Site: CHEST | Status: FUNCTIONAL

## 2020-03-23 RX ORDER — SODIUM CHLORIDE, SODIUM LACTATE, POTASSIUM CHLORIDE, CALCIUM CHLORIDE 600; 310; 30; 20 MG/100ML; MG/100ML; MG/100ML; MG/100ML
INJECTION, SOLUTION INTRAVENOUS CONTINUOUS PRN
Status: DISCONTINUED | OUTPATIENT
Start: 2020-03-23 | End: 2020-03-23 | Stop reason: SDUPTHER

## 2020-03-23 RX ORDER — FENTANYL CITRATE 50 UG/ML
INJECTION, SOLUTION INTRAMUSCULAR; INTRAVENOUS PRN
Status: DISCONTINUED | OUTPATIENT
Start: 2020-03-23 | End: 2020-03-23 | Stop reason: SDUPTHER

## 2020-03-23 RX ORDER — LIDOCAINE HYDROCHLORIDE 20 MG/ML
INJECTION, SOLUTION INTRAVENOUS PRN
Status: DISCONTINUED | OUTPATIENT
Start: 2020-03-23 | End: 2020-03-23 | Stop reason: SDUPTHER

## 2020-03-23 RX ORDER — CEFAZOLIN SODIUM 2 G/50ML
SOLUTION INTRAVENOUS PRN
Status: DISCONTINUED | OUTPATIENT
Start: 2020-03-23 | End: 2020-03-23 | Stop reason: SDUPTHER

## 2020-03-23 RX ORDER — LIDOCAINE HYDROCHLORIDE 10 MG/ML
INJECTION, SOLUTION INFILTRATION; PERINEURAL
Status: COMPLETED | OUTPATIENT
Start: 2020-03-23 | End: 2020-03-23

## 2020-03-23 RX ORDER — MIDAZOLAM HYDROCHLORIDE 1 MG/ML
INJECTION INTRAMUSCULAR; INTRAVENOUS PRN
Status: DISCONTINUED | OUTPATIENT
Start: 2020-03-23 | End: 2020-03-23 | Stop reason: SDUPTHER

## 2020-03-23 RX ORDER — SODIUM CHLORIDE, SODIUM LACTATE, POTASSIUM CHLORIDE, CALCIUM CHLORIDE 600; 310; 30; 20 MG/100ML; MG/100ML; MG/100ML; MG/100ML
INJECTION, SOLUTION INTRAVENOUS ONCE
Status: COMPLETED | OUTPATIENT
Start: 2020-03-23 | End: 2020-03-23

## 2020-03-23 RX ORDER — PROPOFOL 10 MG/ML
INJECTION, EMULSION INTRAVENOUS CONTINUOUS PRN
Status: DISCONTINUED | OUTPATIENT
Start: 2020-03-23 | End: 2020-03-23 | Stop reason: SDUPTHER

## 2020-03-23 RX ORDER — SODIUM CHLORIDE, SODIUM LACTATE, POTASSIUM CHLORIDE, CALCIUM CHLORIDE 600; 310; 30; 20 MG/100ML; MG/100ML; MG/100ML; MG/100ML
INJECTION, SOLUTION INTRAVENOUS
Status: COMPLETED
Start: 2020-03-23 | End: 2020-03-23

## 2020-03-23 RX ADMIN — SODIUM CHLORIDE, POTASSIUM CHLORIDE, SODIUM LACTATE AND CALCIUM CHLORIDE: 600; 310; 30; 20 INJECTION, SOLUTION INTRAVENOUS at 07:18

## 2020-03-23 RX ADMIN — Medication 400 MG: at 09:10

## 2020-03-23 RX ADMIN — Medication 400 MG: at 20:55

## 2020-03-23 RX ADMIN — ENOXAPARIN SODIUM 30 MG: 30 INJECTION SUBCUTANEOUS at 09:10

## 2020-03-23 RX ADMIN — IPRATROPIUM BROMIDE AND ALBUTEROL SULFATE 1 AMPULE: .5; 3 SOLUTION RESPIRATORY (INHALATION) at 21:05

## 2020-03-23 RX ADMIN — ASPIRIN 81 MG 81 MG: 81 TABLET ORAL at 09:09

## 2020-03-23 RX ADMIN — PROPOFOL 100 MCG/KG/MIN: 10 INJECTION, EMULSION INTRAVENOUS at 08:02

## 2020-03-23 RX ADMIN — METOPROLOL SUCCINATE 50 MG: 50 TABLET, EXTENDED RELEASE ORAL at 09:09

## 2020-03-23 RX ADMIN — MIDAZOLAM 2 MG: 1 INJECTION INTRAMUSCULAR; INTRAVENOUS at 07:44

## 2020-03-23 RX ADMIN — THIAMINE HYDROCHLORIDE 100 MG: 100 INJECTION, SOLUTION INTRAMUSCULAR; INTRAVENOUS at 11:08

## 2020-03-23 RX ADMIN — SODIUM CHLORIDE, POTASSIUM CHLORIDE, SODIUM LACTATE AND CALCIUM CHLORIDE: 600; 310; 30; 20 INJECTION, SOLUTION INTRAVENOUS at 08:35

## 2020-03-23 RX ADMIN — SODIUM CHLORIDE, SODIUM LACTATE, POTASSIUM CHLORIDE, CALCIUM CHLORIDE: 600; 310; 30; 20 INJECTION, SOLUTION INTRAVENOUS at 07:18

## 2020-03-23 RX ADMIN — CEFAZOLIN SODIUM 2 G: 2 SOLUTION INTRAVENOUS at 08:35

## 2020-03-23 RX ADMIN — IPRATROPIUM BROMIDE AND ALBUTEROL SULFATE 1 AMPULE: .5; 3 SOLUTION RESPIRATORY (INHALATION) at 12:16

## 2020-03-23 RX ADMIN — IPRATROPIUM BROMIDE AND ALBUTEROL SULFATE 1 AMPULE: .5; 3 SOLUTION RESPIRATORY (INHALATION) at 15:49

## 2020-03-23 RX ADMIN — SODIUM CHLORIDE, PRESERVATIVE FREE 10 ML: 5 INJECTION INTRAVENOUS at 20:55

## 2020-03-23 RX ADMIN — LISINOPRIL 10 MG: 10 TABLET ORAL at 09:09

## 2020-03-23 RX ADMIN — POTASSIUM CHLORIDE 20 MEQ: 1500 TABLET, EXTENDED RELEASE ORAL at 09:09

## 2020-03-23 RX ADMIN — FUROSEMIDE 20 MG: 20 TABLET ORAL at 09:10

## 2020-03-23 RX ADMIN — FENTANYL CITRATE 100 MCG: 50 INJECTION INTRAMUSCULAR; INTRAVENOUS at 07:53

## 2020-03-23 RX ADMIN — LIDOCAINE HYDROCHLORIDE 20 MG: 20 INJECTION, SOLUTION INTRAVENOUS at 08:02

## 2020-03-23 RX ADMIN — SODIUM CHLORIDE, PRESERVATIVE FREE 10 ML: 5 INJECTION INTRAVENOUS at 09:10

## 2020-03-23 RX ADMIN — PANTOPRAZOLE SODIUM 40 MG: 40 INJECTION, POWDER, FOR SOLUTION INTRAVENOUS at 09:10

## 2020-03-23 ASSESSMENT — PULMONARY FUNCTION TESTS
PIF_VALUE: 1
PIF_VALUE: 0
PIF_VALUE: 1
PIF_VALUE: 0
PIF_VALUE: 1
PIF_VALUE: 0
PIF_VALUE: 1

## 2020-03-23 ASSESSMENT — PAIN SCALES - GENERAL
PAINLEVEL_OUTOF10: 0
PAINLEVEL_OUTOF10: 0

## 2020-03-23 ASSESSMENT — LIFESTYLE VARIABLES: SMOKING_STATUS: 0

## 2020-03-23 NOTE — ANESTHESIA PRE PROCEDURE
03/20/20 0856    sodium chloride flush 0.9 % injection 10 mL  10 mL Intravenous PRN Rachna Baca MD        acetaminophen (TYLENOL) suppository 650 mg  650 mg Rectal Q6H PRN Rachna Baca MD   650 mg at 03/09/20 1951    pantoprazole (PROTONIX) injection 40 mg  40 mg Intravenous Daily Rachna Baca MD   40 mg at 03/22/20 7883    aspirin chewable tablet 81 mg  81 mg Oral Daily Rachna Baca MD   81 mg at 03/22/20 0857       Allergies:  No Known Allergies    Problem List:    Patient Active Problem List   Diagnosis Code    Anxiety F41.9    Sepsis ([facility] [place] [address] G08.6    Metabolic encephalopathy J64.95    Delirium R41.0    Hyperammonemia ([facility] [place] [address] E72.20    Hyperglycemia R73.9    Diverticulitis of colon K57.32    Acute respiratory failure with hypoxia ([address] J96.01       Past Medical History:        Diagnosis Date    Anxiety        Past Surgical History:  No past surgical history on file. Social History:    Social History     Tobacco Use    Smoking status: Never Smoker    Smokeless tobacco: Current User     Types: Snuff   Substance Use Topics    Alcohol use: Yes     Comment: DAILY                                Ready to quit: Not Answered  Counseling given: Not Answered      Vital Signs (Current): There were no vitals filed for this visit.                                            BP Readings from Last 3 Encounters:   03/23/20 127/69   03/20/20 117/61   01/07/20 136/64       NPO Status:                                                                                 BMI:   Wt Readings from Last 3 Encounters:   03/23/20 195 lb 11.2 oz (88.8 kg)   01/07/20 217 lb (98.4 kg)   09/12/19 211 lb 9.6 oz (96 kg)     There is no height or weight on file to calculate BMI.    CBC:   Lab Results   Component Value Date    WBC 11.2 03/23/2020    RBC 3.79 03/23/2020    HGB 11.0 03/23/2020    HCT 35.6 03/23/2020    MCV 93.9 03/23/2020    RDW 15.3 03/23/2020     03/23/2020       CMP:   Lab Results   Component Value Date    NA Vascular:                                        Anesthesia Plan      general     ASA 4     (Patient high risk for cardiac arrest due to recent history of severe bradycardic events.)  Induction: intravenous. Anesthetic plan and risks discussed with patient. Plan discussed with CRNA and attending.     Attending anesthesiologist reviewed and agrees with Pre Eval content              Hermelindo Kellogg MD   3/23/2020

## 2020-03-23 NOTE — PROGRESS NOTES
Barbara Eisenberg MD.  Section of General Neurology - Adult  Consult Note        Reason for Consult:    Requesting Physician:  No referring provider defined for this encounter. Thank you for your kind referral.    Pt is doing better without any confusion    Pt s confusion is doing a lot better     Past Medical History:        Diagnosis Date    Anxiety      Past Surgical History:    No past surgical history on file.   Current Medications:   Current Facility-Administered Medications: metoprolol succinate (TOPROL XL) extended release tablet 50 mg, 50 mg, Oral, Daily  lisinopril (PRINIVIL;ZESTRIL) tablet 10 mg, 10 mg, Oral, Daily  potassium chloride (KLOR-CON M) extended release tablet 40 mEq, 40 mEq, Oral, PRN **OR** potassium bicarb-citric acid (EFFER-K) effervescent tablet 40 mEq, 40 mEq, Oral, PRN **OR** potassium chloride 10 mEq/100 mL IVPB (Peripheral Line), 10 mEq, Intravenous, PRN  sodium chloride flush 0.9 % injection 10 mL, 10 mL, Intravenous, 2 times per day  sodium chloride flush 0.9 % injection 10 mL, 10 mL, Intravenous, PRN  acetaminophen (TYLENOL) tablet 650 mg, 650 mg, Oral, Q4H PRN  furosemide (LASIX) tablet 20 mg, 20 mg, Oral, Daily  potassium chloride (KLOR-CON M) extended release tablet 20 mEq, 20 mEq, Oral, Daily  diazePAM (VALIUM) tablet 5 mg, 5 mg, Oral, Q6H PRN  ipratropium-albuterol (DUONEB) nebulizer solution 1 ampule, 1 ampule, Inhalation, Q4H WA  thiamine (B-1) 100 mg in sodium chloride 0.9 % 50 mL IVPB, 100 mg, Intravenous, Daily  ipratropium-albuterol (DUONEB) nebulizer solution 1 ampule, 1 ampule, Inhalation, Q4H PRN  magnesium oxide (MAG-OX) tablet 400 mg, 400 mg, Oral, BID  magnesium sulfate 1 g in dextrose 5% 100 mL IVPB, 1 g, Intravenous, PRN  enoxaparin (LOVENOX) injection 30 mg, 30 mg, Subcutaneous, Daily  carboxymethylcellulose (THERATEARS) 1 % ophthalmic gel 1 drop, 1 drop, Both Eyes, Q2H PRN  sodium chloride flush 0.9 % injection 10 mL, 10 mL, Intravenous, 2 times per day  sodium discharge. Neck:  Supple  Heart:  RRR, no murmurs, gallops, rubs  Lungs:  CTA bilaterally, bilat symmetrical expansion, no wheeze, rales, or rhonchi  Abdomen:   Bowel sounds present, soft, nontender, no masses, no organomegaly, no peritoneal signs  Extremities:  No clubbing, cyanosis, or edema  Skin:  Warm and dry, no open lesions or rash  Breast: deferred  Rectal: deferred  Genitalia:  deferred    NEUROLOGICAL EXAM  ---------------------------------    Mental Status Exam:                Not Confused  follows commands  EOMI pupils 3 mm russell  5/5 russell UE LE  resp on own              CBC with Differential:    Lab Results   Component Value Date    WBC 12.8 03/22/2020    RBC 3.96 03/22/2020    HGB 11.5 03/22/2020    HCT 36.3 03/22/2020     03/22/2020    MCV 91.7 03/22/2020    MCH 29.0 03/22/2020    MCHC 31.7 03/22/2020    RDW 15.3 03/22/2020    SEGSPCT 68.0 03/15/2020    BANDSPCT 12 03/15/2020    LYMPHOPCT 11.0 03/15/2020    MONOPCT 9.0 03/15/2020    BASOPCT 1.1 03/14/2020    MONOSABS 0.7 03/15/2020    LYMPHSABS 0.8 03/15/2020    EOSABS 0.2 03/14/2020    BASOSABS 0.1 03/14/2020    DIFFTYPE MANUAL DIFFERENTIAL 03/15/2020     CMP:    Lab Results   Component Value Date     03/22/2020    K 3.4 03/22/2020     03/22/2020    CO2 27 03/22/2020    BUN 17 03/22/2020    CREATININE 1.0 03/22/2020    GFRAA >60 03/22/2020    AGRATIO 1.7 09/12/2019    LABGLOM >60 03/22/2020    GLUCOSE 107 03/22/2020    PROT 5.8 03/22/2020    LABALBU 3.5 03/22/2020    CALCIUM 9.0 03/22/2020    BILITOT 0.7 03/22/2020    ALKPHOS 95 03/22/2020    AST 26 03/22/2020    ALT 28 03/22/2020     BMP:    Lab Results   Component Value Date     03/22/2020    K 3.4 03/22/2020     03/22/2020    CO2 27 03/22/2020    BUN 17 03/22/2020    LABALBU 3.5 03/22/2020    CREATININE 1.0 03/22/2020    CALCIUM 9.0 03/22/2020    GFRAA >60 03/22/2020    LABGLOM >60 03/22/2020    GLUCOSE 107 03/22/2020     PT/INR:    Lab Results   Component Value Date

## 2020-03-23 NOTE — PROGRESS NOTES
Pulmonary and Critical Care  Progress Note    Subjective: The patient is doing well. PPM placed. Shortness of breath none. Chest pain none. Addressing respiratory complaints Patient is negative for  hemoptysis and cyanosis. CONSTITUTIONAL:  negative for fevers and chills. Past Medical History:     has a past medical history of Anxiety. has a past surgical history that includes Pacemaker insertion (2020) and Pacemaker insertion (N/A, 3/20/2020). reports that he has never smoked. His smokeless tobacco use includes snuff. He reports current alcohol use. He reports that he does not use drugs. Family history:  family history includes Cancer in his father; Coronary Art Dis in his mother; Diabetes in an other family member. No Known Allergies  Social History:    Reviewed; no changes    Objective:   PHYSICAL EXAM:        VITALS:  /67   Pulse 73   Temp 98.5 °F (36.9 °C) (Axillary)   Resp 13   Ht (S) 5' 10\" (1.778 m)   Wt 195 lb 11.2 oz (88.8 kg)   SpO2 96%   BMI 28.08 kg/m²     24HR INTAKE/OUTPUT:      Intake/Output Summary (Last 24 hours) at 3/23/2020 1116  Last data filed at 3/23/2020 0600  Gross per 24 hour   Intake 240 ml   Output 825 ml   Net -585 ml       CONSTITUTIONAL: Alert and oriented. LUNGS:  decreased breath sounds, occ basilar crackles. CARDIOVASCULAR:  normal S1 and S2 and negative JVD  ABD:Abdomen soft, non-tender. BS normal. No masses,  No organomegaly  NEURO:Sedated on vent.   DATA:    CBC:  Recent Labs     03/21/20  0530 03/22/20  0507 03/23/20  0212   WBC 14.5* 12.8* 11.2*   RBC 3.78* 3.96* 3.79*   HGB 10.9* 11.5* 11.0*   HCT 35.6* 36.3* 35.6*   * 480* 446*   MCV 94.2 91.7 93.9   MCH 28.8 29.0 29.0   MCHC 30.6* 31.7* 30.9*   RDW 15.3* 15.3* 15.3*      BMP:  Recent Labs     03/21/20  0530 03/21/20  1831 03/22/20  0507 03/23/20  0212     --  141 140   K 3.4* 3.5 3.4* 3.4*   CL 99  --  100 101   CO2 30  --  27 25   BUN 21  --  17 18   CREATININE 1.1  --  1.0 1.0

## 2020-03-23 NOTE — PROGRESS NOTES
Neuro consulted. CSF: slightly elevated WBCs. - PRN Ativan and Geodon. Agitation may be due to possible alcohol withdrawal.  EEG: Severe diffuse slowing of the background. MRI brain without contrast: No acute process. - Started Seroquel, PRN Ativan to use in between Geodon. CT head: No acute process. - on versed, fentanyl, ativan prn with CIWA , geodon prn,  - 3/19 extubated, off versed , fentanyl, monitor for improvement   - 3/20 on precedex, improving mental status  - 3/21 weaned off precedex, back to normal      >Severe Anion gap metabolic acidosis-resolved  - Nephrology following. Likely from elevated lactate sec to sepsis. Patient received sodium bicarb. - Acidosis has resolved.     >Acute respiratory failure due to pneumonia and now CHF  -CT chest non-acute, on the vent. pulm consulted. -Pulmonology would like to attempt extubation on patient, however patient is requiring significant sedation due to agitation. Unable to use Precedex due to bradycardia. -CXR: Congestive changes. on  Lasix. - extubated 3/18, on oxygen NC      >Severe aortic regurg  need valve surgery eval once stable. valve surgery once stable      >Alcoholism  - Possible withdrawal.  On Versed , precedex,   - Ativan as needed.      >Acute kidney injury   -Nephrology following. Creatinine appears to be stable.      >Rhabdomyolysis  Possible concern for seizure. On IV fluids. CK improved     >Anemia  Monitor hemoglobin. Stable     >Subconjunctival hemorrhage OS. With chemosis. - was seen by ophthalmology   - Likely related to being on ventilator. No signs of orbital hemorrhage.    - conservative management, expected to resolve on its own.     >Hypomagnesemia  >Hypocalcemia  Started replacement protocol. >Hyperglycemia  -Not diabetic. hemoglobin A1C: normal     Diet Dietary Nutrition Supplements: Standard High Calorie Oral Supplement  DIET CARDIAC;   DVT Prophylaxis ? Lovenox   GI Prophylaxis ?  PPI,   Code Status Full Q4H PRN  diazePAM, 5 mg, Q6H PRN  ipratropium-albuterol, 1 ampule, Q4H PRN  magnesium sulfate, 1 g, PRN  carboxymethylcellulose, 1 drop, Q2H PRN  sodium chloride flush, 10 mL, PRN  acetaminophen, 650 mg, Q4H PRN  glucose, 15 g, PRN  dextrose, 12.5 g, PRN  glucagon (rDNA), 1 mg, PRN  dextrose, 100 mL/hr, PRN  sodium chloride flush, 10 mL, PRN  acetaminophen, 650 mg, Q6H PRN          Electronically signed by Lucyann Cheadle, MD on 3/23/2020 at 9:18 AM

## 2020-03-23 NOTE — PROGRESS NOTES
Daily Progress Note    S/p atrial lead repositioned today  2:1 block--with a PPM placed  AI surgery once stable  Home tomorrow if stable  Keep on current meds  HTN stable      Pt. Awake, alert and feeling ok, seen after lead repositioning  HR stable, Paced, BP stable     Sepsis    Secondary to acute diverticulitis, PNA    resolved now- off ABx, vent    Stable overall     2:1 AV block    s/p PPM- Atrial lead needed repositioning today  [address] done-mild disease noted    Did have episode of VF for 30 seconds-d/t bradycardia    Will check CXR today, PPM check    Aortic Insufficiency     GINGER done for AI-Severe- may need surgery for this when stable, can address OP    Stable for now       Increase activity as able  Will cont. To follow       GINGER-3/9/20   Summary   Left ventricular systolic function is normal.   Ejection fraction is visually estimated at 50-55%.   Mildly dilated left ventricle.   Severe eccentric aortic regurgitation.   Possible fenestration and prolapse of the aortic valve.   Mild to moderate mitral regurgitation is present.  Margarito Acosta was no thrombus noted in the left atrial appendage.   Negative bubble study. No PFO or ASD noted.     [facility]-3/9/20  IMPRESSION:  1.  EDP is around between 25 and 30 mmHg present. 2.  Left main is patent. 3.  LAD, circ, ramus, and RCA all have mild coronary artery disease  present.     PAST MEDICAL HISTORY:  None.     PAST SURGICAL HISTORY:  None.     SOCIAL HISTORY:  He smokes and drinks.  Also there is history of having  drug abuse present and possibly some abuse still present.     ALLERGIES:  NKDA.       Objective:   /67   Pulse 73   Temp 98.5 °F (36.9 °C) (Axillary)   Resp 13   Ht (S) 5' 10\" (1.778 m)   Wt 195 lb 11.2 oz (88.8 kg)   SpO2 96%   BMI 28.08 kg/m²       Intake/Output Summary (Last 24 hours) at 3/23/2020 0933  Last data filed at 3/23/2020 0600  Gross per 24 hour   Intake 240 ml   Output 825 ml   Net -585 ml       Medications:   Scheduled Meds:   metoprolol succinate  50 mg Oral Daily    lisinopril  10 mg Oral Daily    sodium chloride flush  10 mL Intravenous 2 times per day    furosemide  20 mg Oral Daily    potassium chloride  20 mEq Oral Daily    ipratropium-albuterol  1 ampule Inhalation Q4H WA    thiamine (VITAMIN B1) IVPB  100 mg Intravenous Daily    magnesium oxide  400 mg Oral BID    enoxaparin  30 mg Subcutaneous Daily    sodium chloride flush  10 mL Intravenous 2 times per day    sodium chloride flush  10 mL Intravenous BID    sodium chloride flush  10 mL Intravenous 2 times per day    pantoprazole  40 mg Intravenous Daily    aspirin  81 mg Oral Daily      Infusions:   dextrose 100 mL/hr (03/17/20 2014)      PRN Meds:  potassium chloride **OR** potassium alternative oral replacement **OR** potassium chloride, sodium chloride flush, acetaminophen, diazePAM, ipratropium-albuterol, magnesium sulfate, carboxymethylcellulose, sodium chloride flush, acetaminophen, glucose, dextrose, glucagon (rDNA), dextrose, sodium chloride flush, [DISCONTINUED] acetaminophen **OR** acetaminophen       Physical Exam:  Vitals:    03/23/20 0855   BP: 128/67   Pulse: 73   Resp: 13   Temp: 98.5 °F (36.9 °C)   SpO2: 96%        General: AAO, NAD  Chest: Nontender  Cardiac: First and Second Heart Sounds are Normal, No Murmurs or Gallops noted  Lungs:Clear to auscultation and percussion. Abdomen: Soft, NT, ND, +BS  Extremities: No clubbing, no edema  Vascular:  Equal 2+ peripheral pulses.         Lab Data:  CBC:   Recent Labs     03/21/20  0530 03/22/20  0507 03/23/20  0212   WBC 14.5* 12.8* 11.2*   HGB 10.9* 11.5* 11.0*   HCT 35.6* 36.3* 35.6*   MCV 94.2 91.7 93.9   * 480* 446*     BMP:   Recent Labs     03/21/20  0530 03/21/20  1831 03/22/20  0507 03/23/20  0212     --  141 140   K 3.4* 3.5 3.4* 3.4*   CL 99  --  100 101   CO2 30  --  27 25   BUN 21  --  17 18   CREATININE 1.1  --  1.0 1.0     LIVER PROFILE:   Recent Labs     03/21/20  0530

## 2020-03-23 NOTE — BRIEF OP NOTE
Brief Postoperative Note  ______________________________________________________________    Patient: Evita Rivas  YOB: 1965  MRN: [id number]  Date of Procedure: 3/23/2020    Pre-Op Diagnosis: sick sinus syndrome    Post-Op Diagnosis: Same       Procedure(s):  Repositioning of atrial lead pacer revision    Anesthesia: General    Surgeon(s):  Con Danielson MD    Assistant:     Estimated Blood Loss (mL): less than 50     Complications: None    Specimens:   * No specimens in log *    Implants:  Implant Name Type Inv. Item Serial No.  Lot No. LRB No. Used   LEAD [id number]  PACE TRANSV URE BIP STEROID - [id number] ICD:Lead LEAD 110853  PACE TRANSV URE BIP STEROID [id number]  N/A 1   IMPL ENVELOPE AIGIS ABSORB LG Pacemaker:Single/Dual Chamber IMPL ENVELOPE AIGIS ABSORB LG  [facility] [id number] N/A 1         Drains:   Urethral Catheter 18 fr (Active)   $ Urethral catheter insertion $ Not inserted for procedure 3/16/2020  6:31 AM   Catheter Indications Need for fluid management in critically ill patients in a critical care setting not able to be managed by other means such as BSC with hat, bedpan, urinal, condom catheter, or short term intermittent urethral catherization 3/21/2020 10:11 PM   Site Assessment No urethral drainage 3/21/2020 10:11 PM   Urine Color Sana 3/21/2020 10:11 PM   Urine Appearance Sediment 3/21/2020 10:11 PM   Urine Odor Fruity 3/21/2020 10:11 PM   Output (mL) 825 mL 3/23/2020  6:00 AM       [REMOVED] NG/OG/NJ/NE Tube Orogastric 18 fr Right mouth (Removed)   Surrounding Skin Dry; Intact 3/14/2020  2:00 AM   Securement device Yes 3/14/2020  2:00 AM   Status Suction-low intermittent 3/14/2020  2:00 AM   Placement Verified by External Catheter Length 3/14/2020  2:00 AM   NG/OG/NJ/NE External Measurement (cm) 65 cm 3/14/2020  2:00 AM   Drainage Appearance Green 3/13/2020  8:42 AM   Tube Feeding Other Tube Feeding (must specify product in comment)

## 2020-03-23 NOTE — PROGRESS NOTES
3/13-17  Ceftriaxone 3/14-17  Flagyl 3/14-17  Ampicillin-sulbactam 3/9-14  Acyclovir 3/11-12  Cefepime 3/8  Zosyn 3/8-9  Ceftriaxone 3/9? Review of Systems   Unable to perform ROS: Mental status change   Severe sepsis, streptococcus pneumoniae. Physical Exam:  Vital Signs: /67   Pulse 73   Temp 98.5 °F (36.9 °C) (Axillary)   Resp 13   Ht (S) 5' 10\" (1.778 m)   Wt 195 lb 11.2 oz (88.8 kg)   SpO2 96%   BMI 28.08 kg/m²     Gen: Confused and disoriented, no distress  Skin: no stigmata of endocarditis  HEMT: AT/NC Oropharynx pink, moist, and without lesions or exudates; dentition in good state of repair  Eyes: PERRLA, EOMI, conjunctiva pink right eye, sclera anicteric right eye. Left eye is bulging, with reddened sclera, reddened and edematous conjunctiva. Neck: Supple. Trachea midline. No LAD. Chest: no distress and CTA. Good air movement. Oxygen per NC   Heart: RRR and no MRG. Abd: soft, non-distended, no tenderness, no hepatomegaly. Normoactive bowel sounds. OG to LIS draining hendrix drainage. Ext: no clubbing, cyanosis, or edema  Temporary Pacer: Intact RIJ without erythema or edema at site  Catheter Site: without erythema or tenderness draining clear yellow urine  Neuro: Confused and disoriented. CN 2-12 intact and no focal sensory or motor deficits     Radiologic / Imaging / TESTING  3/12/20 XR Chest Portable:  Impression   1. Endotracheal tube in satisfactory position above the esa   2. Otherwise, stable chest demonstrating bibasilar hypoaeration     3/13/20 XR Chest Portable:  Impression   Support tubes and lines as above.  Layering bilateral pleural effusions   adjacent atelectasis. 3/16/20 XR Chest Portable:  Impression   Support lines in good position       Stable congestive changes.        Labs:       CULTURE results: Invalid input(s): BLOOD CULTURE,  URINE CULTURE, SURGICAL CULTURE    Diagnosis:  Patient Active Problem List   Diagnosis    Anxiety    Sepsis ([address]    Metabolic encephalopathy    Delirium    Hyperammonemia (HCC)    Hyperglycemia    Diverticulitis of colon    Acute respiratory failure with hypoxia (HCC)       Active Problems  Active Problems:    Sepsis (HCC)    Metabolic encephalopathy    Delirium    Hyperammonemia (HCC)    Hyperglycemia    Diverticulitis of colon    Acute respiratory failure with hypoxia (HCC)  Resolved Problems:    * No resolved hospital problems. *    Electronically signed by: Electronically signed by Belkis Cunha.  ROSALIA Floyd CNP on 3/23/2020 at 10:57 AM

## 2020-03-24 VITALS
HEIGHT: 70 IN | TEMPERATURE: 97.6 F | BODY MASS INDEX: 29.22 KG/M2 | SYSTOLIC BLOOD PRESSURE: 129 MMHG | DIASTOLIC BLOOD PRESSURE: 65 MMHG | HEART RATE: 80 BPM | OXYGEN SATURATION: 98 % | WEIGHT: 204.1 LBS | RESPIRATION RATE: 17 BRPM

## 2020-03-24 LAB
ALBUMIN SERPL-MCNC: 3.3 GM/DL (ref 3.4–5)
ALP BLD-CCNC: 92 IU/L (ref 40–129)
ALT SERPL-CCNC: 34 U/L (ref 10–40)
ANION GAP SERPL CALCULATED.3IONS-SCNC: 15 MMOL/L (ref 4–16)
AST SERPL-CCNC: 33 IU/L (ref 15–37)
BILIRUB SERPL-MCNC: 0.4 MG/DL (ref 0–1)
BILIRUBIN DIRECT: 0.2 MG/DL (ref 0–0.3)
BILIRUBIN, INDIRECT: 0.2 MG/DL (ref 0–0.7)
BUN BLDV-MCNC: 14 MG/DL (ref 6–23)
CALCIUM SERPL-MCNC: 8.7 MG/DL (ref 8.3–10.6)
CHLORIDE BLD-SCNC: 104 MMOL/L (ref 99–110)
CO2: 22 MMOL/L (ref 21–32)
CREAT SERPL-MCNC: 1 MG/DL (ref 0.9–1.3)
CULTURE: NORMAL
CULTURE: NORMAL
GFR AFRICAN AMERICAN: >60 ML/MIN/1.73M2
GFR NON-AFRICAN AMERICAN: >60 ML/MIN/1.73M2
GLUCOSE BLD-MCNC: 103 MG/DL (ref 70–99)
HCT VFR BLD CALC: 34.4 % (ref 42–52)
HEMOGLOBIN: 10.8 GM/DL (ref 13.5–18)
Lab: NORMAL
Lab: NORMAL
MCH RBC QN AUTO: 29 PG (ref 27–31)
MCHC RBC AUTO-ENTMCNC: 31.4 % (ref 32–36)
MCV RBC AUTO: 92.2 FL (ref 78–100)
PDW BLD-RTO: 15.6 % (ref 11.7–14.9)
PLATELET # BLD: 458 K/CU MM (ref 140–440)
PMV BLD AUTO: 10 FL (ref 7.5–11.1)
POTASSIUM SERPL-SCNC: 3.4 MMOL/L (ref 3.5–5.1)
POTASSIUM SERPL-SCNC: 3.6 MMOL/L (ref 3.5–5.1)
RBC # BLD: 3.73 M/CU MM (ref 4.6–6.2)
SODIUM BLD-SCNC: 141 MMOL/L (ref 135–145)
SPECIMEN: NORMAL
SPECIMEN: NORMAL
TOTAL PROTEIN: 5.6 GM/DL (ref 6.4–8.2)
WBC # BLD: 12.1 K/CU MM (ref 4–10.5)

## 2020-03-24 PROCEDURE — C9113 INJ PANTOPRAZOLE SODIUM, VIA: HCPCS | Performed by: INTERNAL MEDICINE

## 2020-03-24 PROCEDURE — 94761 N-INVAS EAR/PLS OXIMETRY MLT: CPT

## 2020-03-24 PROCEDURE — 2580000003 HC RX 258: Performed by: INTERNAL MEDICINE

## 2020-03-24 PROCEDURE — 80053 COMPREHEN METABOLIC PANEL: CPT

## 2020-03-24 PROCEDURE — 85027 COMPLETE CBC AUTOMATED: CPT

## 2020-03-24 PROCEDURE — 6370000000 HC RX 637 (ALT 250 FOR IP): Performed by: INTERNAL MEDICINE

## 2020-03-24 PROCEDURE — 82248 BILIRUBIN DIRECT: CPT

## 2020-03-24 PROCEDURE — 6360000002 HC RX W HCPCS: Performed by: INTERNAL MEDICINE

## 2020-03-24 PROCEDURE — 94640 AIRWAY INHALATION TREATMENT: CPT

## 2020-03-24 PROCEDURE — 84132 ASSAY OF SERUM POTASSIUM: CPT

## 2020-03-24 RX ORDER — FUROSEMIDE 20 MG/1
20 TABLET ORAL DAILY
Qty: 60 TABLET | Refills: 3 | Status: SHIPPED | OUTPATIENT
Start: 2020-03-25 | End: 2020-06-15 | Stop reason: SDUPTHER

## 2020-03-24 RX ORDER — ASPIRIN 81 MG/1
81 TABLET, CHEWABLE ORAL DAILY
Qty: 30 TABLET | Refills: 3 | Status: SHIPPED | OUTPATIENT
Start: 2020-03-25 | End: 2020-09-02 | Stop reason: CLARIF

## 2020-03-24 RX ORDER — POTASSIUM CHLORIDE 20 MEQ/1
20 TABLET, EXTENDED RELEASE ORAL DAILY
Qty: 60 TABLET | Refills: 3 | Status: SHIPPED | OUTPATIENT
Start: 2020-03-25 | End: 2020-06-15 | Stop reason: SDUPTHER

## 2020-03-24 RX ORDER — METOPROLOL SUCCINATE 50 MG/1
50 TABLET, EXTENDED RELEASE ORAL DAILY
Qty: 30 TABLET | Refills: 3 | Status: SHIPPED | OUTPATIENT
Start: 2020-03-25 | End: 2020-06-15 | Stop reason: SDUPTHER

## 2020-03-24 RX ORDER — LANOLIN ALCOHOL/MO/W.PET/CERES
400 CREAM (GRAM) TOPICAL 2 TIMES DAILY
Qty: 30 TABLET | Refills: 3 | Status: SHIPPED | OUTPATIENT
Start: 2020-03-24 | End: 2020-06-15 | Stop reason: SDUPTHER

## 2020-03-24 RX ORDER — LISINOPRIL 10 MG/1
10 TABLET ORAL DAILY
Qty: 30 TABLET | Refills: 3 | Status: SHIPPED | OUTPATIENT
Start: 2020-03-25 | End: 2020-06-15 | Stop reason: SDUPTHER

## 2020-03-24 RX ADMIN — ENOXAPARIN SODIUM 30 MG: 30 INJECTION SUBCUTANEOUS at 09:26

## 2020-03-24 RX ADMIN — SODIUM CHLORIDE, PRESERVATIVE FREE 10 ML: 5 INJECTION INTRAVENOUS at 09:27

## 2020-03-24 RX ADMIN — POTASSIUM CHLORIDE 20 MEQ: 1500 TABLET, EXTENDED RELEASE ORAL at 09:26

## 2020-03-24 RX ADMIN — Medication 400 MG: at 09:26

## 2020-03-24 RX ADMIN — IPRATROPIUM BROMIDE AND ALBUTEROL SULFATE 1 AMPULE: .5; 3 SOLUTION RESPIRATORY (INHALATION) at 08:15

## 2020-03-24 RX ADMIN — THIAMINE HYDROCHLORIDE 100 MG: 100 INJECTION, SOLUTION INTRAMUSCULAR; INTRAVENOUS at 09:27

## 2020-03-24 RX ADMIN — IPRATROPIUM BROMIDE AND ALBUTEROL SULFATE 1 AMPULE: .5; 3 SOLUTION RESPIRATORY (INHALATION) at 11:48

## 2020-03-24 RX ADMIN — ASPIRIN 81 MG 81 MG: 81 TABLET ORAL at 09:26

## 2020-03-24 RX ADMIN — LISINOPRIL 10 MG: 10 TABLET ORAL at 09:26

## 2020-03-24 RX ADMIN — SODIUM CHLORIDE, PRESERVATIVE FREE 10 ML: 5 INJECTION INTRAVENOUS at 09:28

## 2020-03-24 RX ADMIN — FUROSEMIDE 20 MG: 20 TABLET ORAL at 09:26

## 2020-03-24 RX ADMIN — PANTOPRAZOLE SODIUM 40 MG: 40 INJECTION, POWDER, FOR SOLUTION INTRAVENOUS at 09:26

## 2020-03-24 RX ADMIN — POTASSIUM CHLORIDE 40 MEQ: 1500 TABLET, EXTENDED RELEASE ORAL at 10:34

## 2020-03-24 RX ADMIN — METOPROLOL SUCCINATE 50 MG: 50 TABLET, EXTENDED RELEASE ORAL at 09:26

## 2020-03-24 ASSESSMENT — PAIN SCALES - GENERAL
PAINLEVEL_OUTOF10: 0

## 2020-03-24 NOTE — PLAN OF CARE
Problem: Falls - Risk of:  Goal: Will remain free from falls  Description: Will remain free from falls  3/24/2020 1416 by Annamaria Nielson RN  Outcome: Completed  3/24/2020 0803 by Annamaria Nielson RN  Outcome: Ongoing  Goal: Absence of physical injury  Description: Absence of physical injury  3/24/2020 1416 by Annamaria Nielson RN  Outcome: Completed  3/24/2020 0803 by Annamaria Nielson RN  Outcome: Ongoing     Problem: Restraint Use - Nonviolent/Non-Self-Destructive Behavior:  Goal: Absence of restraint indications  Description: Absence of restraint indications  3/24/2020 1416 by Annamaria Nielson RN  Outcome: Completed  3/24/2020 0803 by Annamaria Nielson RN  Outcome: Ongoing  Goal: Absence of restraint-related injury  Description: Absence of restraint-related injury  3/24/2020 1416 by Annamaria Nielson RN  Outcome: Completed  3/24/2020 0803 by Annamaria Nielson RN  Outcome: Ongoing     Problem: Nutrition  Goal: Optimal nutrition therapy  3/24/2020 1416 by Annamaria Nielson RN  Outcome: Completed  3/24/2020 0803 by Annamaria Nielson RN  Outcome: Ongoing     Problem: Pain:  Goal: Pain level will decrease  Description: Pain level will decrease  3/24/2020 1416 by Annamaria Nielson, RN  Outcome: Completed  3/24/2020 0803 by Annamaria Nielson RN  Outcome: Ongoing  Goal: Control of acute pain  Description: Control of acute pain  3/24/2020 1416 by Annamaria Nielson RN  Outcome: Completed  3/24/2020 0803 by Annamaria Nielson RN  Outcome: Ongoing  Goal: Control of chronic pain  Description: Control of chronic pain  3/24/2020 1416 by Annamaria Nielson, RN  Outcome: Completed  3/24/2020 0803 by Annamaria Nielson RN  Outcome: Ongoing     Problem: Discharge Planning:  Goal: Participates in care planning  Description: Participates in care planning  3/24/2020 1416 by Annamaria Nielson RN  Outcome: Completed  3/24/2020 0803 by Annamaria Nielson RN  Outcome: Ongoing  Goal: Discharged to appropriate level of care  Description: Ongoing     Problem: Nutrition Deficit:  Goal: Ability to achieve adequate nutritional intake will improve  Description: Ability to achieve adequate nutritional intake will improve  3/24/2020 1416 by Andry Green RN  Outcome: Completed  3/24/2020 0803 by Andry Green RN  Outcome: Ongoing     Problem: Serum Glucose Level - Abnormal:  Goal: Ability to maintain appropriate glucose levels will improve to within specified parameters  Description: Ability to maintain appropriate glucose levels will improve to within specified parameters  3/24/2020 1416 by Andry Green RN  Outcome: Completed  3/24/2020 0803 by Andry Green RN  Outcome: Ongoing     Problem: Skin Integrity - Impaired:  Goal: Will show no infection signs and symptoms  Description: Will show no infection signs and symptoms  3/24/2020 1416 by Andry Green RN  Outcome: Completed  3/24/2020 0803 by Andry Green RN  Outcome: Ongoing  Goal: Absence of new skin breakdown  Description: Absence of new skin breakdown  3/24/2020 1416 by Andry Green RN  Outcome: Completed  3/24/2020 0803 by Andry Green RN  Outcome: Ongoing     Problem: Sleep Pattern Disturbance:  Goal: Appears well-rested  Description: Appears well-rested  3/24/2020 1416 by Andry Green RN  Outcome: Completed  3/24/2020 0803 by Andry Green RN  Outcome: Ongoing     Problem: Tissue Perfusion - Cardiopulmonary, Altered:  Goal: Absence of angina  Description: Absence of angina  3/24/2020 1416 by Andry Green RN  Outcome: Completed  3/24/2020 0803 by Andry Green RN  Outcome: Ongoing  Goal: Hemodynamic stability will improve  Description: Hemodynamic stability will improve  3/24/2020 1416 by Andry Green RN  Outcome: Completed  3/24/2020 0803 by Andry Green RN  Outcome: Ongoing

## 2020-03-24 NOTE — PLAN OF CARE
Problem: Falls - Risk of:  Goal: Will remain free from falls  Description: Will remain free from falls  3/24/2020 0803 by Bhupinder Corral RN  Outcome: Ongoing  3/23/2020 2228 by Martín Gonzalez RN  Outcome: Ongoing  Goal: Absence of physical injury  Description: Absence of physical injury  3/24/2020 0803 by Bhupinder Corral RN  Outcome: Ongoing  3/23/2020 2228 by Martín Gonzalez RN  Outcome: Ongoing     Problem: Restraint Use - Nonviolent/Non-Self-Destructive Behavior:  Goal: Absence of restraint indications  Description: Absence of restraint indications  3/24/2020 0803 by Bhupinder Corral RN  Outcome: Ongoing  3/23/2020 2228 by Martín Gonzalez RN  Outcome: Ongoing  Goal: Absence of restraint-related injury  Description: Absence of restraint-related injury  3/24/2020 0803 by Bhupinder Corral RN  Outcome: Ongoing  3/23/2020 2228 by Martín Gonzalez RN  Outcome: Ongoing     Problem: Nutrition  Goal: Optimal nutrition therapy  3/24/2020 0803 by Bhupinder Corral RN  Outcome: Ongoing  3/23/2020 2228 by Martín Gonzalez RN  Outcome: Ongoing     Problem: Pain:  Goal: Pain level will decrease  Description: Pain level will decrease  3/24/2020 0803 by Bhupinder Corral RN  Outcome: Ongoing  3/23/2020 2228 by Martín Gonzalez RN  Outcome: Ongoing  Goal: Control of acute pain  Description: Control of acute pain  3/24/2020 0803 by Bhupinder Corral RN  Outcome: Ongoing  3/23/2020 2228 by Martín Gonzalez RN  Outcome: Ongoing  Goal: Control of chronic pain  Description: Control of chronic pain  3/24/2020 0803 by Bhupinder Corral RN  Outcome: Ongoing  3/23/2020 2228 by Martín Gonzalez RN  Outcome: Ongoing     Problem: Discharge Planning:  Goal: Participates in care planning  Description: Participates in care planning  3/24/2020 0803 by Bhupinder Corral RN  Outcome: Ongoing  3/23/2020 2228 by Martín Gonzalez RN  Outcome: Ongoing  Goal: Discharged to appropriate level of care  Description: Discharged to appropriate level of care  3/24/2020 0803 by Jero Henry RN  Outcome: Ongoing  3/23/2020 2228 by Meme Guaman RN  Outcome: Ongoing     Problem: Airway Clearance - Ineffective:  Goal: Ability to maintain a clear airway will improve  Description: Ability to maintain a clear airway will improve  3/24/2020 0803 by Jero Henry RN  Outcome: Ongoing  3/23/2020 2228 by Meme Guaman RN  Outcome: Ongoing     Problem: Anxiety/Stress:  Goal: Level of anxiety will decrease  Description: Level of anxiety will decrease  3/24/2020 0803 by Jero Henry RN  Outcome: Ongoing  3/23/2020 2228 by Meme Guaman RN  Outcome: Ongoing     Problem: Aspiration:  Goal: Absence of aspiration  Description: Absence of aspiration  3/24/2020 0803 by Jero Henry RN  Outcome: Ongoing  3/23/2020 2228 by Meme Guaman RN  Outcome: Ongoing     Problem:  Bowel Function - Altered:  Goal: Bowel elimination is within specified parameters  Description: Bowel elimination is within specified parameters  3/24/2020 0803 by Jero Henry RN  Outcome: Ongoing  3/23/2020 2228 by Meme Guaman RN  Outcome: Ongoing     Problem: Cardiac Output - Decreased:  Goal: Hemodynamic stability will improve  Description: Hemodynamic stability will improve  3/24/2020 0803 by Jero Henry RN  Outcome: Ongoing  3/23/2020 2228 by Meme Guaman RN  Outcome: Ongoing     Problem: Fluid Volume - Imbalance:  Goal: Absence of imbalanced fluid volume signs and symptoms  Description: Absence of imbalanced fluid volume signs and symptoms  3/24/2020 0803 by Jero Henry RN  Outcome: Ongoing  3/23/2020 2228 by Meme Guaman RN  Outcome: Ongoing     Problem: Mental Status - Impaired:  Goal: Mental status will be restored to baseline  Description: Mental status will be restored to baseline  3/24/2020 0803 by Jero Henry RN  Outcome: Ongoing  3/23/2020 2228 by Meme Guaman RN  Outcome: Ongoing     Problem: Nutrition Deficit:  Goal: Ability to achieve adequate nutritional intake will improve  Description: Ability to achieve adequate nutritional intake will improve  3/24/2020 0803 by Adam Galicia RN  Outcome: Ongoing  3/23/2020 2228 by Anne Gongora RN  Outcome: Ongoing     Problem: Serum Glucose Level - Abnormal:  Goal: Ability to maintain appropriate glucose levels will improve to within specified parameters  Description: Ability to maintain appropriate glucose levels will improve to within specified parameters  3/24/2020 0803 by Adam Galicia RN  Outcome: Ongoing  3/23/2020 2228 by Anne Gongora RN  Outcome: Ongoing     Problem: Skin Integrity - Impaired:  Goal: Will show no infection signs and symptoms  Description: Will show no infection signs and symptoms  3/24/2020 0803 by Adam Galicia RN  Outcome: Ongoing  3/23/2020 2228 by Anne Gongora RN  Outcome: Ongoing  Goal: Absence of new skin breakdown  Description: Absence of new skin breakdown  3/24/2020 0803 by Adam Galicia RN  Outcome: Ongoing  3/23/2020 2228 by Anne Gongora RN  Outcome: Ongoing     Problem: Sleep Pattern Disturbance:  Goal: Appears well-rested  Description: Appears well-rested  3/24/2020 0803 by Adam Galicia RN  Outcome: Ongoing  3/23/2020 2228 by Anne Gongora RN  Outcome: Ongoing     Problem: Tissue Perfusion - Cardiopulmonary, Altered:  Goal: Absence of angina  Description: Absence of angina  3/24/2020 0803 by Adam Galicia RN  Outcome: Ongoing  3/23/2020 2228 by Anne Gongora RN  Outcome: Ongoing  Goal: Hemodynamic stability will improve  Description: Hemodynamic stability will improve  3/24/2020 0803 by Adam Galicia RN  Outcome: Ongoing  3/23/2020 2228 by Anne Gongora RN  Outcome: Ongoing

## 2020-03-24 NOTE — DISCHARGE INSTR - COC
Last Indicated Last Indicated By Review Planned Expiration Resolved Resolved By    Streptococcus pneumonia 03/08/20 03/13/20 03/08/20 Culture, Respiratory 03/14/20       Resolved    Streptococcus pneumonia 03/08/20 03/11/20 03/08/20 Culture, Respiratory   03/13/20 Izabela Metzger LPN          Nurse Assessment:  Last Vital Signs: /65   Pulse 80   Temp 97.6 °F (36.4 °C) (Oral)   Resp 17   Ht (S) 5' 10\" (1.778 m)   Wt 204 lb 1.6 oz (92.6 kg)   SpO2 98%   BMI 29.29 kg/m²     Last documented pain score (0-10 scale): Pain Level: 0  Last Weight:   Wt Readings from Last 1 Encounters:   03/24/20 204 lb 1.6 oz (92.6 kg)     Mental Status:  {IP PT MENTAL STATUS:20030}    IV Access:  { OSMEL IV ACCESS:969148950}    Nursing Mobility/ADLs:  Walking   {P DME SNKK:181878699}  Transfer  {P DME CCSB:545155181}  Bathing  {CHP DME URLB:529890149}  Dressing  {CHP DME OBGB:040398893}  Toileting  {P DME XLLY:721955357}  Feeding  {P DME VRXJ:099265920}  Med Admin  {P DME CMNX:021006953}  Med Delivery   { OSMEL MED Delivery:108431762}    Wound Care Documentation and Therapy:  Wound 03/14/20 Elbow Right;Posterior shearing abrasion from scooting around in bed (Active)   Dressing Status Dry;Clean; Intact 3/23/2020  8:52 PM   Dressing Changed Changed/New 3/23/2020  8:52 PM   Wound Cleansed Soap and water 3/14/2020  7:45 PM   Wound Length (cm) 1 cm 3/14/2020  7:45 PM   Wound Width (cm) 1 cm 3/14/2020  7:45 PM   Wound Surface Area (cm^2) 1 cm^2 3/14/2020  7:45 PM   Wound Assessment Fragile;Pink;Red 3/23/2020  8:52 PM   Number of days: 9        Elimination:  Continence:   · Bowel: {YES / ZT:12213}  · Bladder: {YES / SA:09458}  Urinary Catheter: {Urinary Catheter:287204148}   Colostomy/Ileostomy/Ileal Conduit: {YES / QT:15443}       Date of Last BM: ***    Intake/Output Summary (Last 24 hours) at 3/24/2020 1406  Last data filed at 3/24/2020 1251  Gross per 24 hour   Intake 10 ml   Output 2200 ml   Net -2190 ml     I/O last 3 completed shifts:   In: 10 [I.V.:10]  Out: 1350 [Urine:1350]    Safety Concerns:     [address] [employer] Safety Concerns:795380425}    Impairments/Disabilities:      [address] [employer] Impairments/Disabilities:057206970}    Nutrition Therapy:  Current Nutrition Therapy:   [address] [employer] Diet List:272738002}    Routes of Feeding: {CHP DME Other Feedings:014801733}  Liquids: {Slp liquid thickness:21431}  Daily Fluid Restriction: {CHP DME Yes amt example:907121249}  Last Modified Barium Swallow with Video (Video Swallowing Test): {Done Not Done XNFZ:398210989}    Treatments at the Time of Hospital Discharge:   Respiratory Treatments: ***  Oxygen Therapy:  {Therapy; copd oxygen:64698}  Ventilator:    {[facility] Vent XIGA:999666992}    Rehab Therapies: {THERAPEUTIC INTERVENTION:7984551497}  Weight Bearing Status/Restrictions: {[facility] Weight Bearin}  Other Medical Equipment (for information only, NOT a DME order):  {EQUIPMENT:510172513}  Other Treatments: ***    Patient's personal belongings (please select all that are sent with patient):  {P DME Belongings:560679409}    RN SIGNATURE:  {Esignature:084034645}    CASE MANAGEMENT/SOCIAL WORK SECTION    Inpatient Status Date: ***    Readmission Risk Assessment Score:  Readmission Risk              Risk of Unplanned Readmission:        19           Discharging to Facility/ Agency   · Name:   · Address:  · Phone:  · Fax:    Dialysis Facility (if applicable)   · Name:  · Address:  · Dialysis Schedule:  · Phone:  · Fax:    / signature: {Esignature:061092855}    PHYSICIAN SECTION    Prognosis: {Prognosis:6788835790}    Condition at Discharge: Stable    Rehab Potential (if transferring to Rehab): {Prognosis:1421373240}    Recommended Labs or Other Treatments After Discharge: None    Physician Certification: I certify the above information and transfer of Missy Sarah  is necessary for the continuing treatment of the diagnosis listed and that he requires {Admit to Appropriate

## 2020-03-24 NOTE — ADT AUTH CERT
> Strep Pneumoniae Pneumonia   - HSV: Negative. Stopped acyclovir. CT abdomen pelvis: Diverticulitis; new bibasilar consolidations; possible cirrhosis. - rule out aseptic meningitis , CSF showed 6 WBCs and 100% lymphocytes. CSF culture: No growth to date   - Pulm, ID consulted   -Respiratory culture: Strep pneumo resistant to penicillin. Stopped Unasyn. On Rocephin and add Flagyl.   -Continue vancomycin. Blood culture: No growth. - Vanc, flagyl and rocephin discontinued   - 3/19 elevated leukocytosis 21 most likely sec to overnight arrhythmia, doubt infection, repeat blood cx ordered, ID on board. Monitor WBC off Abx,   - weaned off oxygen, no fever, improved leukocytosis, PT/OT/DC planning      >NSTEMI with arrythmia/2:1 heart block   > V-fib   -Cardio following   -Cardiac cath: Mild CAD. Echo: 50-55%; severe eccentric aortic regurgitation. Aspirin.   -Off dopamine drip. - 3/19 had Vfib for 31 sec overnight, converted back to yennifer and pt gained back conscious on his own before CPR start. Pt later on in the day after noon 3/19 had another incident where he had few chest compression and converted back to yennifer and gained conscious, cardiology notified, started amiodarone. Had temporary pacemaker placed. - 3/20 permanent pacemaker placed by CT surgery   - 3/23 Repositioning of atrial lead pacer revision      >Acute metabolic encephalopathy, Likely multifactorial from hyperammonemia, amphetamine use, acute kidney injury, sepsis. - Ammonia level:470, but quickly normalized. CT head non-acute. - Neuro consulted. CSF: slightly elevated WBCs. - PRN Ativan and Geodon. Agitation may be due to possible alcohol withdrawal. EEG: Severe diffuse slowing of the background. MRI brain without contrast: No acute process. - Started Seroquel, PRN Ativan to use in between Geodon. CT head: No acute process.    - on versed, fentanyl, ativan prn with CIWA , geodon prn,   - 3/19 extubated, off versed , fentanyl, monitor for improvement    - 3/20 on precedex, improving mental status   - 3/21 weaned off precedex, back to normal      >Severe Anion gap metabolic acidosis-resolved   - Nephrology following. Likely from elevated lactate sec to sepsis. Patient received sodium bicarb. - Acidosis has resolved. >Acute respiratory failure due to pneumonia and now CHF   -CT chest non-acute, on the vent. pulm consulted. -Pulmonology would like to attempt extubation on patient, however patient is requiring significant sedation due to agitation. Unable to use Precedex due to bradycardia. -CXR: Congestive changes. on Lasix. - extubated 3/18, on oxygen NC       >Severe aortic regurg   need valve surgery eval once stable. valve surgery once stable       >Alcoholism   - Possible withdrawal. On Versed , precedex,    - Ativan as needed. >Acute kidney injury    -Nephrology following. Creatinine appears to be stable. >Rhabdomyolysis   Possible concern for seizure. On IV fluids. CK improved      >Anemia   Monitor hemoglobin. Stable      >Subconjunctival hemorrhage OS. With chemosis. - was seen by ophthalmology    - Likely related to being on ventilator. No signs of orbital hemorrhage.    - conservative management, expected to resolve on its own. >Hypomagnesemia   >Hypocalcemia   Started replacement protocol. >Hyperglycemia   -Not diabetic. hemoglobin A1C: normal         SPEECH:   IMPRESSION AND RECOMMENDATIONS:    Natali Gardner was seen for a bedside swallowing treatment and diet tolerance monitoring. He was seated upright in bed, alert, cooperative throughout. Oral mechanism examination WFL without focal deficit or asymmetry. He was presented with PO trials of thin liquids via cup/straw, puree, and regular solids. Oral stage WFL with intact mastication, AP transit, oral clearance. Pharyngeal stage WFL with adequate swallow initiation/laryngeal elevation and no s/s aspiration across all trials. Recommend continued regular diet. No further acute SLP needs identified.         Plan home with c, pending cardio clearance, anticipate tomorrow         Sepsis and Other Febrile Illness, without Focal Infection - Care Day 15 (3/22/2020) by Mary Kate Florez RN   Review Status Review Entered   Completed 3/23/2020 17:04     Criteria Review        Care Day: 15 Care Date: 3/22/2020 Level of Care: Step Down      Guideline Day 4      Level Of Care      (X) Floor to discharge [J]      Clinical Status      (X) * Hemodynamic stability      3/23/2020 5:04 PM EDT by Chuyita Service      BP (!) 141/61  Pulse 73  Temp 98.1 °F (36.7 °C) (Oral)  Resp 15  Ht (S) 5' 10\" (1.778 m)  Wt 199 lb (90.3 kg)  SpO2 94%  BMI 28.55 kg/m ²      (X) * Fever absent or acceptable for next level of care      (X) * Hypoxemia absent      (X) * Tachypnea absent      (X) * Cultures negative or infection identified and under adequate treatment      (X) * Mental status at baseline      ( ) * Metabolic derangement (eg, dehydration, acidosis) absent      ( ) * End-organ dysfunction (eg, myocardial ischemia, renal failure) absent      ( ) * Discharge plans and education understood      Activity      (X) * Ambulatory      Routes      (X) * Oral hydration, medications [K]      3/23/2020 5:04 PM EDT by Chuyita Service      asa po x1, lovenox sc, lasix po, duoneb x3, lisinopril x1, mag ox po x2, toprol po x1, protonix iv x1, kcl po x1, thiamine iv x1      (X) Usual diet      Interventions      (X) WBC      Medications      (X) * Antimicrobial treatment not necessary or treatment at next level of care arranged [E]      * Milestone     Additional Notes   3/22    Cardio:   patient is awake alert doing better   Compared to last week   Heart rate is paced    Atrial lead dislodged need reposition -plan for tomorrow   Need to increase activity    Overall improved    Continue medical treatment    Moderate to severe AI-AVR In future    Keep on current meds Guideline Day 4      Level Of Care      (X) Floor to discharge [J]      Clinical Status      (X) * Hemodynamic stability      3/23/2020 4:59 PM EDT by Eyal Cox      BP:(!) 155/74  Pulse:71  Resp:13  Temp:   SpO2:98%      ( ) * Fever absent or acceptable for next level of care      (X) * Hypoxemia absent      (X) * Tachypnea absent      (X) * Cultures negative or infection identified and under adequate treatment      (X) * Mental status at baseline      ( ) * Metabolic derangement (eg, dehydration, acidosis) absent      ( ) * End-organ dysfunction (eg, myocardial ischemia, renal failure) absent      ( ) * Discharge plans and education understood      Activity      (X) * Ambulatory      Routes      (X) * Oral hydration, medications [K]      3/23/2020 4:59 PM EDT by Eyal Cox      asa, lovenox, lasix po, duoneb x2, lisinopril x1, mag ox x2, toprol x1, lopressor po x1, protonix iv x1, kcl po , thiaimine iv, valium po x1,      (X) Usual diet      Interventions      (X) WBC      Medications      (X) * Antimicrobial treatment not necessary or treatment at next level of care arranged [E]      * Milestone     Additional Notes   3/21 Pt oriented x 3 , no chest pain, no abd pain, no N/V, improved dyspnea. Assessment and Plan:   Anders Melendez is a 62 y.o. male who presents with <principal problem not specified>      > Severe sepsis   > Acute diverticulitis,    > Strep Pneumoniae Pneumonia   - HSV: Negative. Stopped acyclovir. CT abdomen pelvis: Diverticulitis; new bibasilar consolidations; possible cirrhosis. - rule out aseptic meningitis , CSF showed 6 WBCs and 100% lymphocytes. CSF culture: No growth to date   - Pulm, ID consulted   -Respiratory culture: Strep pneumo resistant to penicillin. Stopped Unasyn. On Rocephin and add Flagyl.   -Continue vancomycin. Blood culture: No growth.    - Vanc, flagyl and rocephin discontinued   - 3/19 elevated leukocytosis 21 most likely sec to overnight arrhythmia, doubt infection, repeat blood cx ordered, ID on board. - improved on 4 L./min NC, Monitor WBC off Abx, transfer to Med surg, PT/OT/DC planning      >NSTEMI with arrythmia/2:1 heart block   > V-fib   -Cardio following   -Cardiac cath: Mild CAD. Echo: 50-55%; severe eccentric aortic regurgitation. Aspirin.   -Off dopamine drip. - 3/19 had Vfib for 31 sec overnight, converted back to yennifer and pt gained back conscious on his own before CPR start. Pt later on in the day after noon 3/19 had another incident where he had few chest compression and converted back to yennifer and gained conscious, cardiology notified, started amiodarone. Had temporary pacemaker placed. - 3/20 permanent pacemaker placed by CT surgery      >Acute metabolic encephalopathy, Likely multifactorial from hyperammonemia, amphetamine use, acute kidney injury, sepsis. - Ammonia level:470, but quickly normalized. CT head non-acute. - Neuro consulted. CSF: slightly elevated WBCs. - PRN Ativan and Geodon. Agitation may be due to possible alcohol withdrawal. EEG: Severe diffuse slowing of the background. MRI brain without contrast: No acute process. - Started Seroquel, PRN Ativan to use in between Geodon. CT head: No acute process. - on versed, fentanyl, ativan prn with CIWA , geodon prn,   - 3/19 extubated, off versed , fentanyl, monitor for improvement    - 3/20 on precedex, improving mental status   - 3/21 back to normal      >Severe Anion gap metabolic acidosis-resolved   - Nephrology following. Likely from elevated lactate sec to sepsis. Patient received sodium bicarb. - Acidosis has resolved. >Acute respiratory failure due to pneumonia and now CHF   -CT chest non-acute, on the vent. pulm consulted. -Pulmonology would like to attempt extubation on patient, however patient is requiring significant sedation due to agitation. Unable to use Precedex due to bradycardia. -CXR: Congestive changes. on Lasix.     - extubated 3/18, on of the background. MRI brain without contrast: No acute process. - Started Seroquel, PRN Ativan to use in between Geodon. CT head: No acute process. - on versed, fentanyl, ativan prn with CIWA  geodon prn,   - 3/19 extubated, off versed , fentanyl, monitor for improvement    - 3/20 on precedex, improving mental status      >Severe Anion gap metabolic acidosis-resolved   - Nephrology following. Likely from elevated lactate sec to sepsis. Patient received sodium bicarb. - Acidosis has resolved. >Acute respiratory failure due to pneumonia and now CHF   -CT chest non-acute, on the vent. pulm consulted. -Pulmonology would like to attempt extubation on patient, however patient is requiring significant sedation due to agitation. Unable to use Precedex due to bradycardia. -CXR: Congestive changes. on Lasix. - extubated 3/18, on oxygen NC       >Severe aortic regurg   need valve surgery eval once stable. valve surgery once stable       >Alcoholism   - Possible withdrawal. On Versed , precedex,    - Ativan as needed. >Acute kidney injury    -Nephrology following. Creatinine appears to be stable. >Rhabdomyolysis   Possible concern for seizure. On IV fluids. CK improved      >Anemia   Monitor hemoglobin. Stable      >Subconjunctival hemorrhage OS. With chemosis. - was seen by ophthalmology    - Likely related to being on ventilator. No signs of orbital hemorrhage.    - conservative management, expected to resolve on its own. >Hypomagnesemia   >Hypocalcemia   Started replacement protocol.       >Hyperglycemia   -Not diabetic. hemoglobin A1C: normal         Cardio:   Doing better   Heart rate is better   Paced    Keep on lopressor   Stop IVF   Had good urine out put with lasix        Sepsis and Other Febrile Illness, without Focal Infection - Care Day 12 (3/19/2020) by Rosalinda Gusman RN   Review Status Review Entered   Completed 3/23/2020 16:48     Criteria Review        Care Day: 12 following   -Cardiac cath: Mild CAD. Echo: 50-55%; severe eccentric aortic regurgitation. Aspirin.   -Off dopamine drip.    - had Vfib for 31 sec overnight, converted back to yennifer and pt gained back conscious on his own before CPR start. Pt later on in the day after noon 3/19 had another incident where he had few chest compression and converted back to yennifer and gained conscious, cardiology notified, will take to cath lab and place temporary pacemaker. And start amiodarone. >Acute metabolic encephalopathy, Likely multifactorial from hyperammonemia, amphetamine use, acute kidney injury, sepsis. - Ammonia level:470, but quickly normalized. CT head non-acute. - Neuro consulted. CSF: slightly elevated WBCs. - PRN Ativan and Geodon. Agitation may be due to possible alcohol withdrawal. EEG: Severe diffuse slowing of the background. MRI brain without contrast: No acute process. - Started Seroquel, PRN Ativan to use in between Geodon. CT head: No acute process. - on versed, fentanyl, ativan prn with CIWA , geodon prn,   - extubated, off versed , fentanyl, monitor for improvement       >Severe Anion gap metabolic acidosis-resolved   - Nephrology following. Likely from elevated lactate sec to sepsis. Patient received sodium bicarb. - Acidosis has resolved. >Acute respiratory failure due to pneumonia and now CHF   -CT chest non-acute, on the vent. pulm consulted. -Pulmonology would like to attempt extubation on patient, however patient is requiring significant sedation due to agitation. Unable to use Precedex due to bradycardia. -CXR: Congestive changes. on Lasix. - extubated 3/18, on oxygen NC      >Severe aortic regurg   need valve surgery eval once stable. >Alcoholism   - Possible withdrawal. On Versed , precedex,    - Ativan as needed. >Acute kidney injury    -Nephrology following. Creatinine appears to be stable. >Rhabdomyolysis   Possible concern for seizure. On IV fluids. 12  Ht (S) 5' 10\" (1.778 m)  Wt 213 lb 6.5 oz (96.8 kg)  SpO2 99%  BMI 30.62 kg/m²         Abg+ Ph 7.45, pco2 46.0, o2sat 94.3,         Plan:   1. Wean sedation. 2. Wean per protocol. Attending:   Assessment and Plan:   Indy Juarez is a 62 y.o. male who presents with <principal problem not specified>      > Severe sepsis   > Acute diverticulitis,    > Strep Pneumoniae Pneumonia   - HSV: Negative. Stopped acyclovir. CT abdomen pelvis: Diverticulitis; new bibasilar consolidations; possible cirrhosis. - rule out aseptic meningitis , CSF showed 6 WBCs and 100% lymphocytes. CSF culture: No growth to date   - Pulm, ID consulted   -Respiratory culture: Strep pneumo resistant to penicillin. Stopped Unasyn. On Rocephin and add Flagyl.   -Continue vancomycin. Blood culture: No growth. - Vanc, flagyl and rocephin discontinued      >Severe Anion gap metabolic acidosis-resolved   - Nephrology following. Likely from elevated lactate sec to sepsis. Patient received sodium bicarb. - Acidosis has resolved. >Acute respiratory failure due to pneumonia and now CHF   -CT chest non-acute, on the vent. pulm consulted. -Pulmonology would like to attempt extubation on patient, however patient is requiring significant sedation due to agitation. Unable to use Precedex due to bradycardia. -CXR: Congestive changes. on Lasix. - wean off of Vent as tolerated per Pulm      >Acute metabolic encephalopathy, Likely multifactorial from hyperammonemia, amphetamine use, acute kidney injury, sepsis. - Ammonia level:470, but quickly normalized. CT head non-acute. - Neuro consulted. CSF: slightly elevated WBCs. - PRN Ativan and Geodon. Agitation may be due to possible alcohol withdrawal. EEG: Severe diffuse slowing of the background. MRI brain without contrast: No acute process. - Started Seroquel, PRN Ativan to use in between Geodon. CT head: No acute process.    - on versed, fentanyl, ativan prn with CIWA , geodon prn, consider precedex and if drops HR with drop in BP then can try atropine / external pacing prn      >NSTEMI with arrythmia/2:1 heart block   -Cardio following   -Cardiac cath: Mild CAD. Echo: 50-55%; severe eccentric aortic regurgitation. Aspirin.   -Off dopamine drip. Will likely need pacemaker once more stable. >Severe aortic regurg   need valve surgery eval once stable. >Alcoholism   - Possible withdrawal. On Versed , precedex,    - Ativan as needed. >Acute kidney injury    -Nephrology following. Creatinine appears to be stable. >Rhabdomyolysis   Possible concern for seizure. On IV fluids. CK improving. >Anemia   Monitor hemoglobin. Stable      >Subconjunctival hemorrhage OS. With chemosis. - was seen by ophthalmology    - Likely related to being on ventilator. No signs of orbital hemorrhage.    - conservative management, expected to resolve on its own. >Hypomagnesemia   >Hypocalcemia   Started replacement protocol.       >Hyperglycemia   -Not diabetic. hemoglobin A1C: normal         Sepsis and Other Febrile Illness, without Focal Infection - Care Day 10 (3/17/2020) by Rachel Salamanca RN   Review Status Review Entered   Completed 3/23/2020 16:35     Criteria Review        Care Day: 10 Care Date: 3/17/2020 Level of Care:      Guideline Day 3      Level Of Care      (X) Floor      Clinical Status      ( ) * Mental status at baseline      (X) * Fever absent or reduced      Activity      (X) Activity as tolerated      Routes      (X) * Oral hydration      (X) Diet as tolerated      (X) Parenteral or oral medication      3/23/2020 4:35 PM EDT by Madhu Randhawa      albuterol hfa x1, lovneox sc x1, lasix iv x2, asa po x1, atrovent inhale x1, duoneb x2, ativan iv 1, ativan po x2, po mag ox x3, flagyl iv x3, protonix iv x1, seroquel x2, thiamine iv x1, vanco iv x1, d5 ivf 100hr, vent sedation, ativan iv x2, geodon      Interventions      (X) WBC      Medications      (X) Completed 3/23/2020 16:28     Criteria Review        Care Day: 9 Care Date: 3/16/2020 Level of Care:      Guideline Day 3      Level Of Care      (X) Floor      Clinical Status      ( ) * Mental status at baseline      3/23/2020 4:28 PM EDT by Darrion Farr      on sedation, nurse states that on sedation vacation patient finally started following commands; required im geodon x2      (X) * Fever absent or reduced      Activity      (X) Activity as tolerated      Routes      (X) * Oral hydration      (X) Diet as tolerated      (X) Parenteral or oral medication      3/23/2020 4:28 PM EDT by Drarion Farr      albuterol hfa x4, asa po x1, roceph iv x1, lovenox sc x1, lasix iv x2, atrovent inhale x4, ativan po x2, mag ox po x2, flagyl iv x3, protonix iv x1, seroquel x2, thiamine iv x1, vanco iv x1, vent sedation, geodom im x2, ativan iv x2      Interventions      (X) WBC      Medications      (X) Possible antimicrobial treatment      (X) Possible DVT prophylaxis      * Milestone     Additional Notes   3/16   -on sedation, nurse states that on sedation vacation patient finally started following commands.   -on ventilator    -Making urine.   -on TF. Had a BM yesterday.   -Blood pressure stable. Heart rate occasionally drops into the 40s. 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 HSV: Negative. Stopped acyclovir. CT abdomen pelvis: Diverticulitis; new bibasilar consolidations; possible cirrhosis. Procalcitonin is improving. Respiratory culture: Strep pneumo resistant to penicillin. Stopped Unasyn. On Rocephin and add Flagyl. Continue vancomycin. Blood culture: No growth. -We will check with ID to see if vancomycin can be stopped. 2-Severe Anion gap metabolic acidosis-resolved   Acidosis has resolved. Nephrology following. Likely from elevated lactate.  Patient received sodium bicarb. 3-Acute respiratory failure due to pneumonia and now CHF   CT chest non-acute, on the vent. pulm consulted. Pulmonology would like to attempt extubation on patient, however patient is requiring significant sedation due to agitation. Unable to use Precedex due to bradycardia. CXR: Congestive changes. Started on IV Lasix.    -Diuresed at least 3 L. CXR: Stable congestion. 4-Acute metabolic encephalopathy, Likely multifactorial from hyperammonemia, amphetamine use, acute kidney injury, sepsis. Ammonia level:470, but quickly normalized. CT head non-acute. Neuro following. CSF: slightly elevated WBCs. Possible aseptic meningitis. Very agitated. Start PRN Ativan and Geodon. Agitation may be due to possible alcohol withdrawal. EEG: Severe diffuse slowing of the background. MRI brain without contrast: No acute process. If agitation is from alcohol withdrawal then patient should start to show some behavioral improvement now. Start Seroquel twice daily to see if can help reduce combativeness. Increase PRN Ativan to use in between Geodon. - Patient is followed instructions today for the first time. It is possible that patient may have prolonged withdrawal symptoms which are finally improving. CT head: No acute process. 5-Hyperglycemia   Not diabetic. hemoglobin A1C: normal      6-NSTEMI with arrythmia/2:1 heart block   Cardio following   Cardiac cath: Mild CAD. Echo: 50-55%; severe eccentric aortic regurgitation. Aspirin. Off dopamine drip. Will likely need pacemaker once more stable. 7-Alcoholism   Possible withdrawal. On Versed drip. Also added Ativan as needed. If true alcohol withdrawal then hopeful for improvement soon.   -Once extubated will need CIWA scale. 8 acute kidney injury    Nephrology following. Creatinine appears to be stable. 9 hypocalcemia   Replacement protocol and recheck      10 rhabdomyolysis   Possible concern for seizure. On IV fluids.    CK improving. 11 severe aortic regurg   need valve surgery once stable. 12 possible aseptic meningitis   CSF showed 6 WBCs and 100% lymphocytes. CSF culture: No growth to date      13 anemia   Monitor hemoglobin. Stable      14 eyelid swelling and irritation   Likely due to edema. Irritation likely due to patient being combative. Started on Lasix.   -Opted to see patient today. 15 hypomagnesemia   Started replacement protocol.      -family reports he is not on any prescription medications, no known chronic medical issues. Need mental status to improve              Sepsis and Other Febrile Illness, without Focal Infection - Care Day 8 (3/15/2020) by Rosario Pardo RN   Review Status Review Entered   Completed 3/23/2020 16:21     Criteria Review        Care Day: 8 Care Date: 3/15/2020 Level of Care:      Guideline Day 3      Level Of Care      (X) Floor      Clinical Status      ( ) * Mental status at baseline      3/23/2020 4:21 PM EDT by Chris Vallejo      on sedation, nurse states that patient gets very agitated once Geodon wears off.       (X) * Fever absent or reduced      Activity      (X) Activity as tolerated      Routes      (X) * Oral hydration      (X) Diet as tolerated      3/23/2020 4:21 PM EDT by Chris Vallejo      TF      (X) Parenteral or oral medication      3/23/2020 4:21 PM EDT by Chris Vallejo      albuterol hfa x4, asa po x1, roceph iv x1, lovenox, lasix iv x2, atrovent inhale x4, ativan po x2, mag ox po x2, mag suflate iv x1, flagyl iv x3, protonix iv x1, seroquel po x2, vanco iv x2,vent sedation, iv ativan x2, d50 iv x1, geodon im x4      Interventions      (X) WBC      Medications      (X) Possible antimicrobial treatment      (X) Possible DVT prophylaxis      * Milestone     Additional Notes   3/15    on sedation, nurse states that patient gets very agitated once Geodon wears off.   -on ventilator    -Making urine.   -on TF   -Blood pressure stable        ASSESSMENT Pulmonology would like to attempt extubation on patient, however patient is requiring significant sedation due to agitation. Unable to use Precedex due to bradycardia. -CXR: No acute process. 4-Acute metabolic encephalopathy, Likely multifactorial from hyperammonemia, amphetamine use, acute kidney injury, sepsis. Ammonia level:470, but quickly normalized. CT head non-acute. Neuro following. CSF shows slightly elevated WBCs. Possible aseptic meningitis. Patient still gets very agitated. Start PRN Ativan and Geodon. Agitation may be due to possible alcohol withdrawal. EEG: Severe diffuse slowing of the background. MRI brain without contrast: No acute process. - If agitation is from alcohol withdrawal then patient should start to show some behavioral improvement. Now that we are passing day 5.      5-Hyperglycemia   Not diabetic as hemoglobin A1C: normal      6-NSTEMI with arrythmia/2:1 heart block   Cardio following   Cardiac cath: Mild CAD. Echo: 50-55%; severe eccentric aortic regurgitation. Aspirin. Off dopamine drip. Will likely need pacemaker once more stable. 7-? Alcoholism, CIWA once extubated. Possible withdrawal. On Versed drip. Also added Ativan as needed. If true alcohol withdrawal then hopeful for improvement soon. 8 acute kidney injury    Nephrology following. Creatinine appears to be stable. 9 hypocalcemia   Replacement protocol and recheck      10 rhabdomyolysis   Possible concern for seizure. On IV fluids. CK improving. 11 severe aortic regurg   Will need valve surgery once stable. 12 possible aseptic meningitis   CSF showed 6 WBCs and 100% lymphocytes. CSF culture: No growth to date      13 anemia   Check anemia panel. Monitor hemoglobin. 14 eyelid swelling and irritation   - Likely due to edema.  Irritation likely due to patient being combative.      -family reports he is not on any prescription medications, no known chronic medical issues Need mental status to improve    Sepsis and Other Febrile Illness, without Focal Infection - Care Day 5 (3/12/2020) by Jermaine Jaramillo RN   Review Status Review Entered   Completed 3/23/2020 15:59     Criteria Review        Care Day: 5 Care Date: 3/12/2020 Level of Care:      Guideline Day 2      Level Of Care      (X) ICU or floor      Clinical Status      (X) * Hemodynamic stability      3/23/2020 3:59 PM EDT by Enish      138/38, 46, 21, 95%      (X) * Hypoxemia absent      ( ) * Tachypnea absent      3/23/2020 3:59 PM EDT by Enish      rr 21      Activity      (X) Activity as tolerated      Routes      (X) Possible IV fluids      (X) Parenteral or oral medications      3/23/2020 3:59 PM EDT by Enish      zovirax iv x2, albuterol hfa x4, unasyn iv x4, asa po x1, lovenox sc, atrovent IN x4, protonix iv x1, vanco iv x1, ivf 10hr, fentanyl iv gtt, versed iv gtt, ivf with kcl 50hr, ca gluc iv x1      (X) Diet as tolerated      Interventions      (X) WBC      3/23/2020 3:59 PM EDT by Enish      wbc 7.0, ast 38      Medications      (X) Possible antimicrobial treatment      (X) Possible DVT prophylaxis      * Milestone     Additional Notes   3/12 cardio:   Still on vent   Await extubation    He will need pacer once stable   He will need AVR once stable   Wait for his metabolic encephalopathy to improve    Supportive care   Off dopamine    Cath mild CAD      Pt. Sedated on vent this am   HR stable, 2:1 AV block in the 40s, BP stable      Sepsis   Secondary to acute diverticulitis, PNA, also concern for meningitis   LP done, on antivirals   On ABx   On vent   Acidosis has improved on vent    pulm following      AV block   Hemodynamic    Will need PPM   LHC done-mild disease noted   GINGER done for AI-Severe- will need AVR when stable   CTS has seen-planning for surgery when stable      Attempting to wean sedation and vent today   EEG nonspecific   MRI neg. Will cont.  To follow-await (X) * Hypoxemia absent      (X) * Tachypnea absent      Activity      (X) Activity as tolerated      3/23/2020 3:48 PM EDT by Gerhardt Goldberg      remains sedated on vent      Routes      (X) Possible IV fluids      3/23/2020 3:48 PM EDT by Gerhardt Goldberg      IVF 50hr      (X) Parenteral or oral medications      3/23/2020 3:48 PM EDT by Gerhardt Goldberg      iv vent sedation, dopamine gtt, fentanyl iv gtt, d50 iv x1, ativan iv x2, geodon im x1, vanco iv x1, protonix iv x1, atrovent hfa x4, iv roceph x2, asa po x1, unasyn iv x4, zovirax iv x2      (X) Diet as tolerated      Interventions      (X) WBC      3/23/2020 3:48 PM EDT by Gerhardt Goldberg      wbc 8.3      Medications      (X) Possible antimicrobial treatment      (X) Possible DVT prophylaxis      * Milestone     Additional Notes   3/11 SUBJECTIVE   -on sedation, as per nurse patient gets really combative on sedation vacation    -on ventilator    -on dopamine   -making urine      Wbc 8.3, hgb 10.3, hct 33.7, na 134, k3.5 bun 22, creat 1.3         ASSESSMENT and [address] Day: 4      1-severe sepsis due to diverticulitis, pneumonia, aseptic meningitis   CT shows uncomplicated diverticulitis which somewhat out of proportion to severity of sepsis- blood culture pending broad spectrum abx for now   on vancomycin, Unasyn, And acyclovir. May also have Viral meningitis. - CT abdomen pelvis to be done today. Stop Rocephin since CSF is not suggestive of bacterial meningitis. Respiratory culture: Strep pneumo. 2-Severe Anion gap metabolic acidosis    Acidosis has resolved. Nephrology following. Likely from elevated lactate. Patient received sodium bicarb. 3-Acute respiratory failure due to severe metabolic acidosis   CT chest non-acute, on the vent. pulm consulted. -Pulmonology would like to attempt extubation on patient, however patient is requiring significant sedation due to agitation. Unable to use Precedex due to bradycardia.       4-Acute metabolic encephalopathy, Likely multifactorial from hyperammonemia, amphetamine use, acute kidney injury, sepsis. ammonia level was 470- normal LFTs- CT with no cirrhosis- repeat ammonia normalized, CT head non-acute. Neuro following. CSF shows slightly elevated WBCs. Possible aseptic meningitis. - Patient still gets very agitated. Start PRN Ativan and Geodon. Agitation may be due to possible alcohol withdrawal. EEG: Severe diffuse slowing of the background. MRI brain without contrast: No acute process. 5-Hyperglycemia   not diabetic as hemoglobin A1C is normal      6-NSTEMI with arrythmia/2:1 heart block   on dopamine drip, cardio following   Cardiac cath: Mild CAD. Echo: 50-55%; severe eccentric aortic regurgitation. Aspirin.   - Pacer was extubated      7-? Alcoholism, CIWA once extubated. -Possible withdrawal. On Versed drip.      8 acute kidney injury    Nephrology following. Creatinine appears to be stable. 9 hypocalcemia   Replacement protocol and recheck      10 rhabdomyolysis   May need EEG. Possible concern for seizure. Recheck CK. On IV fluids. 11 severe aortic regurg   Will need valve surgery once stable. 12 possible aseptic meningitis   -CSF showed 6 WBCs and 100% lymphocytes.  CSF culture: No growth to date      -family reports he is not on any prescription medications, no known chronic medical issues

## 2020-03-24 NOTE — PROGRESS NOTES
pantoprazole  40 mg Intravenous Daily    aspirin  81 mg Oral Daily      Infusions:   dextrose 100 mL/hr (03/17/20 2014)      PRN Meds:  potassium chloride **OR** potassium alternative oral replacement **OR** potassium chloride, sodium chloride flush, acetaminophen, diazePAM, ipratropium-albuterol, magnesium sulfate, carboxymethylcellulose, sodium chloride flush, acetaminophen, glucose, dextrose, glucagon (rDNA), dextrose, sodium chloride flush, [DISCONTINUED] acetaminophen **OR** acetaminophen       Physical Exam:  Vitals:    03/24/20 0915   BP: 129/65   Pulse:    Resp:    Temp: 97.6 °F (36.4 °C)   SpO2:         General: awake alert   Chest: Nontender  Cardiac: paced   Lungs:Clear to auscultation and percussion. Abdomen: Soft, NT, ND, +BS  Extremities: no edema   Vascular:  Equal 2+ peripheral pulses. Lab Data:  CBC:   Recent Labs     03/22/20  0507 03/23/20  0212 03/24/20  0233   WBC 12.8* 11.2* 12.1*   HGB 11.5* 11.0* 10.8*   HCT 36.3* 35.6* 34.4*   MCV 91.7 93.9 92.2   * 446* 458*     BMP:   Recent Labs     03/22/20  0507 03/23/20  0212 03/23/20  1145 03/24/20  0233    140  --  141   K 3.4* 3.4* 3.3* 3.4*    101  --  104   CO2 27 25  --  22   BUN 17 18  --  14   CREATININE 1.0 1.0  --  1.0     LIVER PROFILE:   Recent Labs     03/22/20  0507 03/23/20  0212 03/24/20  0233   AST 26 46* 33   ALT 28 39 34   BILIDIR 0.3 0.2 0.2   BILITOT 0.7 0.5 0.4   ALKPHOS 95 86 92     PT/INR: No results for input(s): PROTIME, INR in the last 72 hours. APTT: No results for input(s): APTT in the last 72 hours. BNP:  No results for input(s): BNP in the last 72 hours.       Assessment:  Patient Active Problem List    Diagnosis Date Noted    Diverticulitis of colon     Acute respiratory failure with hypoxia ([address]     Delirium     Hyperammonemia ([facility]     Hyperglycemia     Metabolic encephalopathy     Sepsis ([facility] 03/08/2020    Daisy Ramon MD 3/24/2020 9:44 AM

## 2020-03-24 NOTE — PROGRESS NOTES
8. 7   GLUCOSE 107* 100*  --  103*      ABG:  No results for input(s): PH, PO2ART, XHT2RSA, HCO3, BEART, O2SAT in the last 72 hours. Lab Results   Component Value Date    PROBNP 722.7 (H) 03/13/2020     No results found for: [facility]    Radiology Review:  Pertinent images / reports were reviewed as a part of this visit. Assessment:     Patient Active Problem List   Diagnosis    Anxiety    Sepsis ([address]    Metabolic encephalopathy    Delirium    Hyperammonemia (HCC)    Hyperglycemia    Diverticulitis of colon    Acute respiratory failure with hypoxia ([address]       Plan:   1. Inc. Activity. 2. Supp. Kcl.  3. Discussed with the nurse.   Kathrin Noonan MD  3/24/2020  10:29 AM

## 2020-03-24 NOTE — OP NOTE
Date of Procedure: 3/23/20    Surgeon: Jesus Hess MD    Anesthesia: MAC    Preoperative Diagnosis:    S/p PPM for heart block and cardiac arrest   Atrial lead dislodgement       Hepatic encephalopathy ([address] [K72.90]  Delirium [B18.0]  Metabolic acidosis [J84.0]  Diverticulitis of colon [K57.32]  Septicemia ([address] [A41.9]  Hyperglycemia [R73.9]  Elevated lactic acid level [R79.89]  Sepsis ([address] [A41.9]  Active Hospital Problems    Diagnosis Date Noted    Diverticulitis of colon [K57.32]     Acute respiratory failure with hypoxia ([address] [J96.01]     Delirium [R41.0]     Hyperammonemia ([address] [E72.20]     Hyperglycemia [X92.4]     Metabolic encephalopathy [F48.38]     Sepsis ([address] [A41.9] 03/08/2020      Bradycardia with syncope    Postoperative Diagnosis: Hepatic encephalopathy ([address] [K72.90]  Delirium [Z82.2]  Metabolic acidosis [H91.7]  Diverticulitis of colon [K57.32]  Septicemia ([address] [A41.9]  Hyperglycemia [R73.9]  Elevated lactic acid level [R79.89]  Sepsis ([address] [A41.9]   Active Hospital Problems    Diagnosis Date Noted    Diverticulitis of colon [K57.32]     Acute respiratory failure with hypoxia ([address] [J96.01]     Delirium [R41.0]     Hyperammonemia ([address] [E72.20]     Hyperglycemia [X37.0]     Metabolic encephalopathy [W32.37]     Sepsis ([address] [A41.9] 03/08/2020       Operation: Explantation of the previous permanent pacemaker      Removal of the previous atrial lead    Placement of a new atrial tined lead    Placement of Tyrex pouch    Details of Procedure:  Patient was brought to the operating room after request from cardiology and preoperative evaluations and discussions with patient and family. Time out per checklist confirmed. Left infraclavicular area was prepared and draped. Local anesthesia was infiltrated with 1% Lidocaine. The previous incision was reopened and the pocket for the previous generator was entered.   The generator explanted the atrial lead disconnected  Attempts to reposition the atrial lead and different location were suboptimal  The previously present atrial lead which was a screw-in lead was then removed    Subclavian was then again accessed with a needle and guidewire systolic was introduced  A new tined atrial lead introduced    Under fluoroscopy control was floated into the right atrial appendage and after testing multiple locations we accept the location she had best thresholds as recorded  The new atrial lead was then anchored to the pectoralis fascia and connected to the previously existing generator    In lieu of the patient's history of infections and pneumonias and reentry we elected to use tyrex pouch  reduce the chance of a potential infection. The generator was placed in this pouch the pocket was expanded inferiorly and anchored to the pectoralis fascia     There was no diaphragmatic pacing at 10V on either lead. Wound was irrigated with antibiotic solution and closed in layers. The lead thresholds again rechecked to be unchanged  Fluoroscopy x-ray and AP and lateral was performed to confirm the position of the leads    Blood loss was minimal. Sponge and instrument count was correct before closure. Patient tolerated the procedure well.

## 2020-03-24 NOTE — PROGRESS NOTES
(THERATEARS) 1 % ophthalmic gel 1 drop, 1 drop, Both Eyes, Q2H PRN  sodium chloride flush 0.9 % injection 10 mL, 10 mL, Intravenous, 2 times per day  sodium chloride flush 0.9 % injection 10 mL, 10 mL, Intravenous, PRN  acetaminophen (TYLENOL) tablet 650 mg, 650 mg, Oral, Q4H PRN  sodium chloride flush 0.9 % injection 10 mL, 10 mL, Intravenous, BID  glucose (GLUTOSE) 40 % oral gel 15 g, 15 g, Oral, PRN  dextrose 50 % IV solution, 12.5 g, Intravenous, PRN  glucagon (rDNA) injection 1 mg, 1 mg, Intramuscular, PRN  dextrose 5 % solution, 100 mL/hr, Intravenous, PRN  sodium chloride flush 0.9 % injection 10 mL, 10 mL, Intravenous, 2 times per day  sodium chloride flush 0.9 % injection 10 mL, 10 mL, Intravenous, PRN  [DISCONTINUED] acetaminophen (TYLENOL) tablet 650 mg, 650 mg, Oral, Q6H PRN **OR** acetaminophen (TYLENOL) suppository 650 mg, 650 mg, Rectal, Q6H PRN  pantoprazole (PROTONIX) injection 40 mg, 40 mg, Intravenous, Daily  aspirin chewable tablet 81 mg, 81 mg, Oral, Daily  Allergies:  Patient has no known allergies. Social History:  TOBACCO:   reports that he has never smoked. His smokeless tobacco use includes snuff. ETOH:   reports current alcohol use. DRUGS:   reports no history of drug use. Family History:       Problem Relation Age of Onset    Coronary Art Dis Mother     Cancer Father         tongue    Diabetes Other         aunt       REVIEW OF SYSTEMS:  CONSTITUTIONAL:  negative  HEENT:  negative  RESPIRATORY:  negative  CARDIOVASCULAR:  negative  GASTROINTESTINAL:  negative  GENITOURINARY:  negative  MUSCULOSKELETAL:  negative  BEHAVIOR/PSYCH:  Negative    ROS unavailable    Family hx neg    PHYSICAL EXAM  ------------------------  Vitals:  BP (!) 142/69   Pulse 79   Temp 97.8 °F (36.6 °C) (Oral)   Resp 14   Ht (S) 5' 10\" (1.778 m)   Wt 195 lb 11.2 oz (88.8 kg)   SpO2 95%   BMI 28.08 kg/m²      General: drowsy  Well developed, well nourished, well groomed. No apparent distress.   HEENT: Normocephalic, atraumatic. Pupils equal, round, reactive to light. No scleral icterus. No conjunctival injection. Normal lips, teeth, and gums. No nasal discharge. Neck:  Supple  Heart:  RRR, no murmurs, gallops, rubs  Lungs:  CTA bilaterally, bilat symmetrical expansion, no wheeze, rales, or rhonchi  Abdomen:   Bowel sounds present, soft, nontender, no masses, no organomegaly, no peritoneal signs  Extremities:  No clubbing, cyanosis, or edema  Skin:  Warm and dry, no open lesions or rash  Breast: deferred  Rectal: deferred  Genitalia:  deferred    NEUROLOGICAL EXAM  ---------------------------------    Mental Status Exam:                Not Confused  follows commands  EOMI pupils 3 mm russell  5/5 russell UE LE  resp on own              CBC with Differential:    Lab Results   Component Value Date    WBC 11.2 03/23/2020    RBC 3.79 03/23/2020    HGB 11.0 03/23/2020    HCT 35.6 03/23/2020     03/23/2020    MCV 93.9 03/23/2020    MCH 29.0 03/23/2020    MCHC 30.9 03/23/2020    RDW 15.3 03/23/2020    SEGSPCT 68.0 03/15/2020    BANDSPCT 12 03/15/2020    LYMPHOPCT 11.0 03/15/2020    MONOPCT 9.0 03/15/2020    BASOPCT 1.1 03/14/2020    MONOSABS 0.7 03/15/2020    LYMPHSABS 0.8 03/15/2020    EOSABS 0.2 03/14/2020    BASOSABS 0.1 03/14/2020    DIFFTYPE MANUAL DIFFERENTIAL 03/15/2020     CMP:    Lab Results   Component Value Date     03/23/2020    K 3.3 03/23/2020     03/23/2020    CO2 25 03/23/2020    BUN 18 03/23/2020    CREATININE 1.0 03/23/2020    GFRAA >60 03/23/2020    AGRATIO 1.7 09/12/2019    LABGLOM >60 03/23/2020    GLUCOSE 100 03/23/2020    PROT 5.7 03/23/2020    LABALBU 3.3 03/23/2020    CALCIUM 8.5 03/23/2020    BILITOT 0.5 03/23/2020    ALKPHOS 86 03/23/2020    AST 46 03/23/2020    ALT 39 03/23/2020     BMP:    Lab Results   Component Value Date     03/23/2020    K 3.3 03/23/2020     03/23/2020    CO2 25 03/23/2020    BUN 18 03/23/2020    LABALBU 3.3 03/23/2020    CREATININE 1.0

## 2020-03-24 NOTE — CARE COORDINATION
LSW read that pt has a discharge order. The discharge plan is for home with [facility]. with [facility]. No HC was placed in chart. LSW PS Dr. Tim Ayon and requested a inpt HC order. Received HC order. LSW noticed that pt does not have a PCP. LSW spoke with pt and he stated he see the PA in Dr. Khadijah Sofia office. LSW called [facility] and informed them of discharge and faxed pt info to [facility].

## 2020-03-24 NOTE — PLAN OF CARE
Problem: Falls - Risk of:  Goal: Will remain free from falls  Description: Will remain free from falls  3/23/2020 2228 by Trish Tee RN  Outcome: Ongoing  3/23/2020 1052 by Don Oglesby RN  Outcome: Ongoing  Goal: Absence of physical injury  Description: Absence of physical injury  3/23/2020 2228 by Trish Tee RN  Outcome: Ongoing  3/23/2020 1052 by Don Oglesby RN  Outcome: Ongoing     Problem: Restraint Use - Nonviolent/Non-Self-Destructive Behavior:  Goal: Absence of restraint indications  Description: Absence of restraint indications  3/23/2020 2228 by Trish Tee RN  Outcome: Ongoing  3/23/2020 1052 by Don Oglesby RN  Outcome: Ongoing  Goal: Absence of restraint-related injury  Description: Absence of restraint-related injury  3/23/2020 2228 by Trish Tee RN  Outcome: Ongoing  3/23/2020 1052 by Don Oglesby RN  Outcome: Ongoing     Problem: Nutrition  Goal: Optimal nutrition therapy  3/23/2020 2228 by Trish Tee RN  Outcome: Ongoing  3/23/2020 1052 by Don Oglesby RN  Outcome: Ongoing     Problem: Pain:  Goal: Pain level will decrease  Description: Pain level will decrease  3/23/2020 2228 by Trish Tee RN  Outcome: Ongoing  3/23/2020 1052 by Don Oglesby RN  Outcome: Ongoing  Goal: Control of acute pain  Description: Control of acute pain  3/23/2020 2228 by Trish Tee RN  Outcome: Ongoing  3/23/2020 1052 by Don Oglesby RN  Outcome: Ongoing  Goal: Control of chronic pain  Description: Control of chronic pain  3/23/2020 2228 by Trish Tee RN  Outcome: Ongoing  3/23/2020 1052 by Don Oglesby RN  Outcome: Ongoing     Problem: Discharge Planning:  Goal: Participates in care planning  Description: Participates in care planning  3/23/2020 2228 by Trish Tee RN  Outcome: Ongoing  3/23/2020 1052 by Don Oglesby RN  Outcome: Ongoing  Goal: Discharged to appropriate level of care  Description: Discharged to appropriate

## 2020-03-25 ENCOUNTER — NURSE ONLY (OUTPATIENT)
Dept: CASE MANAGEMENT | Age: 55
End: 2020-03-25

## 2020-03-25 ENCOUNTER — CARE COORDINATION (OUTPATIENT)
Dept: CASE MANAGEMENT | Age: 55
End: 2020-03-25

## 2020-03-25 NOTE — DISCHARGE SUMMARY
Discharge Summary    Name:  Neal Oh /Age/Sex: 1965  (62 y.o. male)   MRN & CSN:  [id number] & [id number] Admission Date/Time: 3/8/2020  3:44 AM   Attending:  No att. providers found Discharging Physician: Ascencion Riedel, MD     Hospital Course:   Neal Oh is a 62 y.o.  male  who presents with Sepsis [facility])    This is a 60yo  male who was admitted on 20 with altered mental status, severe agitation, combativeness and was intubated and placed on vent. Patient was admitted for severe sepsis secondary to acute diverticulitis and strep pneumonia. Patient was on broad spectrum IV antibiotics. Patient was in a 2:1 block AV block and bradycardic and started on IV dopamine. 2decho/GINGER showed severe aortic regurgitation. Patient was s/p LHC on 20 for NSTEMI showing mild CAD. CT surgery was consulted and will need AVR when stable. There was extreme encephalopathy. MRI brain was negative. There was concern for meningitis so an LP was done. Patient was on antivirals. Neurology and ID were following. LP was not suggestive of bacterial meningitis. HSV was negative. Respiratory cultures grew Strep pneumonia. CT abd/pelvis showed diverticulitis and new bibasilar consolidations. Nephrology was consulted as well for JUSTIN from ATN and severe metabolic acidosis which resolved. His encephalopathy was likely multifactorial with sepsis, strep pna, etoh withdrawal and likely polysubstance abuse with positive amphetamines on UDS. On 3/19, patient had episode of torsade V. Fib which did not require cardioversion but was in CHB. A temp placer was placed and s/p PPM on 3/20. Patient needed repositioning of his atrial lead pacer revision on 3/23. Patient was extubated and had completed his antibiotics. Blood cultures had remained negative. ID was managing this.   Patient had some subconjunctival hemorrhage while on vent and evaluated by opthalmology and no further interventions needed. I had seen the patient for the first time on day of discharge. He was feeling much better and wanted to go home. He was fully alert and oriented x3. He is on RA. He denies any chest pain, sob, dizziness, lightheadedness, nausea, vomiting, abdominal pain, diarrhea. He needs an AVR but will be done as an outpatient. He has completed antibiotics. He has mild leukocytosis but is afebrile. ID signed off. Cardiology ok for d/c home. Patient is eating and ambulating well. He will be sent home with [facility], PT, OT, skilled nursing. The patient expressed appropriate understanding of and agreement with the discharge recommendations, medications, and plan. Consults this admission:  IP CONSULT TO HOSPITALIST  IP CONSULT TO PULMONOLOGY  IP CONSULT TO NEPHROLOGY  IP CONSULT TO CARDIOLOGY  PHARMACY TO DOSE VANCOMYCIN  IP CONSULT TO DIETITIAN  IP CONSULT TO NEUROLOGY  IP CONSULT TO RADIOLOGY  IP CONSULT TO INFECTIOUS DISEASES  IP CONSULT TO DIETITIAN  IP CONSULT TO OPHTHALMOLOGY  IP CONSULT TO INTERVENTIONAL RADIOLOGY  IP CONSULT TO [address]    Discharge Instruction:   Follow up appointments: with cardiology as scheduled.    Primary care physician:  within 2 weeks    Diet:  regular diet   Activity: activity as tolerated  Disposition: Discharged to:   [x]Home, [x][facility], []SNF, []Acute Rehab, []Hospice   Condition on discharge: Stable    Discharge Medications:      [facility] [address] [facility] Medication Instructions CLL:[id number]    Printed on:03/25/20 7797   Medication Information                      aspirin 81 MG chewable tablet  Take 1 tablet by mouth daily             benzonatate (TESSALON PERLES) 100 MG capsule  Take 1 capsule by mouth 3 times daily as needed for Cough             furosemide (LASIX) 20 MG tablet  Take 1 tablet by mouth daily             lisinopril (PRINIVIL;ZESTRIL) 10 MG tablet  Take 1 tablet by mouth daily             magnesium oxide (MAG-OX) 400 458*  --        IMAGING:  Pt had CT head, CTA chest, abd, CT abd/pelvis.    2decho, GINGER  EEG  MRI brain  LP by IR  Thoracentesis by IR    Discharge Time of 45 minutes    Electronically signed by Thomas Marcano MD on 3/25/2020 at 2:11 PM

## 2020-03-25 NOTE — CARE COORDINATION
[facility] Transitions Initial Follow Up Call    Call within 2 business days of discharge: Yes    Patient: Zulema Chamberlain Patient : 1965   MRN: [id number]  Reason for Admission: AMS  Discharge Date: 3/24/20 RARS: Readmission Risk Score: 20      Last Discharge [facility]       Complaint Diagnosis Description Type Department Provider    3/8/20 Altered Mental Status Delirium . .. ED to Hosp-Admission (Discharged) (ADMIT) Trino Simon MD; Sara Genao. .. Spoke with:   patient    Facility: [facility]            Follow Up:  COVID-19 Intial assessment follow up: Attempted to reach patient for Care Transition follow up. No answer to phone. Message left with CTN contact information and request for call back. Spoke with Poonam Henry. Confirmed services and plan for [facility] follow up. Call back received from patient's spouse. COVID-19 Screening Initial Follow-up Note    Patient contacted regarding COVID-19:  Risk  Care Transition Nurse contacted the patient by telephone to perform post discharge assessment. Verified name and  with patient as identifiers. Provided introduction to self, and explanation of the CTN role, and reason for call due to risk factors for infection and/or exposure to COVID-19. Patient spouse denies patient c/o fever, chills, cough, nausea, diarrhea, HA, tachypnea, tachycardia, dizziness or light headed ness. Reports that patient mentation is at baseline; appetite is good. Patient is sleeping well at night. Patient has following risk factors of: Sepsis/ Acute Resp. Failure with Hypoxia. CTN reviewed discharge instructions, medical action plan and red flags such as increased shortness of breath, increasing fever and signs of decompensation with patient spouse who verbalized understanding.    Discussed exposure protocols and quarantine with CDC Guidelines What to do if you are sick with coronavirus disease 2019 with patient spouse who was given an opportunity for questions and concerns. The patient spouse agrees to contact the [facility] exposure line [phone], local health department  and PCP office for questions related to their healthcare. CTN provided contact information for future reference. Reviewed and educated patient on any new and changed medications related to discharge diagnosis. Medication reconciliation completed. Patient spouse reports that patient is taking his medications as directed and denies any questions in r/r new prescriptions. Confirmed contact information for [facility]. Reminded of the 24/7 availability of a nurse on call for any change in patient condition. Plan for follow-up call in 5-7 days based on severity of symptoms and risk factors. No future appointments.     Jeral Gitelman, RN

## 2020-03-26 ENCOUNTER — CARE COORDINATION (OUTPATIENT)
Dept: CASE MANAGEMENT | Age: 55
End: 2020-03-26

## 2020-04-03 ENCOUNTER — CARE COORDINATION (OUTPATIENT)
Dept: CASE MANAGEMENT | Age: 55
End: 2020-04-03

## 2020-04-03 NOTE — CARE COORDINATION
[facility] Transitions Follow Up Call    4/3/2020    Patient: Kenney Canavan  Patient : 1965   MRN: [id number]  Reason for Admission: Sepsis, Ac Rep Failure s/p Vent, Ac Diverticulitis, Strept Pna, NSTEMI, JUSTIN, Alcohol and Polysubstance WD  Discharge Date: 3/24/20 RARS: Readmission Risk Score: 20    Care Transitions Subsequent and Final Call    Subsequent and Final Calls  Are you currently active with any services?:  Home Health  Care Transitions Interventions  Other Interventions:          COVID-19 RISK  Patient Risk Factors: Ac Resp Failure s/p Vent, Sepsis, Pna       Attempt to reach for CT/COVID Risk follow up call unsuccessful. No answers/Mailboxes full.      Jared Yap RN

## 2020-04-07 ENCOUNTER — CARE COORDINATION (OUTPATIENT)
Dept: CASE MANAGEMENT | Age: 55
End: 2020-04-07

## 2020-04-07 PROBLEM — Z95.0 S/P PLACEMENT OF CARDIAC PACEMAKER: Status: ACTIVE | Noted: 2020-04-07

## 2020-04-14 ENCOUNTER — CARE COORDINATION (OUTPATIENT)
Dept: CASE MANAGEMENT | Age: 55
End: 2020-04-14

## 2020-04-20 ENCOUNTER — TELEPHONE (OUTPATIENT)
Dept: FAMILY MEDICINE CLINIC | Age: 55
End: 2020-04-20

## 2020-04-21 ENCOUNTER — HOSPITAL ENCOUNTER (OUTPATIENT)
Dept: GENERAL RADIOLOGY | Age: 55
Discharge: HOME | End: 2020-04-21
Payer: COMMERCIAL

## 2020-04-21 ENCOUNTER — HOSPITAL ENCOUNTER (OUTPATIENT)
Age: 55
Discharge: HOME | End: 2020-04-21
Payer: COMMERCIAL

## 2020-04-21 ENCOUNTER — TELEPHONE (OUTPATIENT)
Dept: FAMILY MEDICINE CLINIC | Age: 55
End: 2020-04-21

## 2020-04-21 ENCOUNTER — TELEMEDICINE (OUTPATIENT)
Dept: FAMILY MEDICINE CLINIC | Age: 55
End: 2020-04-21
Payer: COMMERCIAL

## 2020-04-21 PROBLEM — F19.11 H/O: SUBSTANCE ABUSE (HCC): Status: ACTIVE | Noted: 2020-04-21

## 2020-04-21 PROBLEM — I35.1 SEVERE AORTIC REGURGITATION: Status: ACTIVE | Noted: 2020-04-21

## 2020-04-21 PROBLEM — A41.9 SEPSIS (HCC): Status: RESOLVED | Noted: 2020-03-08 | Resolved: 2020-04-21

## 2020-04-21 PROBLEM — Z86.79 H/O HEART BLOCK: Status: ACTIVE | Noted: 2020-04-21

## 2020-04-21 PROCEDURE — 71046 X-RAY EXAM CHEST 2 VIEWS: CPT

## 2020-04-21 PROCEDURE — 99215 OFFICE O/P EST HI 40 MIN: CPT | Performed by: PHYSICIAN ASSISTANT

## 2020-04-21 ASSESSMENT — ENCOUNTER SYMPTOMS
SHORTNESS OF BREATH: 0
CHEST TIGHTNESS: 0
VOMITING: 0
COUGH: 0
NAUSEA: 0
WHEEZING: 0
ABDOMINAL PAIN: 0
CONSTIPATION: 0
DIARRHEA: 0

## 2020-04-21 NOTE — TELEPHONE ENCOUNTER
I am not sure what patient means but anything we discussed with patient is confidential and insurance does not have access to patient's chart.

## 2020-04-21 NOTE — PROGRESS NOTES
he has followed up with cardiology. Cardiology is going to repeat echo after recovery from pacer to see if valve replacement is really needed. Echo is scheduled for 5/5/2020. He has also followed up with pulmonology. [facility] ordering repeat CXR to be done in a couple of weeks. No longer drinking ETOH. He did admit to substance abuse with stimulant such as Ritalin but he has since discontinued this practice parents    Has been compliant with doing PT and OT at home. Overall he is recovering well but he does note some increased anxiety. He has had diagnosis of anxiety for the past couple of years. We tried him on sertraline but he only took it for 2 weeks before discontinuing it and did not give it a fair trial.  He is unsure if he wants to restart treatment or do any counseling. I did discuss with him that the added stress of anxiety is not good for him especially with the condition that his heart is in currently. He was agreeable to restarting sertraline and giving it a fair try with proper tapering up on medication as needed. Needs clearance for work. He works as a  at [employer]. Is isolated and doesn't move around much. Has 20 foot radius around him so we would not need to worry about exposure from the coronavirus. Only concern is walking through the huge parking lot, states it is the size of a football field and before being admitted to the hospital was getting winded during this walk. He believes he may need a handicap placard. He is supposed to have an appointment later on today with cardiology about clearance to go back to work as well. His current expected return to work date is 5/6/2020. Review of Systems   Constitutional: Negative for chills and fever. Respiratory: Negative for cough, chest tightness, shortness of breath and wheezing. Cardiovascular: Negative for chest pain and leg swelling.    Gastrointestinal: Negative for abdominal pain, constipation, diarrhea, nausea and vomiting. Neurological: Negative for dizziness, syncope, light-headedness and headaches. Psychiatric/Behavioral: Negative for behavioral problems, dysphoric mood and sleep disturbance. The patient is nervous/anxious. Prior to Visit Medications    Medication Sig Taking? Authorizing Provider   sertraline (ZOLOFT) 50 MG tablet Take 1 tablet by mouth daily Yes Brittney Ahn PA-C   aspirin 81 MG chewable tablet Take 1 tablet by mouth daily  Roxana Echols MD   lisinopril (PRINIVIL;ZESTRIL) 10 MG tablet Take 1 tablet by mouth daily  Roxana Echols MD   metoprolol succinate (TOPROL XL) 50 MG extended release tablet Take 1 tablet by mouth daily  Roxana Echols MD   furosemide (LASIX) 20 MG tablet Take 1 tablet by mouth daily  Roxana Echols MD   magnesium oxide (MAG-OX) 400 (240 Mg) MG tablet Take 1 tablet by mouth 2 times daily  Roxana Echols MD   potassium chloride (KLOR-CON M) 20 MEQ extended release tablet Take 1 tablet by mouth daily  Amee Bassett MD       Social History     Tobacco Use    Smoking status: Never Smoker    Smokeless tobacco: Current User     Types: Snuff   Substance Use Topics    Alcohol use: Yes     Comment: DAILY    Drug use: Never            PHYSICAL EXAMINATION:  [ INSTRUCTIONS:  \"[x]\" Indicates a positive item  \"[]\" Indicates a negative item  -- DELETE ALL ITEMS NOT EXAMINED]  Vital Signs: (As obtained by patient/caregiver or practitioner observation)    Blood pressure- 117//69. Today was 135/70   Heart rate- 83-95.  Today was 93     Respiratory rate-      Temperature-    Pulse oximetry-   Weight- 189 lbs    Constitutional:   [x] Appears well-developed and well-nourished   [x] No apparent distress    [] Abnormal-     Mental status  [x] Alert and awake    [x] Oriented to person/place/time   [x]Able to follow commands      Eyes:  EOM    [x]  Normal  [] Abnormal-  Sclera  [x]  Normal  [] Abnormal -  Discharge []  None visible    [] Abnormal -    HENT:   [x] being a TeleHealth encounter (During XFBQK-19 public health emergency), evaluation of the following organ systems was limited: Vitals/Constitutional/EENT/Resp/CV/GI//MS/Neuro/Skin/Heme-Lymph-Imm. Pursuant to the emergency declaration under the [address] [place] [address], [address] [place] [address] and the [employer] and [employer] Act, this Virtual Visit was conducted with patient's (and/or legal guardian's) consent, to reduce the patient's risk of exposure to COVID-19 and provide necessary medical care. The patient (and/or legal guardian) has also been advised to contact this office for worsening conditions or problems, and seek emergency medical treatment and/or call 911 if deemed necessary. Services were provided through a video synchronous discussion virtually to substitute for in-person clinic visit. Patient and provider were located at their individual homes. --Cony Cristobal PA-C on 4/21/2020 at 10:38 AM    An electronic signature was used to authenticate this note.

## 2020-04-24 ENCOUNTER — TELEPHONE (OUTPATIENT)
Dept: FAMILY MEDICINE CLINIC | Age: 55
End: 2020-04-24

## 2020-04-28 NOTE — TELEPHONE ENCOUNTER
Got a hold of pt and he stated cardiologist was good with him going back to work and was going to see him in June .  Pt also wants a handicap plaquered for his car

## 2020-05-28 ENCOUNTER — TELEMEDICINE (OUTPATIENT)
Dept: FAMILY MEDICINE CLINIC | Age: 55
End: 2020-05-28
Payer: COMMERCIAL

## 2020-05-28 ENCOUNTER — TELEPHONE (OUTPATIENT)
Dept: FAMILY MEDICINE CLINIC | Age: 55
End: 2020-05-28

## 2020-05-28 PROCEDURE — 99214 OFFICE O/P EST MOD 30 MIN: CPT | Performed by: PHYSICIAN ASSISTANT

## 2020-05-28 ASSESSMENT — ENCOUNTER SYMPTOMS: SHORTNESS OF BREATH: 1

## 2020-05-28 NOTE — PROGRESS NOTES
for another 4 weeks and then we can reassess to see if dose needs adjusted. 2. Severe aortic regurgitation  3. S/P placement of cardiac pacemaker  Patient is still recovering well, he has struggled going back to work a little bit since he is working more than 40 hours a week and often x55 hours a week. This leaves him very drained and worn out by the end of the week. I think it is fair for us to put a limit on his hours a week to no more than 40 as patient is still recovering and still has the severe aortic regurgitation that has not yet been corrected. We also want to make sure we keep his stress levels manageable. 4. Substance abuse ([facility] [address]  Currently still abstaining, need to make sure we keep his anxiety and stress levels at a manageable level so he does not relapse. Return in about 4 weeks (around 6/25/2020). Latonya Bhatt is a 62 y.o. male being evaluated by a Virtual Visit (video visit) encounter to address concerns as mentioned above. A caregiver was present when appropriate. Due to this being a TeleHealth encounter (During RDDAY-82 public health emergency), evaluation of the following organ systems was limited: Vitals/Constitutional/EENT/Resp/CV/GI//MS/Neuro/Skin/Heme-Lymph-Imm. Pursuant to the emergency declaration under the [facility], [address] [place] [address] authority and the [employer] and [employer] Act, this Virtual Visit was conducted with patient's (and/or legal guardian's) consent, to reduce the patient's risk of exposure to COVID-19 and provide necessary medical care. The patient (and/or legal guardian) has also been advised to contact this office for worsening conditions or problems, and seek emergency medical treatment and/or call 911 if deemed necessary. Services were provided through a video synchronous discussion virtually to substitute for in-person clinic visit.  Patient and provider were located at

## 2020-06-11 ENCOUNTER — HOSPITAL ENCOUNTER (OUTPATIENT)
Age: 55
Setting detail: SPECIMEN
Discharge: HOME | End: 2020-06-11
Payer: COMMERCIAL

## 2020-06-11 ENCOUNTER — OFFICE VISIT (OUTPATIENT)
Dept: PRIMARY CARE CLINIC | Age: 55
End: 2020-06-11
Payer: COMMERCIAL

## 2020-06-11 VITALS — TEMPERATURE: 96.3 F | OXYGEN SATURATION: 99 % | HEART RATE: 84 BPM

## 2020-06-11 PROCEDURE — 99213 OFFICE O/P EST LOW 20 MIN: CPT | Performed by: NURSE PRACTITIONER

## 2020-06-11 PROCEDURE — U0002 COVID-19 LAB TEST NON-CDC: HCPCS

## 2020-06-11 NOTE — PATIENT INSTRUCTIONS
Increase fluids, rest.   Saline nasal spray as directed. Warm salt gargles for throat discomfort. Monitor temperature twice a day. Tylenol for fevers and/or discomfort. We will contact you with your Covid 19 test result. Self-isolate until you know your result. Follow up with PCP if no better. Patient Education        Learning About Coronavirus [phone])  Coronavirus [phone]): Overview  What is coronavirus (OLDEH-04)? The coronavirus disease (COVID-19) is caused by a virus. It is an illness that was first found in [place], [place], in December 2019. It has since spread worldwide. The virus can cause fever, cough, and trouble breathing. In severe cases, it can cause pneumonia and make it hard to breathe without help. It can cause death. Coronaviruses are a large group of viruses. They cause the common cold. They also cause more serious illnesses like Middle East respiratory syndrome (MERS) and severe acute respiratory syndrome (SARS). COVID-19 is caused by a novel coronavirus. That means it's a new type that has not been seen in people before. This virus spreads person-to-person through droplets from coughing and sneezing. It can also spread when you are close to someone who is infected. And it can spread when you touch something that has the virus on it, such as a doorknob or a tabletop. What can you do to protect yourself from coronavirus (COVID-19)? The best way to protect yourself from getting sick is to:  · Avoid areas where there is an outbreak. · Avoid contact with people who may be infected. · Wash your hands often with soap or alcohol-based hand sanitizers. · Avoid crowds and try to stay at least 6 feet away from other people. · Wash your hands often, especially after you cough or sneeze. Use soap and water, and scrub for at least 20 seconds. If soap and water aren't available, use an alcohol-based hand . · Avoid touching your mouth, nose, and eyes.   What can you do to avoid

## 2020-06-13 LAB
SARS-COV-2: NOT DETECTED
SOURCE: NORMAL

## 2020-06-15 ENCOUNTER — TELEMEDICINE (OUTPATIENT)
Dept: FAMILY MEDICINE CLINIC | Age: 55
End: 2020-06-15
Payer: COMMERCIAL

## 2020-06-15 ENCOUNTER — E-VISIT (OUTPATIENT)
Dept: FAMILY MEDICINE CLINIC | Age: 55
End: 2020-06-15

## 2020-06-15 ENCOUNTER — TELEPHONE (OUTPATIENT)
Dept: FAMILY MEDICINE CLINIC | Age: 55
End: 2020-06-15

## 2020-06-15 PROCEDURE — 99213 OFFICE O/P EST LOW 20 MIN: CPT | Performed by: FAMILY MEDICINE

## 2020-06-15 RX ORDER — LANOLIN ALCOHOL/MO/W.PET/CERES
400 CREAM (GRAM) TOPICAL 2 TIMES DAILY
Qty: 30 TABLET | Refills: 5 | Status: SHIPPED | OUTPATIENT
Start: 2020-06-15 | End: 2021-03-22 | Stop reason: SDUPTHER

## 2020-06-15 RX ORDER — LISINOPRIL 10 MG/1
10 TABLET ORAL 2 TIMES DAILY
Qty: 60 TABLET | Refills: 5 | Status: SHIPPED | OUTPATIENT
Start: 2020-06-15

## 2020-06-15 RX ORDER — ESCITALOPRAM OXALATE 10 MG/1
10 TABLET ORAL DAILY
Qty: 30 TABLET | Refills: 3 | Status: SHIPPED | OUTPATIENT
Start: 2020-06-15 | End: 2020-11-02 | Stop reason: SDUPTHER

## 2020-06-15 RX ORDER — POTASSIUM CHLORIDE 20 MEQ/1
20 TABLET, EXTENDED RELEASE ORAL DAILY
Qty: 60 TABLET | Refills: 5 | Status: SHIPPED | OUTPATIENT
Start: 2020-06-15 | End: 2021-03-22 | Stop reason: SDUPTHER

## 2020-06-15 RX ORDER — METOPROLOL SUCCINATE 50 MG/1
50 TABLET, EXTENDED RELEASE ORAL DAILY
Qty: 30 TABLET | Refills: 5 | Status: SHIPPED | OUTPATIENT
Start: 2020-06-15 | End: 2021-03-04 | Stop reason: SDUPTHER

## 2020-06-15 RX ORDER — FUROSEMIDE 20 MG/1
20 TABLET ORAL DAILY
Qty: 60 TABLET | Refills: 3 | Status: SHIPPED | OUTPATIENT
Start: 2020-06-15 | End: 2021-03-22 | Stop reason: SDUPTHER

## 2020-06-15 NOTE — PROGRESS NOTES
Callum Oropeza is a 62 y.o. male evaluated via telephone on 6/15/2020. Consent:  He and/or health care decision maker is aware that that he may receive a bill for this telephone service, depending on his insurance coverage, and has provided verbal consent to proceed: Yes      Documentation:  I communicated with the patient and/or health care decision maker about  recent issues.    Details of this discussion including any medical advice provided: The patient has been quite ill in March of this year and was hospitalized in the ICU was on a ventilator  I reviewed his hospital reports and appears the patient was septic from diverticulitis or UTI  The patient was weaned from the ventilator he stabilized on antibiotics he had a heart of heart block and pacemaker was placed  The patient has returned to work  This week he complains about nausea  He had been started on sertraline about 2 weeks ago at a half of a 50 mg tablet just increase it to 50 and that seem to coincide with the nausea  He has any vomiting  He has a lot of anxiety and panic disorder  He is not been in on any other SSRIs  The diverticulitis appears to have resolved  He had Covid 19 testing because he had some nausea vomiting went to a clinic and this was ruled out  No one else in his household is ill  No one in the workplace  The pacemaker was inserted without incident  He has a history of valvular heart disease which is stable at this time    He said his lisinopril dose was increased to double by cardiology is questioning if the nausea could be from that  I reviewed his entire hospital record from earlier this year  Looked at his cardiology office note done today  Reconciled his medications    A- I think the nausea could be from the full dose SSRI  Otherwise he appears stable  p-stop the sertraline and start Lexapro 10 mg daily tomorrow  Follow-up in 4 weeks    Continue to follow-up with cardiology and other consultants  I affirm this is a Patient Initiated Episode with a Patient who has not had a related appointment within my department in the past 7 days or scheduled within the next 24 hours.     Patient identification was verified at the start of the visit: Yes    Total Time: minutes: 11-20 minutes    Note: not billable if this call serves to triage the patient into an appointment for the relevant concern      Abhay Mariee

## 2020-06-16 ENCOUNTER — TELEPHONE (OUTPATIENT)
Dept: FAMILY MEDICINE CLINIC | Age: 55
End: 2020-06-16

## 2020-07-06 ENCOUNTER — TELEMEDICINE (OUTPATIENT)
Dept: FAMILY MEDICINE CLINIC | Age: 55
End: 2020-07-06
Payer: COMMERCIAL

## 2020-07-06 PROCEDURE — 99213 OFFICE O/P EST LOW 20 MIN: CPT | Performed by: PHYSICIAN ASSISTANT

## 2020-07-07 NOTE — PROGRESS NOTES
2020    TELEHEALTH EVALUATION -- Audio/Visual (During EHUKI-15 public health emergency)    HPI:    Kary Farias (:  1965) has requested an audio/video evaluation for the following concern(s):    Patient following up from his visit with Dr. Anya Ferrell 3 weeks ago. He was still having nausea from the sertraline so he was switched to Lexapro. Since switching to Lexapro he notes that the nausea has resolved and the anxiety actually seems to be improved. He seems to be having less panic and feelings of high stress and anxiety overall. He did know that there was an episode that occurred when he was walking into the [facility] where he had this chest discomfort. He felt like it was more related to anxiety as he felt stressed about going into the public location during the pandemic. He did however feel better with rest and some deep breathing exercises. He has not reported this episode yet to his cardiologist.  He has not had a recurrence of the symptoms since. He denies other questions or concerns. Review of Systems   Constitutional: Negative for activity change, appetite change and fever. Psychiatric/Behavioral: Negative for dysphoric mood and sleep disturbance. The patient is nervous/anxious. Prior to Visit Medications    Medication Sig Taking?  Authorizing Provider   lisinopril (PRINIVIL;ZESTRIL) 10 MG tablet Take 1 tablet by mouth 2 times daily  Anitra Banerjee MD   metoprolol succinate (TOPROL XL) 50 MG extended release tablet Take 1 tablet by mouth daily  Anitra Banerjee MD   furosemide (LASIX) 20 MG tablet Take 1 tablet by mouth daily  Anitra Banerjee MD   magnesium oxide (MAG-OX) 400 (240 Mg) MG tablet Take 1 tablet by mouth 2 times daily  Anitra Banerjee MD   potassium chloride (KLOR-CON M) 20 MEQ extended release tablet Take 1 tablet by mouth daily  Anitra Banerjee MD   escitalopram (LEXAPRO) 10 MG tablet Take 1 tablet by mouth daily  Anitra Banerjee MD is only been on the Lexapro for 2-1/2 to 3 weeks. We will have patient do a couple more weeks of treatment before we reassess to see if treatment needs augmented at all. Patient will notify us by phone in a couple of weeks how he is feeling on the Lexapro and if he needs something in addition to this to help with anxiety or if we need to increase his dose. No follow-ups on file. Pema Castaneda is a 62 y.o. male being evaluated by a Virtual Visit (video visit) encounter to address concerns as mentioned above. A caregiver was present when appropriate. Due to this being a TeleHealth encounter (During CJEKW-11 public health emergency), evaluation of the following organ systems was limited: Vitals/Constitutional/EENT/Resp/CV/GI//MS/Neuro/Skin/Heme-Lymph-Imm. Pursuant to the emergency declaration under the [address] [place] [address], [address] [place] [address] and the [employer] and [employer] Act, this Virtual Visit was conducted with patient's (and/or legal guardian's) consent, to reduce the patient's risk of exposure to COVID-19 and provide necessary medical care. The patient (and/or legal guardian) has also been advised to contact this office for worsening conditions or problems, and seek emergency medical treatment and/or call 911 if deemed necessary. Services were provided through a video synchronous discussion virtually to substitute for in-person clinic visit. Patient and provider were located at their individual homes. --Rock Jabier PA-C on 7/7/2020 at 7:13 AM    An electronic signature was used to authenticate this note.

## 2020-11-02 RX ORDER — ESCITALOPRAM OXALATE 10 MG/1
10 TABLET ORAL DAILY
Qty: 30 TABLET | Refills: 3 | Status: SHIPPED | OUTPATIENT
Start: 2020-11-02 | End: 2021-03-04 | Stop reason: SDUPTHER

## 2021-03-03 RX ORDER — ESCITALOPRAM OXALATE 10 MG/1
10 TABLET ORAL DAILY
Qty: 30 TABLET | Refills: 3 | OUTPATIENT
Start: 2021-03-03

## 2021-03-03 RX ORDER — METOPROLOL SUCCINATE 50 MG/1
50 TABLET, EXTENDED RELEASE ORAL DAILY
Qty: 30 TABLET | Refills: 5 | OUTPATIENT
Start: 2021-03-03

## 2021-03-04 RX ORDER — METOPROLOL SUCCINATE 50 MG/1
50 TABLET, EXTENDED RELEASE ORAL DAILY
Qty: 30 TABLET | Refills: 0 | Status: SHIPPED | OUTPATIENT
Start: 2021-03-04 | End: 2021-03-22 | Stop reason: SDUPTHER

## 2021-03-04 RX ORDER — ESCITALOPRAM OXALATE 10 MG/1
10 TABLET ORAL DAILY
Qty: 30 TABLET | Refills: 0 | Status: SHIPPED | OUTPATIENT
Start: 2021-03-04 | End: 2021-03-22 | Stop reason: SDUPTHER

## 2021-03-22 ENCOUNTER — TELEMEDICINE (OUTPATIENT)
Dept: FAMILY MEDICINE CLINIC | Age: 56
End: 2021-03-22
Payer: COMMERCIAL

## 2021-03-22 DIAGNOSIS — G47.33 OSA (OBSTRUCTIVE SLEEP APNEA): ICD-10-CM

## 2021-03-22 DIAGNOSIS — R73.9 HYPERGLYCEMIA: ICD-10-CM

## 2021-03-22 DIAGNOSIS — Z95.0 S/P PLACEMENT OF CARDIAC PACEMAKER: ICD-10-CM

## 2021-03-22 DIAGNOSIS — F41.9 ANXIETY: Primary | ICD-10-CM

## 2021-03-22 DIAGNOSIS — J41.0 SIMPLE CHRONIC BRONCHITIS (HCC): ICD-10-CM

## 2021-03-22 DIAGNOSIS — I35.1 SEVERE AORTIC REGURGITATION: ICD-10-CM

## 2021-03-22 PROCEDURE — 99214 OFFICE O/P EST MOD 30 MIN: CPT | Performed by: PHYSICIAN ASSISTANT

## 2021-03-22 RX ORDER — ESCITALOPRAM OXALATE 20 MG/1
20 TABLET ORAL DAILY
Qty: 60 TABLET | Refills: 5 | Status: SHIPPED | OUTPATIENT
Start: 2021-03-22 | End: 2022-02-07 | Stop reason: SDUPTHER

## 2021-03-22 RX ORDER — MAGNESIUM OXIDE 400 MG/1
1 TABLET ORAL 2 TIMES DAILY
COMMUNITY
Start: 2021-02-22

## 2021-03-22 RX ORDER — POTASSIUM CHLORIDE 20 MEQ/1
20 TABLET, EXTENDED RELEASE ORAL DAILY
Qty: 60 TABLET | Refills: 5 | Status: SHIPPED | OUTPATIENT
Start: 2021-03-22

## 2021-03-22 RX ORDER — METOPROLOL SUCCINATE 50 MG/1
50 TABLET, EXTENDED RELEASE ORAL DAILY
Qty: 30 TABLET | Refills: 0 | Status: SHIPPED | OUTPATIENT
Start: 2021-03-22 | End: 2022-02-07 | Stop reason: SDUPTHER

## 2021-03-22 RX ORDER — FUROSEMIDE 20 MG/1
20 TABLET ORAL DAILY
Qty: 60 TABLET | Refills: 3 | Status: SHIPPED | OUTPATIENT
Start: 2021-03-22 | End: 2022-02-07 | Stop reason: SDUPTHER

## 2021-03-22 NOTE — PROGRESS NOTES
3/22/2021    TELEHEALTH EVALUATION -- Audio/Visual (During HSTZG-73 public health emergency)    HPI:    Nannette Stearns (:  1965) has requested an audio/video evaluation for the following concern(s):    Patient with history of anxiety which is doing better on the Lexapro but notes that he does not have full resolution of the anxiety and still feels quick tempered. He is interested in trying a higher dose of his Lexapro. Has history of hyperglycemia and is due for repeat blood work. Has history of heart block and now has pacemaker as well as severe aortic regurgitation which is currently managed with medication but patient is following up with his cardiologist this week on 3/24/21. He does monitor his blood pressures at home and states that they are usually around 1 55-2 25 systolic. He is seeing pulmonology for obstructive sleep apnea and simple chronic bronchitis. He was on Bevespi which helped but could not afford the medication so has not been using it recently. Denies any other questions or concerns      Prior to Visit Medications    Medication Sig Taking?  Authorizing Provider   escitalopram (LEXAPRO) 20 MG tablet Take 1 tablet by mouth daily Yes Sara Armenta PA-C   furosemide (LASIX) 20 MG tablet Take 1 tablet by mouth daily Yes Sara Armenta PA-C   metoprolol succinate (TOPROL XL) 50 MG extended release tablet Take 1 tablet by mouth daily Yes Sara Armenta PA-C   potassium chloride (KLOR-CON M) 20 MEQ extended release tablet Take 1 tablet by mouth daily Yes Sara Armenta PA-C   magnesium oxide (MAG-OX) 400 MG tablet Take 1 tablet by mouth 2 times daily  Sherwin Fraire MD   glycopyrrolate-formoterol (BEVESPI AEROSPHERE) 9-4.8 MCG/ACT AERO Inhale 2 puffs into the lungs 2 times daily  Chika Palma MD   Spacer/Aero-Holding Chambers (AEROCHAMBER MV) MISC 1 actuation by Does not apply route 2 times daily  Chika Palma MD   lisinopril (PRINIVIL;ZESTRIL) 10 MG tablet Take 1 tablet by mouth 2 times daily  Angélica Hammer MD       Social History     Tobacco Use    Smoking status: Never Smoker    Smokeless tobacco: Current User     Types: Snuff   Substance Use Topics    Alcohol use: Yes     Comment: DAILY    Drug use: Never            PHYSICAL EXAMINATION:  [ INSTRUCTIONS:  \"[x]\" Indicates a positive item  \"[]\" Indicates a negative item  -- DELETE ALL ITEMS NOT EXAMINED]  Vital Signs: (As obtained by patient/caregiver or practitioner observation)    Blood pressure- 140/71   Heart rate- 89     Respiratory rate-      Temperature-    Pulse oximetry-     Constitutional:   [x] Appears well-developed and well-nourished   [x] No apparent distress    [] Abnormal-     Mental status  [x] Alert and awake    [x] Oriented to person/place/time   [x]Able to follow commands      Eyes:  EOM    [x]  Normal  [] Abnormal-  Sclera  [x]  Normal  [] Abnormal -  Discharge [x]  None visible    [] Abnormal -    HENT:   [x] Normocephalic, atraumatic. [x] Mouth/Throat: Mucous membranes are moist.    [] Abnormal     External Ears   [x] Normal    [] Abnormal-     Neck:   [x] No visualized mass     Pulmonary/Chest:   [x] Respiratory effort normal.    [x] No visualized signs of difficulty breathing or respiratory distress        [] Abnormal-      Musculoskeletal:     [] Normal gait with no signs of ataxia   [x] Normal range of motion of neck        [] Abnormal-       Neurological:          [x] No Facial Asymmetry (Cranial nerve 7 motor function) (limited exam to video visit)     [x] No gaze palsy        [] Abnormal-         Skin:         [x] No significant exanthematous lesions or discoloration noted on facial skin         [] Abnormal-            Psychiatric:         [x] Normal Affect   [x] No Hallucinations        [] Abnormal-     Other pertinent observable physical exam findings-       ASSESSMENT/PLAN:  1. Anxiety  Try increasing Lexapro to 20 mg daily and follow-up in 6 weeks.   Return fasting for Vitals/Constitutional/EENT/Resp/CV/GI//MS/Neuro/Skin/Heme-Lymph-Imm. Pursuant to the emergency declaration under the [address] [place] [address] and the [employer] and [employer] Act, this Virtual Visit was conducted with patient's (and/or legal guardian's) consent, to reduce the patient's risk of exposure to COVID-19 and provide necessary medical care. The patient (and/or legal guardian) has also been advised to contact this office for worsening conditions or problems, and seek emergency medical treatment and/or call 911 if deemed necessary. Services were provided through a video synchronous discussion virtually to substitute for in-person clinic visit. Patient and provider were located at their individual homes. --Doretha Perez PA-C on 3/22/2021 at 5:18 PM    An electronic signature was used to authenticate this note.

## 2021-09-16 ENCOUNTER — APPOINTMENT (OUTPATIENT)
Dept: GENERAL RADIOLOGY | Age: 56
End: 2021-09-16
Payer: COMMERCIAL

## 2021-09-16 ENCOUNTER — HOSPITAL ENCOUNTER (EMERGENCY)
Age: 56
Discharge: HOME | End: 2021-09-16
Attending: EMERGENCY MEDICINE
Payer: COMMERCIAL

## 2021-09-16 VITALS
RESPIRATION RATE: 18 BRPM | TEMPERATURE: 96.4 F | WEIGHT: 220 LBS | HEART RATE: 98 BPM | OXYGEN SATURATION: 95 % | BODY MASS INDEX: 31.5 KG/M2 | SYSTOLIC BLOOD PRESSURE: 181 MMHG | HEIGHT: 70 IN | DIASTOLIC BLOOD PRESSURE: 70 MMHG

## 2021-09-16 DIAGNOSIS — S60.551A FOREIGN BODY OF RIGHT HAND, INITIAL ENCOUNTER: Primary | ICD-10-CM

## 2021-09-16 PROCEDURE — 6360000002 HC RX W HCPCS: Performed by: EMERGENCY MEDICINE

## 2021-09-16 PROCEDURE — 99283 EMERGENCY DEPT VISIT LOW MDM: CPT

## 2021-09-16 PROCEDURE — 90471 IMMUNIZATION ADMIN: CPT | Performed by: EMERGENCY MEDICINE

## 2021-09-16 PROCEDURE — 6370000000 HC RX 637 (ALT 250 FOR IP): Performed by: EMERGENCY MEDICINE

## 2021-09-16 PROCEDURE — 90715 TDAP VACCINE 7 YRS/> IM: CPT | Performed by: EMERGENCY MEDICINE

## 2021-09-16 PROCEDURE — 73130 X-RAY EXAM OF HAND: CPT

## 2021-09-16 RX ORDER — DIAPER,BRIEF,INFANT-TODD,DISP
EACH MISCELLANEOUS ONCE
Status: DISCONTINUED | OUTPATIENT
Start: 2021-09-16 | End: 2021-09-16 | Stop reason: HOSPADM

## 2021-09-16 RX ORDER — AMOXICILLIN AND CLAVULANATE POTASSIUM 875; 125 MG/1; MG/1
1 TABLET, FILM COATED ORAL 2 TIMES DAILY
Qty: 10 TABLET | Refills: 0 | Status: SHIPPED | OUTPATIENT
Start: 2021-09-16 | End: 2021-09-21

## 2021-09-16 RX ORDER — AMOXICILLIN AND CLAVULANATE POTASSIUM 875; 125 MG/1; MG/1
1 TABLET, FILM COATED ORAL ONCE
Status: COMPLETED | OUTPATIENT
Start: 2021-09-16 | End: 2021-09-16

## 2021-09-16 RX ADMIN — AMOXICILLIN AND CLAVULANATE POTASSIUM 1 TABLET: 875; 125 TABLET, FILM COATED ORAL at 16:35

## 2021-09-16 RX ADMIN — TETANUS TOXOID, REDUCED DIPHTHERIA TOXOID AND ACELLULAR PERTUSSIS VACCINE, ADSORBED 0.5 ML: 5; 2.5; 8; 8; 2.5 SUSPENSION INTRAMUSCULAR at 16:34

## 2021-09-16 NOTE — ED PROVIDER NOTES
[id number]  [facility]  EMERGENCYDEPARTMENT ENCOUNTER      Pt Name: Andrea Costa  MRN: [id number]  [place] 1965of evaluation: 9/16/2021  Provider:Higinio Merritt MD    CHIEF COMPLAINT       Chief Complaint   Patient presents with    Foreign Body     RIGHT HAND         HISTORY OF PRESENT ILLNESS    Andrea Costa is a 60 y.o. male who presents to the emergency department with foreign body right hand. Patient was using a chisel, had a duct taped handle and accidentally impaled his hand on the handle. The handle is still in his hand. Patient is in mild amount of pain. Minimal bleeding. Not sure of his last tetanus. No other symptoms. Nursing Notes were reviewed. REVIEW OF SYSTEMS       Review of Systems    10 point review of systems was performed and was negative exceptas specifically noted in the HPI.       PAST MEDICAL HISTORY     Past Medical History:   Diagnosis Date    Acute respiratory failure with hypoxia ([address]     Anxiety     Delirium     Metabolic encephalopathy     Sepsis ([facility] [address] 3/8/2020         SURGICAL HISTORY       Past Surgical History:   Procedure Laterality Date    PACEMAKER INSERTION  2020    PACEMAKER INSERTION N/A 3/20/2020    PACEMAKER INSERTION PERMANENT performed by Moncho Leyva MD at [address] [place] [address] N/A 3/23/2020    Repositioning of atrial lead pacer revision performed by Moncho Leyva MD at [facility]       Previous Medications    ESCITALOPRAM (LEXAPRO) 20 MG TABLET    Take 1 tablet by mouth daily    FUROSEMIDE (LASIX) 20 MG TABLET    Take 1 tablet by mouth daily    GLYCOPYRROLATE-FORMOTEROL (BEVESPI AEROSPHERE) 9-4.8 MCG/ACT AERO    Inhale 2 puffs into the lungs 2 times daily    LISINOPRIL (PRINIVIL;ZESTRIL) 10 MG TABLET    Take 1 tablet by mouth 2 times daily    MAGNESIUM OXIDE (MAG-OX) 400 MG TABLET    Take 1 tablet by mouth 2 times daily    METOPROLOL SUCCINATE (TOPROL XL) 50 MG EXTENDED RELEASE TABLET    Take 1 tablet by mouth daily    POTASSIUM CHLORIDE (KLOR-CON M) 20 MEQ EXTENDED RELEASE TABLET    Take 1 tablet by mouth daily    SPACER/AERO-HOLDING CHAMBERS (AEROCHAMBER MV) MISC    1 actuation by Does not apply route 2 times daily       ALLERGIES     Patient has no known allergies. FAMILY HISTORY       Family History   Problem Relation Age of Onset    Coronary Art Dis Mother     Cancer Father         tongue    Diabetes Other         aunt          SOCIAL HISTORY       Social History     Socioeconomic History    Marital status:      Spouse name: None    Number of children: None    Years of education: None    Highest education level: None   Occupational History    None   Tobacco Use    Smoking status: Never Smoker    Smokeless tobacco: Current User     Types: Snuff   Substance and Sexual Activity    Alcohol use: Yes     Comment: TWICE WEEKLY    Drug use: Never    Sexual activity: None   Other Topics Concern    None   Social History Narrative    None     Social Determinants of Health     Financial Resource Strain:     Difficulty of Paying Living Expenses:    Food Insecurity:     Worried About Running Out of Food in the Last Year:     Ran Out of Food in the Last Year:    Transportation Needs:     Lack of Transportation (Medical):      Lack of Transportation (Non-Medical):    Physical Activity:     Days of Exercise per Week:     Minutes of Exercise per Session:    Stress:     Feeling of Stress :    Social Connections:     Frequency of Communication with Friends and Family:     Frequency of Social Gatherings with Friends and Family:     Attends Anabaptist Services:     Active Member of Clubs or Organizations:     Attends Club or Organization Meetings:     Marital Status:    Intimate Partner Violence:     Fear of Current or Ex-Partner:     Emotionally Abused:     Physically Abused:     Sexually Abused:        [facility]       ED Triage Vitals [09/16/21 1553]   BP Temp Temp Source Pulse Resp SpO2 Height Weight   (!) 181/70 96.4 °F (35.8 °C) Infrared 98 18 95 % 5' 10\" (1.778 m) 220 lb (99.8 kg)       Physical Exam  General appearance: Alert, cooperative, no distress, appears stated age. Head:  Normocephalic, without obvious abnormality, atraumatic. HEENT: Mucous membranes moist.  Neck: Full ROM, trachea midline, no JVD  Lungs: No respiratory distress  Cardiovasular: Perfusing extremities  Abdomen: Nontender, no guarding  Extremities: There is a large foreign body sticking out of the patient's right hand. Able to move all fingers, capillary refill less than 3 seconds, normal sensation and motor. Skin: No rashes or lesions to exposed skin  Neurologic: Alert and oriented x3, motor grossly normal, clear speech      DIAGNOSTIC RESULTS     EKG:     IMAGING:   Non-plain film images such as CT, Ultrasound and MRI are read by the radiologist.Plain radiographic images are visualized and preliminarily interpreted by the emergency physician with the below findings:    No foreign body. Interpretation per the Radiologist below, if available at the time of this note:    XR HAND RIGHT (MIN 3 VIEWS)   Final Result   No fracture or dislocation. Soft tissue swelling. No definite radiopaque   foreign body. EDBEDSIDE ULTRASOUND:   Performed by Naseem Jaeger - none    LABS:  Labs Reviewed - No data to display    All other labs were within normal range or not returned as of this dictation.     EMERGENCY DEPARTMENT COURSE and DIFFERENTIAL DIAGNOSIS/MDM:   Vitals:    Vitals:    09/16/21 1553   BP: (!) 181/70   Pulse: 98   Resp: 18   Temp: 96.4 °F (35.8 °C)   TempSrc: Infrared   SpO2: 95%   Weight: 220 lb (99.8 kg)   Height: 5' 10\" (1.778 m)       Medications   bacitracin zinc ointment (has no administration in time range)   Tetanus-Diphth-Acell Pertussis (BOOSTRIX) injection 0.5 mL (0.5 mLs IntraMUSCular Given 9/16/21 1634)   amoxicillin-clavulanate (AUGMENTIN) 875-125 MG per tablet 1 tablet (1 tablet Oral Given 9/16/21 2299)       MDM     Patient presents with shows obvious fb, normal circulation and innervation of the right hand. I removed the foreign body, minimal bleeding following this. X-ray shows no foreign body. Updated patient's tetanus. We will have him on Augmentin for 5 days, given the first dose here. We will leave to heal by secondary intention due to contaminated wound. Will discharge patient home w 5 days augmentin. REASSESSMENT          CRITICAL CARE TIME   Critical Care time was 0 minutes, excluding separately reportable procedures. There was a high probability of clinically significant/life threatening deteriorationin the patient's condition which required my urgent intervention. CONSULTS:  None     PROCEDURES:  Unless otherwise noted below, none     Procedures    FINAL IMPRESSION      1.  Foreign body of right hand, initial encounter          DISPOSITION/PLAN   DISPOSITION Decision To Discharge 09/16/2021 05:56:47 PM      PATIENT REFERRED TO:  [address] [place] [address] [address]  [phone]    If symptoms worsen    Kenyatta Hung MD  [place] [address]  [phone]    Schedule an appointment as soon as possible for a visit         DISCHARGE MEDICATIONS:  New Prescriptions    AMOXICILLIN-CLAVULANATE (AUGMENTIN) 875-125 MG PER TABLET    Take 1 tablet by mouth 2 times daily for 5 days          (Please note that portions of this note were completed with a voicerecognition program.  Efforts were made to edit the dictations but occasionally words are mis-transcribed.)    Vj Colbert MD (electronically signed)  Attending Emergency Physician           Vj Colbert MD  09/16/21 1800       Vj Colbert MD  09/16/21 9466

## 2021-09-16 NOTE — ED NOTES
Discharge instructions and prescriptions given, pt expressed understanding of information and follow up care.       Casie Bojorquez, TARIQ  09/16/21 0655

## 2022-01-31 ENCOUNTER — PATIENT MESSAGE (OUTPATIENT)
Dept: FAMILY MEDICINE CLINIC | Age: 57
End: 2022-01-31

## 2022-01-31 DIAGNOSIS — F41.9 ANXIETY: ICD-10-CM

## 2022-01-31 DIAGNOSIS — Z95.0 S/P PLACEMENT OF CARDIAC PACEMAKER: ICD-10-CM

## 2022-01-31 DIAGNOSIS — I35.1 SEVERE AORTIC REGURGITATION: ICD-10-CM

## 2022-02-07 RX ORDER — ESCITALOPRAM OXALATE 20 MG/1
20 TABLET ORAL DAILY
Qty: 60 TABLET | Refills: 0 | Status: SHIPPED | OUTPATIENT
Start: 2022-02-07

## 2022-02-07 RX ORDER — FUROSEMIDE 20 MG/1
20 TABLET ORAL DAILY
Qty: 60 TABLET | Refills: 0 | Status: SHIPPED | OUTPATIENT
Start: 2022-02-07

## 2022-02-07 RX ORDER — METOPROLOL SUCCINATE 50 MG/1
50 TABLET, EXTENDED RELEASE ORAL DAILY
Qty: 30 TABLET | Refills: 0 | Status: SHIPPED | OUTPATIENT
Start: 2022-02-07

## 2022-02-07 NOTE — TELEPHONE ENCOUNTER
From: Kaylene Crockett  Sent: 2/4/2022 8:53 PM EST  To: [facility] Clinical Staff  Subject: Refill on [id number]    Yes i have been out for a few days now   I can make an appt. But like i said it will at least be after the 15th of month  Its [facility] pharmacy for now but Neva Harris [facility]   See what you can do for the lexapro or something similar on your end i may have something i can use till then. I will call for an appt on Tuesday.

## 2022-04-04 DIAGNOSIS — I35.1 SEVERE AORTIC REGURGITATION: ICD-10-CM

## 2022-04-04 DIAGNOSIS — Z95.0 S/P PLACEMENT OF CARDIAC PACEMAKER: ICD-10-CM

## 2022-04-04 RX ORDER — METOPROLOL SUCCINATE 50 MG/1
50 TABLET, EXTENDED RELEASE ORAL DAILY
Qty: 30 TABLET | Refills: 0 | OUTPATIENT
Start: 2022-04-04

## 2023-04-17 ENCOUNTER — OFFICE VISIT (OUTPATIENT)
Dept: FAMILY MEDICINE CLINIC | Age: 58
End: 2023-04-17
Payer: COMMERCIAL

## 2023-04-17 VITALS
BODY MASS INDEX: 30.49 KG/M2 | SYSTOLIC BLOOD PRESSURE: 138 MMHG | WEIGHT: 213 LBS | RESPIRATION RATE: 16 BRPM | HEIGHT: 70 IN | HEART RATE: 75 BPM | DIASTOLIC BLOOD PRESSURE: 78 MMHG

## 2023-04-17 DIAGNOSIS — R73.9 HYPERGLYCEMIA: ICD-10-CM

## 2023-04-17 DIAGNOSIS — I35.1 SEVERE AORTIC REGURGITATION: ICD-10-CM

## 2023-04-17 DIAGNOSIS — R06.02 SOB (SHORTNESS OF BREATH) ON EXERTION: Primary | ICD-10-CM

## 2023-04-17 DIAGNOSIS — J41.0 SIMPLE CHRONIC BRONCHITIS (HCC): ICD-10-CM

## 2023-04-17 DIAGNOSIS — E66.09 CLASS 1 OBESITY DUE TO EXCESS CALORIES WITH SERIOUS COMORBIDITY AND BODY MASS INDEX (BMI) OF 30.0 TO 30.9 IN ADULT: ICD-10-CM

## 2023-04-17 DIAGNOSIS — Z95.0 S/P PLACEMENT OF CARDIAC PACEMAKER: ICD-10-CM

## 2023-04-17 DIAGNOSIS — G47.33 OSA (OBSTRUCTIVE SLEEP APNEA): ICD-10-CM

## 2023-04-17 DIAGNOSIS — Z86.79 H/O HEART BLOCK: ICD-10-CM

## 2023-04-17 DIAGNOSIS — I10 PRIMARY HYPERTENSION: ICD-10-CM

## 2023-04-17 DIAGNOSIS — F41.9 ANXIETY: ICD-10-CM

## 2023-04-17 DIAGNOSIS — Z23 NEED FOR VACCINATION: ICD-10-CM

## 2023-04-17 DIAGNOSIS — Z13.220 LIPID SCREENING: ICD-10-CM

## 2023-04-17 PROCEDURE — 3075F SYST BP GE 130 - 139MM HG: CPT | Performed by: PHYSICIAN ASSISTANT

## 2023-04-17 PROCEDURE — 99215 OFFICE O/P EST HI 40 MIN: CPT | Performed by: PHYSICIAN ASSISTANT

## 2023-04-17 PROCEDURE — 90471 IMMUNIZATION ADMIN: CPT | Performed by: PHYSICIAN ASSISTANT

## 2023-04-17 PROCEDURE — 3078F DIAST BP <80 MM HG: CPT | Performed by: PHYSICIAN ASSISTANT

## 2023-04-17 PROCEDURE — 90677 PCV20 VACCINE IM: CPT | Performed by: PHYSICIAN ASSISTANT

## 2023-04-17 RX ORDER — LISINOPRIL 10 MG/1
10 TABLET ORAL DAILY
Qty: 30 TABLET | Refills: 1 | Status: SHIPPED | OUTPATIENT
Start: 2023-04-17 | End: 2023-10-14

## 2023-04-17 RX ORDER — ESCITALOPRAM OXALATE 10 MG/1
TABLET ORAL
Qty: 42 TABLET | Refills: 0 | Status: CANCELLED | OUTPATIENT
Start: 2023-04-17 | End: 2023-05-15

## 2023-04-17 RX ORDER — CITALOPRAM 10 MG/1
10 TABLET ORAL DAILY
Qty: 30 TABLET | Refills: 1 | Status: SHIPPED | OUTPATIENT
Start: 2023-04-17

## 2023-04-17 RX ORDER — ALBUTEROL SULFATE 90 UG/1
2 AEROSOL, METERED RESPIRATORY (INHALATION) 4 TIMES DAILY PRN
Qty: 18 G | Refills: 5 | Status: SHIPPED | OUTPATIENT
Start: 2023-04-17

## 2023-04-17 SDOH — ECONOMIC STABILITY: FOOD INSECURITY: WITHIN THE PAST 12 MONTHS, THE FOOD YOU BOUGHT JUST DIDN'T LAST AND YOU DIDN'T HAVE MONEY TO GET MORE.: PATIENT DECLINED

## 2023-04-17 SDOH — ECONOMIC STABILITY: FOOD INSECURITY: WITHIN THE PAST 12 MONTHS, YOU WORRIED THAT YOUR FOOD WOULD RUN OUT BEFORE YOU GOT MONEY TO BUY MORE.: SOMETIMES TRUE

## 2023-04-17 SDOH — ECONOMIC STABILITY: TRANSPORTATION INSECURITY
IN THE PAST 12 MONTHS, HAS LACK OF TRANSPORTATION KEPT YOU FROM MEETINGS, WORK, OR FROM GETTING THINGS NEEDED FOR DAILY LIVING?: NO

## 2023-04-17 SDOH — ECONOMIC STABILITY: HOUSING INSECURITY
IN THE LAST 12 MONTHS, WAS THERE A TIME WHEN YOU DID NOT HAVE A STEADY PLACE TO SLEEP OR SLEPT IN A SHELTER (INCLUDING NOW)?: PATIENT REFUSED

## 2023-04-17 SDOH — ECONOMIC STABILITY: INCOME INSECURITY: HOW HARD IS IT FOR YOU TO PAY FOR THE VERY BASICS LIKE FOOD, HOUSING, MEDICAL CARE, AND HEATING?: NOT VERY HARD

## 2023-04-17 ASSESSMENT — PATIENT HEALTH QUESTIONNAIRE - PHQ9
1. LITTLE INTEREST OR PLEASURE IN DOING THINGS: 0
SUM OF ALL RESPONSES TO PHQ QUESTIONS 1-9: 0
SUM OF ALL RESPONSES TO PHQ9 QUESTIONS 1 & 2: 0
SUM OF ALL RESPONSES TO PHQ QUESTIONS 1-9: 0
2. FEELING DOWN, DEPRESSED OR HOPELESS: 0

## 2023-04-17 NOTE — PATIENT INSTRUCTIONS
Please call and SCHEDULE a visit with your pulmonologist and cardiologist.    I recommend a goal AVERAGE blood pressure of 120 to 125 / < 80 mmHg. When you come in for follow up, please make sure to bring a [address] to your appointment for us to review. (Check your blood pressure once or twice daily and write down the numbers on a sheet of paper. Bring this with you to your appointment). Usually, we can calculate an average off of about 2 weeks worth of home blood pressure readings. If you do not already have a blood pressure monitor at home, I recommend getting a monitor with an ARM CUFF, not a wrist cuff to check your blood pressure at home. I recommend the brand Omron if you have the choice. Make sure that you TAKE YOUR BLOOD PRESSURE MEDICINE the day of your appointment. Let me know if you have any questions.

## 2023-04-17 NOTE — PROGRESS NOTES
2023    Yuri Santana    Chief Complaint   Patient presents with    Follow-up     Routine follow up visit. Last visit : 3/22/2021. HPI  History obtained from the patient. Tamir Norman is a 59 y.o. male who presents today for ROV. Patient has not been seen by provider at this office in 2 years. His last visit was in 2021. Patient has been off all his medications for about a year. SOB - Patient scheduled an appointment because he has been experiencing intolerable shortness of breath on exertion. \"I've noticed it's triggered by my anxiety or panic attacks. .. I have an obsession with time. \" If he's running late, this is very stressful for him, and he gets significantly more short of breath. Patient has a trip planned to [place] for a  of an old boss. He leaves this Wednesday morning and returns at 26. He states that he really wanted to get his breathing under control for this trip. The patient does admit a history of heart valve issues. He has a pacemaker. Anxiety - Patient would like to restart his anxiety medication. He did well with Lexapro. He liked it better than Zoloft. Patient does note some relational stress. \"That's the other thing that's bothering me. My marriage isn't going too well. \"     KRISTIN - Patient does have a CPAP and states \"I just started using it again. I try. I feel better when I'm on it. \"  Dipika Duckworth with pulmonology, Dr. Vipul Jin, but its been about a year since he has been seen. History of heart block and regurgitation - Patient follows with Dr. Lynn Bunn on [address] but has not been seen in quite a while. Para Eriksson just want to go in and replace the valve and I'm not going to do that anytime soon. \"     COPD - Patient Follows with pulmonology, Dr. Vipul Jin, but its been about a year since he has been seen. He was prescribed a Bevespi inhaler, but unfortunately, this was too expensive for him.     HTN- Patient used to be on lisinopril and metoprolol, but it

## 2023-04-18 ENCOUNTER — TELEPHONE (OUTPATIENT)
Dept: FAMILY MEDICINE CLINIC | Age: 58
End: 2023-04-18

## 2023-04-18 NOTE — TELEPHONE ENCOUNTER
To [facility]-    Patient said Dr. Jasmine Buys called prescriptions for Lasix and Metroprolol-----he wants to know if it is okay to take these?

## 2023-04-20 NOTE — TELEPHONE ENCOUNTER
Yes please ADD these medications to your regimen as instructed by Dr. Janis Morrison. Check your blood pressure at home daily and keep a log.

## 2023-05-25 DIAGNOSIS — I10 PRIMARY HYPERTENSION: ICD-10-CM

## 2023-05-25 DIAGNOSIS — J41.0 SIMPLE CHRONIC BRONCHITIS (HCC): ICD-10-CM

## 2023-05-25 DIAGNOSIS — F41.9 ANXIETY: ICD-10-CM

## 2023-05-25 RX ORDER — LISINOPRIL 10 MG/1
10 TABLET ORAL DAILY
Qty: 30 TABLET | Refills: 1 | Status: SHIPPED | OUTPATIENT
Start: 2023-05-25 | End: 2023-11-21

## 2023-05-25 RX ORDER — CITALOPRAM 10 MG/1
10 TABLET ORAL DAILY
Qty: 30 TABLET | Refills: 1 | Status: SHIPPED | OUTPATIENT
Start: 2023-05-25

## 2023-05-25 RX ORDER — ALBUTEROL SULFATE 90 UG/1
2 AEROSOL, METERED RESPIRATORY (INHALATION) 4 TIMES DAILY PRN
Qty: 18 G | Refills: 5 | Status: SHIPPED | OUTPATIENT
Start: 2023-05-25

## 2023-05-25 NOTE — TELEPHONE ENCOUNTER
----- Message from Tosha Edgar sent at 5/25/2023  1:31 PM EDT -----  Subject: Refill Request    QUESTIONS  Name of Medication? albuterol sulfate HFA (VENTOLIN HFA) 108 (90 Base)   MCG/ACT inhaler  Patient-reported dosage and instructions? as needed  How many days do you have left? 0  Preferred Pharmacy? [address]  Pharmacy phone number (if available)? [phone]  ---------------------------------------------------------------------------  --------------,  Name of Medication? lisinopril (PRINIVIL;ZESTRIL) 10 MG tablet  Patient-reported dosage and instructions? 1 tablet daily  How many days do you have left? 7  Preferred Pharmacy? [address]  Pharmacy phone number (if available)? [phone]  ---------------------------------------------------------------------------  --------------,  Name of Medication? citalopram (CELEXA) 10 MG tablet  Patient-reported dosage and instructions? 1 tablet daily  How many days do you have left? 7  Preferred Pharmacy? [address]  Pharmacy phone number (if available)? [phone]  ---------------------------------------------------------------------------  --------------  CALL BACK INFO  What is the best way for the office to contact you? OK to leave message on   voicemail  Preferred Call Back Phone Number? [phone]  ---------------------------------------------------------------------------  --------------  SCRIPT ANSWERS  Relationship to Patient?  Self

## 2023-06-19 ENCOUNTER — TELEMEDICINE (OUTPATIENT)
Dept: FAMILY MEDICINE CLINIC | Age: 58
End: 2023-06-19
Payer: COMMERCIAL

## 2023-06-19 DIAGNOSIS — F41.9 ANXIETY: Primary | ICD-10-CM

## 2023-06-19 DIAGNOSIS — G47.33 OSA (OBSTRUCTIVE SLEEP APNEA): ICD-10-CM

## 2023-06-19 DIAGNOSIS — J41.0 SIMPLE CHRONIC BRONCHITIS (HCC): ICD-10-CM

## 2023-06-19 DIAGNOSIS — Z86.79 H/O HEART BLOCK: ICD-10-CM

## 2023-06-19 DIAGNOSIS — I35.1 SEVERE AORTIC REGURGITATION: ICD-10-CM

## 2023-06-19 DIAGNOSIS — I10 PRIMARY HYPERTENSION: ICD-10-CM

## 2023-06-19 PROCEDURE — 99214 OFFICE O/P EST MOD 30 MIN: CPT | Performed by: PHYSICIAN ASSISTANT

## 2023-06-19 RX ORDER — CALCIUM CARBONATE 500(1250)
500 TABLET ORAL DAILY
COMMUNITY

## 2023-06-19 RX ORDER — CITALOPRAM 20 MG/1
20 TABLET ORAL DAILY
Qty: 30 TABLET | Refills: 1 | Status: SHIPPED | OUTPATIENT
Start: 2023-06-19 | End: 2023-08-18

## 2023-06-19 RX ORDER — ALBUTEROL SULFATE 90 UG/1
2 AEROSOL, METERED RESPIRATORY (INHALATION) 4 TIMES DAILY PRN
Qty: 18 G | Refills: 5 | Status: SHIPPED | OUTPATIENT
Start: 2023-06-19

## 2023-06-19 RX ORDER — LISINOPRIL 10 MG/1
10 TABLET ORAL DAILY
Qty: 90 TABLET | Refills: 1 | Status: SHIPPED | OUTPATIENT
Start: 2023-06-19 | End: 2023-12-16

## 2023-06-19 ASSESSMENT — ENCOUNTER SYMPTOMS
WHEEZING: 0
SHORTNESS OF BREATH: 1
COUGH: 0

## 2023-06-19 NOTE — PROGRESS NOTES
2023    TELEHEALTH EVALUATION -- Audio/Visual (During FZASS-86 public health emergency)    HPI:    Coleman Fry (:  1965) has requested an audio/video evaluation for the following concern(s):    Chief Complaint   Patient presents with    Medication Check     Medication f/up. Anxiety - Patient started Celexa 10 mg daily and has found this tremendously helpful. His anxiety and SOB have significantly improved, though he does still have symptoms on stressful days. He is interested in increasing his dose of Celexa. He uses the albuterol inhaler sporadically. History of Heart Block, Severe Aortic Regurgitation - Patient wonders if he should go back on Metoprolol. He stopped taking this about the time he stopped taking his other medications. He had been following with Dr. Amara Head but is currently not interested in following with any cardiologist.     KRISTIN - Patient started using his CPAP again. States he's been feeling a lot better. Blood work was ordered on 23, but the patient has not gotten this drawn yet. Review of Systems   Constitutional:  Negative for chills and fever. Respiratory:  Positive for shortness of breath. Negative for cough and wheezing. Cardiovascular:  Negative for chest pain and palpitations. Prior to Visit Medications    Medication Sig Taking?  Authorizing Provider   calcium carbonate (OSCAL) 500 MG TABS tablet Take 1 tablet by mouth daily Yes Historical Provider, MD   citalopram (CELEXA) 20 MG tablet Take 1 tablet by mouth daily Yes Max Miller, PA-C   lisinopril (PRINIVIL;ZESTRIL) 10 MG tablet Take 1 tablet by mouth daily Yes Max Miller, PA-C   albuterol sulfate HFA (VENTOLIN HFA) 108 (90 Base) MCG/ACT inhaler Inhale 2 puffs into the lungs 4 times daily as needed for Wheezing Yes Max Miller, CIRO   metoprolol tartrate (LOPRESSOR) 25 MG tablet Take 1 tablet by mouth 2 times daily Yes Max CIRO Bhatt       Social History     Tobacco Use

## 2023-07-19 ENCOUNTER — TELEPHONE (OUTPATIENT)
Dept: FAMILY MEDICINE CLINIC | Age: 58
End: 2023-07-19

## 2023-07-19 NOTE — TELEPHONE ENCOUNTER
Called patient to notify him FMLA has been faxed. If he would like a copy we can give that for him. No answer. Unable to leave VM, memory full message.

## 2023-08-24 DIAGNOSIS — F41.9 ANXIETY: ICD-10-CM

## 2023-08-28 RX ORDER — CITALOPRAM 20 MG/1
20 TABLET ORAL DAILY
Qty: 90 TABLET | Refills: 1 | Status: SHIPPED | OUTPATIENT
Start: 2023-08-28 | End: 2024-02-24

## 2023-08-28 NOTE — TELEPHONE ENCOUNTER
Or if he is meeting his goals already and just wants to stick with this dose, you can schedule him for a 6 month f/u in December

## 2023-11-28 ENCOUNTER — TELEPHONE (OUTPATIENT)
Dept: FAMILY MEDICINE CLINIC | Age: 58
End: 2023-11-28

## 2023-11-28 NOTE — TELEPHONE ENCOUNTER
Patient called and requested a copy of the FMLA forms being completed at this time. 11-28-23. Patient states that he brought the forms in a while back. Patient also stated that he spoke with Mario OSHEA late on 11-27-23.

## 2023-11-30 NOTE — TELEPHONE ENCOUNTER
I completed these forms Monday evening and placed them in the to be scanned and faxed pile. I don't see that it's been scanned into media yet, though.  Please make sure to call the patient once they are faxed to let him know they were sent and find out how he wants his copy sent (mail, fax,  at ?)

## 2024-03-01 DIAGNOSIS — I10 PRIMARY HYPERTENSION: ICD-10-CM

## 2024-03-04 RX ORDER — LISINOPRIL 10 MG/1
10 TABLET ORAL DAILY
Qty: 90 TABLET | Refills: 0 | OUTPATIENT
Start: 2024-03-04

## 2024-03-13 DIAGNOSIS — F41.9 ANXIETY: ICD-10-CM

## 2024-03-13 DIAGNOSIS — I10 PRIMARY HYPERTENSION: ICD-10-CM

## 2024-03-13 DIAGNOSIS — Z86.79 H/O HEART BLOCK: ICD-10-CM

## 2024-03-13 RX ORDER — CITALOPRAM 20 MG/1
20 TABLET ORAL DAILY
Qty: 90 TABLET | Refills: 0 | OUTPATIENT
Start: 2024-03-13

## 2024-03-13 RX ORDER — LISINOPRIL 10 MG/1
10 TABLET ORAL DAILY
Qty: 90 TABLET | Refills: 0 | OUTPATIENT
Start: 2024-03-13

## 2024-03-14 RX ORDER — CITALOPRAM 20 MG/1
20 TABLET ORAL DAILY
Qty: 30 TABLET | Refills: 0 | Status: SHIPPED | OUTPATIENT
Start: 2024-03-14 | End: 2024-04-13

## 2024-03-14 RX ORDER — LISINOPRIL 10 MG/1
10 TABLET ORAL DAILY
Qty: 30 TABLET | Refills: 0 | Status: SHIPPED | OUTPATIENT
Start: 2024-03-14 | End: 2024-04-13

## 2024-03-26 ASSESSMENT — PATIENT HEALTH QUESTIONNAIRE - PHQ9
SUM OF ALL RESPONSES TO PHQ QUESTIONS 1-9: 10
1. LITTLE INTEREST OR PLEASURE IN DOING THINGS: MORE THAN HALF THE DAYS
SUM OF ALL RESPONSES TO PHQ QUESTIONS 1-9: 10
10. IF YOU CHECKED OFF ANY PROBLEMS, HOW DIFFICULT HAVE THESE PROBLEMS MADE IT FOR YOU TO DO YOUR WORK, TAKE CARE OF THINGS AT HOME, OR GET ALONG WITH OTHER PEOPLE: NOT DIFFICULT AT ALL
SUM OF ALL RESPONSES TO PHQ QUESTIONS 1-9: 10
2. FEELING DOWN, DEPRESSED OR HOPELESS: SEVERAL DAYS
5. POOR APPETITE OR OVEREATING: MORE THAN HALF THE DAYS
SUM OF ALL RESPONSES TO PHQ9 QUESTIONS 1 & 2: 3
8. MOVING OR SPEAKING SO SLOWLY THAT OTHER PEOPLE COULD HAVE NOTICED. OR THE OPPOSITE, BEING SO FIGETY OR RESTLESS THAT YOU HAVE BEEN MOVING AROUND A LOT MORE THAN USUAL: MORE THAN HALF THE DAYS
5. POOR APPETITE OR OVEREATING: MORE THAN HALF THE DAYS
6. FEELING BAD ABOUT YOURSELF - OR THAT YOU ARE A FAILURE OR HAVE LET YOURSELF OR YOUR FAMILY DOWN: NOT AT ALL
SUM OF ALL RESPONSES TO PHQ QUESTIONS 1-9: 10
7. TROUBLE CONCENTRATING ON THINGS, SUCH AS READING THE NEWSPAPER OR WATCHING TELEVISION: NOT AT ALL
SUM OF ALL RESPONSES TO PHQ9 QUESTIONS 1 & 2: 3
3. TROUBLE FALLING OR STAYING ASLEEP: SEVERAL DAYS
9. THOUGHTS THAT YOU WOULD BE BETTER OFF DEAD, OR OF HURTING YOURSELF: NOT AT ALL
2. FEELING DOWN, DEPRESSED OR HOPELESS: SEVERAL DAYS
4. FEELING TIRED OR HAVING LITTLE ENERGY: MORE THAN HALF THE DAYS
9. THOUGHTS THAT YOU WOULD BE BETTER OFF DEAD, OR OF HURTING YOURSELF: NOT AT ALL
4. FEELING TIRED OR HAVING LITTLE ENERGY: MORE THAN HALF THE DAYS
1. LITTLE INTEREST OR PLEASURE IN DOING THINGS: MORE THAN HALF THE DAYS
SUM OF ALL RESPONSES TO PHQ QUESTIONS 1-9: 10
3. TROUBLE FALLING OR STAYING ASLEEP: SEVERAL DAYS
10. IF YOU CHECKED OFF ANY PROBLEMS, HOW DIFFICULT HAVE THESE PROBLEMS MADE IT FOR YOU TO DO YOUR WORK, TAKE CARE OF THINGS AT HOME, OR GET ALONG WITH OTHER PEOPLE: NOT DIFFICULT AT ALL
8. MOVING OR SPEAKING SO SLOWLY THAT OTHER PEOPLE COULD HAVE NOTICED. OR THE OPPOSITE - BEING SO FIDGETY OR RESTLESS THAT YOU HAVE BEEN MOVING AROUND A LOT MORE THAN USUAL: MORE THAN HALF THE DAYS
6. FEELING BAD ABOUT YOURSELF - OR THAT YOU ARE A FAILURE OR HAVE LET YOURSELF OR YOUR FAMILY DOWN: NOT AT ALL
7. TROUBLE CONCENTRATING ON THINGS, SUCH AS READING THE NEWSPAPER OR WATCHING TELEVISION: NOT AT ALL

## 2024-03-29 ENCOUNTER — OFFICE VISIT (OUTPATIENT)
Dept: FAMILY MEDICINE CLINIC | Age: 59
End: 2024-03-29
Payer: COMMERCIAL

## 2024-03-29 VITALS
BODY MASS INDEX: 30.69 KG/M2 | HEIGHT: 70 IN | WEIGHT: 214.4 LBS | HEART RATE: 93 BPM | OXYGEN SATURATION: 98 % | SYSTOLIC BLOOD PRESSURE: 138 MMHG | DIASTOLIC BLOOD PRESSURE: 74 MMHG

## 2024-03-29 DIAGNOSIS — J41.0 SIMPLE CHRONIC BRONCHITIS (HCC): ICD-10-CM

## 2024-03-29 DIAGNOSIS — F19.11 H/O: SUBSTANCE ABUSE (HCC): Primary | ICD-10-CM

## 2024-03-29 DIAGNOSIS — Z95.0 S/P PLACEMENT OF CARDIAC PACEMAKER: ICD-10-CM

## 2024-03-29 DIAGNOSIS — Z86.79 H/O HEART BLOCK: ICD-10-CM

## 2024-03-29 DIAGNOSIS — F41.1 GENERALIZED ANXIETY DISORDER: ICD-10-CM

## 2024-03-29 DIAGNOSIS — I10 PRIMARY HYPERTENSION: ICD-10-CM

## 2024-03-29 PROCEDURE — 3078F DIAST BP <80 MM HG: CPT | Performed by: STUDENT IN AN ORGANIZED HEALTH CARE EDUCATION/TRAINING PROGRAM

## 2024-03-29 PROCEDURE — 99214 OFFICE O/P EST MOD 30 MIN: CPT | Performed by: STUDENT IN AN ORGANIZED HEALTH CARE EDUCATION/TRAINING PROGRAM

## 2024-03-29 PROCEDURE — 3075F SYST BP GE 130 - 139MM HG: CPT | Performed by: STUDENT IN AN ORGANIZED HEALTH CARE EDUCATION/TRAINING PROGRAM

## 2024-03-29 RX ORDER — BUSPIRONE HYDROCHLORIDE 5 MG/1
5 TABLET ORAL 2 TIMES DAILY PRN
Qty: 60 TABLET | Refills: 0 | Status: SHIPPED | OUTPATIENT
Start: 2024-03-29 | End: 2024-04-28

## 2024-03-29 RX ORDER — UMECLIDINIUM BROMIDE AND VILANTEROL TRIFENATATE 62.5; 25 UG/1; UG/1
1 POWDER RESPIRATORY (INHALATION) DAILY
Qty: 30 EACH | Refills: 2 | Status: SHIPPED | OUTPATIENT
Start: 2024-03-29 | End: 2024-06-27

## 2024-03-29 RX ORDER — LISINOPRIL 10 MG/1
10 TABLET ORAL DAILY
Qty: 90 TABLET | Refills: 1 | Status: SHIPPED | OUTPATIENT
Start: 2024-03-29 | End: 2024-09-25

## 2024-03-29 NOTE — PROGRESS NOTES
normal.         Behavior: Behavior normal.         ASSESSMENT & PLAN    1. Primary hypertension    - lisinopril (PRINIVIL;ZESTRIL) 10 MG tablet; Take 1 tablet by mouth daily  Dispense: 90 tablet; Refill: 1  - metoprolol tartrate (LOPRESSOR) 25 MG tablet; Take 1 tablet by mouth 2 times daily  Dispense: 180 tablet; Refill: 1    2. H/O heart block    - metoprolol tartrate (LOPRESSOR) 25 MG tablet; Take 1 tablet by mouth 2 times daily  Dispense: 180 tablet; Refill: 1  - External Referral To Cardiology    3. H/O: substance abuse (HCC)      4. Simple chronic bronchitis (HCC)    - umeclidinium-vilanterol (ANORO ELLIPTA) 62.5-25 MCG/ACT inhaler; Inhale 1 puff into the lungs daily  Dispense: 30 each; Refill: 2    5. Generalized anxiety disorder    - busPIRone (BUSPAR) 5 MG tablet; Take 1 tablet by mouth 2 times daily as needed (anxiety)  Dispense: 60 tablet; Refill: 0    6. S/P placement of cardiac pacemaker  - External Referral To Cardiology    Start anoro  Hx. PPM currently not following with cardio we will refer [facility] per patient request     Return in about 6 weeks (around 5/10/2024).         Electronically signed by Pia De Paz DO on 3/29/2024

## 2024-04-12 ASSESSMENT — ENCOUNTER SYMPTOMS
SHORTNESS OF BREATH: 0
ABDOMINAL PAIN: 0
SORE THROAT: 0
WHEEZING: 0

## 2024-05-16 ENCOUNTER — COMMUNITY OUTREACH (OUTPATIENT)
Dept: FAMILY MEDICINE CLINIC | Age: 59
End: 2024-05-16

## 2024-07-04 DIAGNOSIS — F41.9 ANXIETY: ICD-10-CM

## 2024-07-04 DIAGNOSIS — F41.1 GENERALIZED ANXIETY DISORDER: ICD-10-CM

## 2024-07-05 RX ORDER — CITALOPRAM 20 MG/1
20 TABLET ORAL DAILY
Qty: 90 TABLET | Refills: 1 | Status: SHIPPED | OUTPATIENT
Start: 2024-07-05

## 2024-07-05 RX ORDER — BUSPIRONE HYDROCHLORIDE 5 MG/1
TABLET ORAL
Qty: 60 TABLET | Refills: 3 | Status: SHIPPED | OUTPATIENT
Start: 2024-07-05

## 2024-10-09 ENCOUNTER — OFFICE VISIT (OUTPATIENT)
Age: 59
End: 2024-10-09

## 2024-10-09 VITALS
DIASTOLIC BLOOD PRESSURE: 60 MMHG | OXYGEN SATURATION: 98 % | WEIGHT: 203 LBS | RESPIRATION RATE: 16 BRPM | HEART RATE: 69 BPM | SYSTOLIC BLOOD PRESSURE: 136 MMHG | BODY MASS INDEX: 32.62 KG/M2 | HEIGHT: 66 IN

## 2024-10-09 DIAGNOSIS — Z86.79 H/O HEART BLOCK: ICD-10-CM

## 2024-10-09 DIAGNOSIS — E66.9 OBESITY, UNSPECIFIED CLASS, UNSPECIFIED OBESITY TYPE, UNSPECIFIED WHETHER SERIOUS COMORBIDITY PRESENT: ICD-10-CM

## 2024-10-09 DIAGNOSIS — J41.0 SIMPLE CHRONIC BRONCHITIS (HCC): ICD-10-CM

## 2024-10-09 DIAGNOSIS — R73.9 HYPERGLYCEMIA: ICD-10-CM

## 2024-10-09 DIAGNOSIS — Z13.220 LIPID SCREENING: ICD-10-CM

## 2024-10-09 DIAGNOSIS — F41.9 ANXIETY: ICD-10-CM

## 2024-10-09 DIAGNOSIS — Z00.8 ENCOUNTER FOR BIOMETRIC SCREENING: Primary | ICD-10-CM

## 2024-10-09 DIAGNOSIS — I10 PRIMARY HYPERTENSION: ICD-10-CM

## 2024-10-09 PROCEDURE — 3075F SYST BP GE 130 - 139MM HG: CPT | Performed by: PHYSICIAN ASSISTANT

## 2024-10-09 PROCEDURE — 3078F DIAST BP <80 MM HG: CPT | Performed by: PHYSICIAN ASSISTANT

## 2024-10-09 PROCEDURE — MISCSPORTS SPORTS/WORK PHYSICAL: Performed by: PHYSICIAN ASSISTANT

## 2024-10-09 RX ORDER — CITALOPRAM HYDROBROMIDE 20 MG/1
20 TABLET ORAL DAILY
Qty: 90 TABLET | Refills: 1 | Status: SHIPPED | OUTPATIENT
Start: 2024-10-09

## 2024-10-09 RX ORDER — LISINOPRIL 10 MG/1
10 TABLET ORAL DAILY
Qty: 90 TABLET | Refills: 1 | Status: SHIPPED | OUTPATIENT
Start: 2024-10-09 | End: 2025-04-07

## 2024-10-09 RX ORDER — ALBUTEROL SULFATE 90 UG/1
2 INHALANT RESPIRATORY (INHALATION) 4 TIMES DAILY PRN
Qty: 18 G | Refills: 5 | Status: SHIPPED | OUTPATIENT
Start: 2024-10-09

## 2024-10-09 RX ORDER — METOPROLOL TARTRATE 25 MG/1
25 TABLET, FILM COATED ORAL 2 TIMES DAILY
Qty: 180 TABLET | Refills: 1 | Status: SHIPPED | OUTPATIENT
Start: 2024-10-09 | End: 2025-04-07

## 2024-10-09 ASSESSMENT — ENCOUNTER SYMPTOMS
CHEST TIGHTNESS: 0
EYE DISCHARGE: 0
EYE REDNESS: 0
VOMITING: 0
DIARRHEA: 0
RHINORRHEA: 0
WHEEZING: 0
BLOOD IN STOOL: 0
EYE PAIN: 0
SHORTNESS OF BREATH: 0
NAUSEA: 0
PHOTOPHOBIA: 0
ABDOMINAL PAIN: 0
BACK PAIN: 0
CONSTIPATION: 0
SORE THROAT: 0
COLOR CHANGE: 0
COUGH: 0

## 2024-10-09 NOTE — PROGRESS NOTES
appetite change, chills, fatigue and fever.   HENT:  Negative for congestion, ear pain, hearing loss, rhinorrhea and sore throat.    Eyes:  Negative for photophobia, pain, discharge and redness.   Respiratory:  Negative for cough, chest tightness, shortness of breath and wheezing.    Cardiovascular:  Negative for chest pain, palpitations and leg swelling.   Gastrointestinal:  Negative for abdominal pain, blood in stool, constipation, diarrhea, nausea and vomiting.   Endocrine: Negative for polyuria.   Genitourinary:  Negative for difficulty urinating, dysuria, flank pain, frequency, hematuria and urgency.   Musculoskeletal:  Negative for arthralgias, back pain, gait problem and joint swelling.   Skin:  Negative for color change and rash.   Neurological:  Negative for dizziness, syncope, weakness, light-headedness and headaches.   Hematological:  Negative for adenopathy.   Psychiatric/Behavioral:  Negative for agitation, behavioral problems and suicidal ideas. The patient is not nervous/anxious.        Physical Exam  Constitutional:       General: Nadir is not in acute distress.     Appearance: Nadir is obese. Nadir is not ill-appearing, toxic-appearing or diaphoretic.   HENT:      Head: Normocephalic and atraumatic.      Right Ear: External ear normal.      Left Ear: External ear normal.   Cardiovascular:      Rate and Rhythm: Regular rhythm.      Pulses: Normal pulses.   Pulmonary:      Effort: Pulmonary effort is normal. No respiratory distress.      Breath sounds: Normal breath sounds.   Abdominal:      General: Abdomen is flat. Bowel sounds are normal. There is no distension.      Palpations: Abdomen is soft. There is no mass.      Tenderness: There is no abdominal tenderness.      Hernia: No hernia is present.      Comments: Waist circumference 43 inches   Musculoskeletal:         General: Normal range of motion.   Skin:     General: Skin is warm and dry.   Neurological:      General: No focal deficit present.

## 2024-10-10 ENCOUNTER — HOSPITAL ENCOUNTER (OUTPATIENT)
Age: 59
Discharge: HOME | End: 2024-10-10
Payer: COMMERCIAL

## 2024-10-10 DIAGNOSIS — R73.9 HYPERGLYCEMIA: ICD-10-CM

## 2024-10-10 DIAGNOSIS — Z13.220 LIPID SCREENING: ICD-10-CM

## 2024-10-10 DIAGNOSIS — E66.9 OBESITY, UNSPECIFIED CLASS, UNSPECIFIED OBESITY TYPE, UNSPECIFIED WHETHER SERIOUS COMORBIDITY PRESENT: ICD-10-CM

## 2024-10-10 DIAGNOSIS — Z00.8 ENCOUNTER FOR BIOMETRIC SCREENING: ICD-10-CM

## 2024-10-10 LAB
ANION GAP SERPL CALCULATED.3IONS-SCNC: 13 MMOL/L (ref 4–16)
BUN SERPL-MCNC: 27 MG/DL (ref 6–23)
CALCIUM SERPL-MCNC: 9 MG/DL (ref 8.3–10.6)
CHLORIDE SERPL-SCNC: 101 MMOL/L (ref 99–110)
CHOLEST SERPL-MCNC: 191 MG/DL (ref 125–199)
CO2 SERPL-SCNC: 21 MMOL/L (ref 21–32)
CREAT SERPL-MCNC: 1 MG/DL (ref 0.9–1.3)
EST. AVERAGE GLUCOSE BLD GHB EST-MCNC: 111 MG/DL
GFR, ESTIMATED: 86 ML/MIN/1.73M2
GLUCOSE SERPL-MCNC: 96 MG/DL (ref 70–99)
HBA1C MFR BLD: 5.5 % (ref 4.2–6.3)
HDLC SERPL-MCNC: 67 MG/DL
LDLC SERPL CALC-MCNC: 76 MG/DL
POTASSIUM SERPL-SCNC: 4.6 MMOL/L (ref 3.5–5.1)
SODIUM SERPL-SCNC: 135 MMOL/L (ref 135–145)
TRIGL SERPL-MCNC: 242 MG/DL

## 2024-10-10 PROCEDURE — 80048 BASIC METABOLIC PNL TOTAL CA: CPT

## 2024-10-10 PROCEDURE — 36415 COLL VENOUS BLD VENIPUNCTURE: CPT

## 2024-10-10 PROCEDURE — 80061 LIPID PANEL: CPT

## 2024-10-10 PROCEDURE — 83036 HEMOGLOBIN GLYCOSYLATED A1C: CPT

## 2024-10-11 NOTE — RESULT ENCOUNTER NOTE
Pricilla:    Hello Mr. Apple    Your lab results are reassuring, cholesterol values are normal, triglycerides are elevated so try to cut back on processed foods and excessive sugars and carbohydrates.  Your blood sugar is normal, A1c is slightly up from previous however still below prediabetic range.  I completed your paperwork and we will fax that to the number listed.    Please let me know if you have any other questions or concerns.  Thanks,  Santi

## 2024-11-08 DIAGNOSIS — F41.1 GENERALIZED ANXIETY DISORDER: ICD-10-CM

## 2024-11-08 RX ORDER — BUSPIRONE HYDROCHLORIDE 5 MG/1
TABLET ORAL
Qty: 60 TABLET | Refills: 0 | OUTPATIENT
Start: 2024-11-08

## 2024-11-25 DIAGNOSIS — F41.1 GENERALIZED ANXIETY DISORDER: ICD-10-CM

## 2024-11-25 RX ORDER — BUSPIRONE HYDROCHLORIDE 5 MG/1
TABLET ORAL
Qty: 60 TABLET | Refills: 0 | OUTPATIENT
Start: 2024-11-25

## 2025-06-02 DIAGNOSIS — I10 PRIMARY HYPERTENSION: ICD-10-CM

## 2025-06-03 RX ORDER — LISINOPRIL 10 MG/1
10 TABLET ORAL DAILY
Qty: 30 TABLET | Refills: 0 | Status: SHIPPED | OUTPATIENT
Start: 2025-06-03

## 2025-06-03 NOTE — TELEPHONE ENCOUNTER
Medication:   Requested Prescriptions     Pending Prescriptions Disp Refills    lisinopril (PRINIVIL;ZESTRIL) 10 MG tablet [Pharmacy Med Name: Lisinopril Oral Tablet 10 MG] 90 tablet 0     Sig: TAKE 1 TABLET BY MOUTH EVERY DAY       Last Filled:      Patient Phone Number: [phone] (home)     Last appt: 10/9/2024   Next appt: Visit date not found    Last Labs DM:   Lab Results   Component Value Date/Time    LABA1C 5.5 10/10/2024 12:25 PM     Last Lipid:   Lab Results   Component Value Date/Time    CHOL 191 10/10/2024 12:25 PM    TRIG 242 10/10/2024 12:25 PM    HDL 67 10/10/2024 12:25 PM     Last PSA: No results found for: \"PSA\"  Last Thyroid:   Lab Results   Component Value Date/Time    TSH 1.80 09/12/2019 10:58 AM

## 2025-07-27 DIAGNOSIS — I10 PRIMARY HYPERTENSION: ICD-10-CM

## 2025-07-28 RX ORDER — LISINOPRIL 10 MG/1
10 TABLET ORAL DAILY
Qty: 30 TABLET | Refills: 0 | OUTPATIENT
Start: 2025-07-28

## 2025-07-31 DIAGNOSIS — I10 PRIMARY HYPERTENSION: ICD-10-CM

## 2025-07-31 RX ORDER — LISINOPRIL 10 MG/1
10 TABLET ORAL DAILY
Qty: 30 TABLET | Refills: 0 | OUTPATIENT
Start: 2025-07-31

## 2025-07-31 RX ORDER — LISINOPRIL 10 MG/1
10 TABLET ORAL DAILY
Qty: 90 TABLET | Refills: 0 | OUTPATIENT
Start: 2025-07-31

## 2025-08-04 DIAGNOSIS — I10 PRIMARY HYPERTENSION: ICD-10-CM

## 2025-08-05 RX ORDER — LISINOPRIL 10 MG/1
10 TABLET ORAL DAILY
Qty: 90 TABLET | Refills: 0 | OUTPATIENT
Start: 2025-08-05

## 2025-08-07 DIAGNOSIS — I10 PRIMARY HYPERTENSION: ICD-10-CM

## 2025-08-08 RX ORDER — LISINOPRIL 10 MG/1
10 TABLET ORAL DAILY
Qty: 90 TABLET | Refills: 0 | OUTPATIENT
Start: 2025-08-08

## 2025-08-16 DIAGNOSIS — I10 PRIMARY HYPERTENSION: ICD-10-CM

## 2025-08-18 RX ORDER — LISINOPRIL 10 MG/1
10 TABLET ORAL DAILY
Qty: 30 TABLET | Refills: 0 | OUTPATIENT
Start: 2025-08-18

## 2025-08-26 DIAGNOSIS — I10 PRIMARY HYPERTENSION: ICD-10-CM

## 2025-08-27 RX ORDER — LISINOPRIL 10 MG/1
10 TABLET ORAL DAILY
Qty: 90 TABLET | Refills: 0 | OUTPATIENT
Start: 2025-08-27

## 2025-09-03 DIAGNOSIS — I10 PRIMARY HYPERTENSION: ICD-10-CM

## 2025-09-03 DIAGNOSIS — F41.9 ANXIETY: ICD-10-CM

## 2025-09-03 DIAGNOSIS — F41.1 GENERALIZED ANXIETY DISORDER: ICD-10-CM

## 2025-09-03 RX ORDER — LISINOPRIL 10 MG/1
10 TABLET ORAL DAILY
Qty: 30 TABLET | Refills: 0 | OUTPATIENT
Start: 2025-09-03

## 2025-09-03 RX ORDER — CITALOPRAM HYDROBROMIDE 20 MG/1
20 TABLET ORAL DAILY
Qty: 90 TABLET | Refills: 1 | OUTPATIENT
Start: 2025-09-03

## 2025-09-04 DIAGNOSIS — F41.9 ANXIETY: ICD-10-CM

## 2025-09-04 DIAGNOSIS — I10 PRIMARY HYPERTENSION: ICD-10-CM

## 2025-09-04 RX ORDER — BUSPIRONE HYDROCHLORIDE 5 MG/1
TABLET ORAL
Qty: 180 TABLET | Refills: 1 | Status: SHIPPED | OUTPATIENT
Start: 2025-09-04 | End: 2025-09-05 | Stop reason: SDUPTHER

## 2025-09-05 ENCOUNTER — OFFICE VISIT (OUTPATIENT)
Age: 60
End: 2025-09-05

## 2025-09-05 VITALS
HEART RATE: 70 BPM | DIASTOLIC BLOOD PRESSURE: 64 MMHG | OXYGEN SATURATION: 99 % | SYSTOLIC BLOOD PRESSURE: 142 MMHG | WEIGHT: 217.6 LBS | HEIGHT: 67 IN | RESPIRATION RATE: 20 BRPM | BODY MASS INDEX: 34.15 KG/M2

## 2025-09-05 DIAGNOSIS — Z00.00 ANNUAL PHYSICAL EXAM: Primary | ICD-10-CM

## 2025-09-05 DIAGNOSIS — E66.9 OBESITY, UNSPECIFIED CLASS, UNSPECIFIED OBESITY TYPE, UNSPECIFIED WHETHER SERIOUS COMORBIDITY PRESENT: ICD-10-CM

## 2025-09-05 DIAGNOSIS — I10 PRIMARY HYPERTENSION: ICD-10-CM

## 2025-09-05 DIAGNOSIS — Z13.220 LIPID SCREENING: ICD-10-CM

## 2025-09-05 DIAGNOSIS — F41.1 GENERALIZED ANXIETY DISORDER: ICD-10-CM

## 2025-09-05 DIAGNOSIS — R73.9 HYPERGLYCEMIA: ICD-10-CM

## 2025-09-05 DIAGNOSIS — Z86.79 H/O HEART BLOCK: ICD-10-CM

## 2025-09-05 DIAGNOSIS — J41.0 SIMPLE CHRONIC BRONCHITIS (HCC): ICD-10-CM

## 2025-09-05 DIAGNOSIS — F41.9 ANXIETY: ICD-10-CM

## 2025-09-05 RX ORDER — LISINOPRIL 10 MG/1
10 TABLET ORAL DAILY
Qty: 90 TABLET | Refills: 0 | OUTPATIENT
Start: 2025-09-05

## 2025-09-05 RX ORDER — CITALOPRAM HYDROBROMIDE 20 MG/1
20 TABLET ORAL DAILY
Qty: 30 TABLET | Refills: 1 | Status: SHIPPED | OUTPATIENT
Start: 2025-09-05

## 2025-09-05 RX ORDER — UMECLIDINIUM BROMIDE AND VILANTEROL TRIFENATATE 62.5; 25 UG/1; UG/1
1 POWDER RESPIRATORY (INHALATION) DAILY
Qty: 30 EACH | Refills: 2 | Status: SHIPPED | OUTPATIENT
Start: 2025-09-05 | End: 2025-12-04

## 2025-09-05 RX ORDER — METOPROLOL TARTRATE 25 MG/1
25 TABLET, FILM COATED ORAL 2 TIMES DAILY
Qty: 180 TABLET | Refills: 1 | Status: SHIPPED | OUTPATIENT
Start: 2025-09-05 | End: 2026-03-04

## 2025-09-05 RX ORDER — CITALOPRAM HYDROBROMIDE 20 MG/1
20 TABLET ORAL DAILY
Qty: 90 TABLET | Refills: 0 | OUTPATIENT
Start: 2025-09-05

## 2025-09-05 RX ORDER — ALBUTEROL SULFATE 90 UG/1
2 INHALANT RESPIRATORY (INHALATION) 4 TIMES DAILY PRN
Qty: 18 G | Refills: 5 | Status: SHIPPED | OUTPATIENT
Start: 2025-09-05

## 2025-09-05 RX ORDER — BUSPIRONE HYDROCHLORIDE 5 MG/1
TABLET ORAL
Qty: 60 TABLET | Refills: 1 | Status: SHIPPED | OUTPATIENT
Start: 2025-09-05

## 2025-09-05 RX ORDER — LISINOPRIL 10 MG/1
10 TABLET ORAL DAILY
Qty: 90 TABLET | Refills: 2 | Status: SHIPPED | OUTPATIENT
Start: 2025-09-05

## 2025-09-05 SDOH — ECONOMIC STABILITY: FOOD INSECURITY: WITHIN THE PAST 12 MONTHS, YOU WORRIED THAT YOUR FOOD WOULD RUN OUT BEFORE YOU GOT MONEY TO BUY MORE.: NEVER TRUE

## 2025-09-05 SDOH — ECONOMIC STABILITY: FOOD INSECURITY: WITHIN THE PAST 12 MONTHS, THE FOOD YOU BOUGHT JUST DIDN'T LAST AND YOU DIDN'T HAVE MONEY TO GET MORE.: NEVER TRUE

## 2025-09-05 ASSESSMENT — ENCOUNTER SYMPTOMS
RHINORRHEA: 0
COUGH: 0
EYE REDNESS: 0
COLOR CHANGE: 0
SORE THROAT: 0
SHORTNESS OF BREATH: 0
WHEEZING: 0
EYE PAIN: 0
EYE DISCHARGE: 0
PHOTOPHOBIA: 0
DIARRHEA: 0
ABDOMINAL PAIN: 0
VOMITING: 0
BLOOD IN STOOL: 0
BACK PAIN: 0
CONSTIPATION: 0
CHEST TIGHTNESS: 0
NAUSEA: 0

## 2025-09-05 ASSESSMENT — PATIENT HEALTH QUESTIONNAIRE - PHQ9
3. TROUBLE FALLING OR STAYING ASLEEP: NOT AT ALL
8. MOVING OR SPEAKING SO SLOWLY THAT OTHER PEOPLE COULD HAVE NOTICED. OR THE OPPOSITE, BEING SO FIGETY OR RESTLESS THAT YOU HAVE BEEN MOVING AROUND A LOT MORE THAN USUAL: NOT AT ALL
SUM OF ALL RESPONSES TO PHQ QUESTIONS 1-9: 11
6. FEELING BAD ABOUT YOURSELF - OR THAT YOU ARE A FAILURE OR HAVE LET YOURSELF OR YOUR FAMILY DOWN: NOT AT ALL
4. FEELING TIRED OR HAVING LITTLE ENERGY: NEARLY EVERY DAY
2. FEELING DOWN, DEPRESSED OR HOPELESS: SEVERAL DAYS
7. TROUBLE CONCENTRATING ON THINGS, SUCH AS READING THE NEWSPAPER OR WATCHING TELEVISION: MORE THAN HALF THE DAYS
5. POOR APPETITE OR OVEREATING: NEARLY EVERY DAY
1. LITTLE INTEREST OR PLEASURE IN DOING THINGS: MORE THAN HALF THE DAYS
9. THOUGHTS THAT YOU WOULD BE BETTER OFF DEAD, OR OF HURTING YOURSELF: NOT AT ALL
SUM OF ALL RESPONSES TO PHQ QUESTIONS 1-9: 11
10. IF YOU CHECKED OFF ANY PROBLEMS, HOW DIFFICULT HAVE THESE PROBLEMS MADE IT FOR YOU TO DO YOUR WORK, TAKE CARE OF THINGS AT HOME, OR GET ALONG WITH OTHER PEOPLE: VERY DIFFICULT
SUM OF ALL RESPONSES TO PHQ QUESTIONS 1-9: 11
SUM OF ALL RESPONSES TO PHQ QUESTIONS 1-9: 11

## (undated) DEVICE — AMD ANTIMICROBIAL NON-ADHERENT PAD,0.2% POLYHEXAMETHYLENE BIGUANIDE HCI (PHMB): Brand: TELFA

## (undated) DEVICE — Z DUP USE 2257490 ADHESIVE SKIN CLSRE 036ML TPCL 2CTL CNCRLTE HIGH VSCSTY DRMB

## (undated) DEVICE — SLING ARM M L1375IN D75IN WHT POLY MESH ENVELOP MTL SIDE

## (undated) DEVICE — GLOVE SURG SZ 65 CRM LTX FREE POLYISOPRENE POLYMER BEAD ANTI

## (undated) DEVICE — DRESSING TRNSPAR W5XL4.5IN FLM SHT SEMIPERMEABLE WIND

## (undated) DEVICE — SOLUTION IV IRRIG WATER 1000ML POUR BRL 2F7114

## (undated) DEVICE — PENCIL ES CRD L10FT HND SWCHING ROCK SWCH W/ EDGE COAT BLDE

## (undated) DEVICE — ELECTRODE ES AD CRDLSS PT RET REM POLYHESIVE

## (undated) DEVICE — INTRODUCER SHTH 7FR L13CM NDL 18GA GWIRE 0.035IN SYR VLV

## (undated) DEVICE — DRAPE SHEET ULTRAGARD: Brand: MEDLINE

## (undated) DEVICE — TUBING, SUCTION, 9/32" X 10', STRAIGHT: Brand: MEDLINE

## (undated) DEVICE — YANKAUER,FLEXIBLE HANDLE,REGLR CAPACITY: Brand: MEDLINE INDUSTRIES, INC.

## (undated) DEVICE — CABLE PACE LNG L12IN CHN A OR V SM CLP EPG TEMP DISP

## (undated) DEVICE — DECANTER FLD 9IN ST BG FOR ASEP TRNSF OF FLD

## (undated) DEVICE — DRAPE,UTILITY,XL,4/PK,STERILE: Brand: MEDLINE

## (undated) DEVICE — COVER,C-ARM,41X74: Brand: MEDLINE

## (undated) DEVICE — SUTURE VCRL SZ 4-0 L27IN ABSRB VLT L26MM SH 1/2 CIR J315H

## (undated) DEVICE — INTENDED FOR TISSUE SEPARATION, AND OTHER PROCEDURES THAT REQUIRE A SHARP SURGICAL BLADE TO PUNCTURE OR CUT.: Brand: BARD-PARKER ® STAINLESS STEEL BLADES

## (undated) DEVICE — SYRINGE MED 10ML TRNSLUC BRL PLUNG BLK MRK POLYPR CTRL

## (undated) DEVICE — TOWEL,OR,DSP,ST,BLUE,STD,6/PK,12PK/CS: Brand: MEDLINE

## (undated) DEVICE — ELECTRODE ES L2.75IN S STL INSUL BLDE W/ SL EDGE

## (undated) DEVICE — LINER,SEMI-RIGID,3000CC,50EA/CS: Brand: MEDLINE

## (undated) DEVICE — COUNTER NDL 30 COUNT FOAM STRP SGL MAG

## (undated) DEVICE — SUTURE PERMAHAND SZ 2-0 L18IN NONABSORBABLE BLK L26MM SH C012D

## (undated) DEVICE — PACK,BASIC,IX: Brand: MEDLINE

## (undated) DEVICE — CONTAINER,SPECIMEN,OR STERILE,4OZ: Brand: MEDLINE

## (undated) DEVICE — CHLORAPREP 26ML ORANGE

## (undated) DEVICE — GAUZE,SPONGE,4"X4",16PLY,XRAY,STRL,LF: Brand: MEDLINE

## (undated) DEVICE — NEEDLE HYPO 23GA L1.5IN TURQ POLYPR HUB S STL THN WALL IM

## (undated) DEVICE — BLADE CLIPPER GEN PURP NS

## (undated) DEVICE — MARKER SURG SKIN UTIL REGULAR/FINE 2 TIP W/ RUL AND 9 LBL

## (undated) DEVICE — 3M™ IOBAN™ 2 ANTIMICROBIAL INCISE DRAPE 6650EZ: Brand: IOBAN™ 2